# Patient Record
Sex: MALE | Race: WHITE | NOT HISPANIC OR LATINO | Employment: FULL TIME | URBAN - METROPOLITAN AREA
[De-identification: names, ages, dates, MRNs, and addresses within clinical notes are randomized per-mention and may not be internally consistent; named-entity substitution may affect disease eponyms.]

---

## 2017-01-03 ENCOUNTER — MEDICAL CORRESPONDENCE (OUTPATIENT)
Dept: HEALTH INFORMATION MANAGEMENT | Facility: CLINIC | Age: 27
End: 2017-01-03

## 2017-01-10 ENCOUNTER — MYC REFILL (OUTPATIENT)
Dept: FAMILY MEDICINE | Facility: CLINIC | Age: 27
End: 2017-01-10

## 2017-01-10 DIAGNOSIS — G44.219 EPISODIC TENSION-TYPE HEADACHE, NOT INTRACTABLE: ICD-10-CM

## 2017-01-10 DIAGNOSIS — F98.8 ADD (ATTENTION DEFICIT DISORDER): ICD-10-CM

## 2017-01-10 RX ORDER — BUTALBITAL, ACETAMINOPHEN AND CAFFEINE 50; 325; 40 MG/1; MG/1; MG/1
1 TABLET ORAL EVERY 6 HOURS PRN
Qty: 25 TABLET | Refills: 0 | Status: SHIPPED | OUTPATIENT
Start: 2017-01-10 | End: 2017-03-15

## 2017-01-10 RX ORDER — DEXTROAMPHETAMINE SACCHARATE, AMPHETAMINE ASPARTATE MONOHYDRATE, DEXTROAMPHETAMINE SULFATE AND AMPHETAMINE SULFATE 7.5; 7.5; 7.5; 7.5 MG/1; MG/1; MG/1; MG/1
CAPSULE, EXTENDED RELEASE ORAL
Qty: 60 CAPSULE | Refills: 0 | Status: CANCELLED | OUTPATIENT
Start: 2017-01-10

## 2017-01-10 NOTE — TELEPHONE ENCOUNTER
Message from [employer]:  Original authorizing provider: MD Tejas Baldwin III would like a refill of the following medications:  amphetamine-dextroamphetamine (ADDERALL XR) 30 MG per capsule [Karan Waters MD]  butalbital-acetaminophen-caffeine (FIORICET/ESGIC) -40 MG per tablet [Karan Waters MD]    Preferred pharmacy: [id number] - [place], [place] - [address]    Comment:

## 2017-01-10 NOTE — TELEPHONE ENCOUNTER
Controlled Substance Refill Request for Fioricet, Adderall  Problem List Complete:  No     PROVIDER TO CONSIDER COMPLETION OF PROBLEM LIST AND OVERVIEW/CONTROLLED SUBSTANCE AGREEMENT    Last Written Prescription Date:  12/23/16, Adderall (12/24/16  #60)  Last Fill Quantity: 25,   # refills: 0    Last Office Visit with [facility] primary care provider: 10/20/16    Future Office visit: none     checked in past 6 months?  Yes 11/30/16    Tangela Del Rio RN

## 2017-01-11 ENCOUNTER — TELEPHONE (OUTPATIENT)
Dept: FAMILY MEDICINE | Facility: CLINIC | Age: 27
End: 2017-01-11

## 2017-01-12 ENCOUNTER — MYC MEDICAL ADVICE (OUTPATIENT)
Dept: FAMILY MEDICINE | Facility: CLINIC | Age: 27
End: 2017-01-12

## 2017-01-12 DIAGNOSIS — F98.8 ADD (ATTENTION DEFICIT DISORDER): Primary | ICD-10-CM

## 2017-01-12 RX ORDER — DEXTROAMPHETAMINE SACCHARATE, AMPHETAMINE ASPARTATE MONOHYDRATE, DEXTROAMPHETAMINE SULFATE AND AMPHETAMINE SULFATE 7.5; 7.5; 7.5; 7.5 MG/1; MG/1; MG/1; MG/1
CAPSULE, EXTENDED RELEASE ORAL
Qty: 60 CAPSULE | Refills: 0 | Status: SHIPPED | OUTPATIENT
Start: 2017-01-12 | End: 2017-03-15

## 2017-01-13 RX ORDER — DEXTROAMPHETAMINE SACCHARATE, AMPHETAMINE ASPARTATE, DEXTROAMPHETAMINE SULFATE AND AMPHETAMINE SULFATE 7.5; 7.5; 7.5; 7.5 MG/1; MG/1; MG/1; MG/1
30 TABLET ORAL 2 TIMES DAILY
Qty: 60 TABLET | Refills: 0 | Status: SHIPPED | OUTPATIENT
Start: 2017-01-21 | End: 2017-02-23

## 2017-01-13 NOTE — TELEPHONE ENCOUNTER
Ok refill but NOT due until 1/22/17 since paying out of pocket anyway. Can pick-up whenever. [place].

## 2017-03-15 ENCOUNTER — MYC REFILL (OUTPATIENT)
Dept: FAMILY MEDICINE | Facility: CLINIC | Age: 27
End: 2017-03-15

## 2017-03-15 DIAGNOSIS — M54.9 MID BACK PAIN: ICD-10-CM

## 2017-03-15 DIAGNOSIS — G89.29 CHRONIC BILATERAL LOW BACK PAIN WITH RIGHT-SIDED SCIATICA: ICD-10-CM

## 2017-03-15 DIAGNOSIS — G44.219 EPISODIC TENSION-TYPE HEADACHE, NOT INTRACTABLE: ICD-10-CM

## 2017-03-15 DIAGNOSIS — M54.41 CHRONIC BILATERAL LOW BACK PAIN WITH RIGHT-SIDED SCIATICA: ICD-10-CM

## 2017-03-15 DIAGNOSIS — F32.2 MAJOR DEPRESSIVE DISORDER, SINGLE EPISODE, SEVERE WITHOUT PSYCHOTIC FEATURES (H): ICD-10-CM

## 2017-03-15 DIAGNOSIS — F98.8 ADD (ATTENTION DEFICIT DISORDER): ICD-10-CM

## 2017-03-15 RX ORDER — NABUMETONE 500 MG/1
1000 TABLET, FILM COATED ORAL 2 TIMES DAILY PRN
Qty: 50 TABLET | Refills: 0 | Status: CANCELLED | OUTPATIENT
Start: 2017-03-15

## 2017-03-15 NOTE — TELEPHONE ENCOUNTER
Message from Marilu:  Original authorizing provider: MD Ousmane Baldwin III would like a refill of the following medications:  nabumetone (RELAFEN) 500 MG tablet [Karan Waters MD]  sertraline (ZOLOFT) 100 MG tablet [Karan Waters MD]  gabapentin (NEURONTIN) 300 MG capsule [Karan Waters MD]  butalbital-acetaminophen-caffeine (FIORICET/ESGIC) -40 MG per tablet [Karan Waters MD]  amphetamine-dextroamphetamine (ADDERALL XR) 30 MG per 24 hr capsule [Karan Waters MD]    Preferred pharmacy: [id number] - [place], [place] - [address] [address]    Comment:  I just sent all once so i can do one  . I have a new insurance now and they will cover my extended 30mg adderall and the extended release oxycodone . Now that i have some good changes in life i would like to try it as it will be affordable even if its easier to do a trial dose I sent all medicaTion at once to make one trip this month, I need to save money to help cover costs that go along with my sons upcoming surgery in april . Thanks again for everything & enjoy this beautiful weather!!

## 2017-03-16 RX ORDER — SERTRALINE HYDROCHLORIDE 100 MG/1
TABLET, FILM COATED ORAL
Qty: 135 TABLET | Refills: 0 | Status: SHIPPED | OUTPATIENT
Start: 2017-03-16 | End: 2017-06-02

## 2017-03-16 RX ORDER — DEXTROAMPHETAMINE SACCHARATE, AMPHETAMINE ASPARTATE MONOHYDRATE, DEXTROAMPHETAMINE SULFATE AND AMPHETAMINE SULFATE 7.5; 7.5; 7.5; 7.5 MG/1; MG/1; MG/1; MG/1
CAPSULE, EXTENDED RELEASE ORAL
Qty: 60 CAPSULE | Refills: 0 | Status: SHIPPED | OUTPATIENT
Start: 2017-03-24 | End: 2017-03-16

## 2017-03-16 RX ORDER — GABAPENTIN 300 MG/1
300 CAPSULE ORAL
Qty: 30 CAPSULE | Refills: 1 | Status: SHIPPED | OUTPATIENT
Start: 2017-03-16 | End: 2017-04-14

## 2017-03-16 RX ORDER — BUTALBITAL, ACETAMINOPHEN AND CAFFEINE 50; 325; 40 MG/1; MG/1; MG/1
1 TABLET ORAL EVERY 6 HOURS PRN
Qty: 25 TABLET | Refills: 0 | Status: SHIPPED | OUTPATIENT
Start: 2017-03-16 | End: 2017-04-14

## 2017-03-16 NOTE — TELEPHONE ENCOUNTER
Ok refill but adderall not due until next week. Printed. relafen can interact with zoloft. Now with insurance let's get a follow-up md appointment to redue meds/etc.

## 2017-03-17 NOTE — TELEPHONE ENCOUNTER
Patient needs to wait prescription oxycontin because has 2-3 weeks left of oxycodone. Karan Waters MD

## 2017-03-28 ENCOUNTER — TELEPHONE (OUTPATIENT)
Dept: FAMILY MEDICINE | Facility: CLINIC | Age: 27
End: 2017-03-28

## 2017-03-28 NOTE — TELEPHONE ENCOUNTER
[facility] yRan Burch, calling to get medical information on this patient for the prior auth.  Please call them back at [phone].

## 2017-03-28 NOTE — TELEPHONE ENCOUNTER
Oxycontin 10mg - prior auth is required  [facility]  [phone]  ID [id number]    Also, qty written for #30  (twice daily sig) was it intended for 15 days only??  We would need a new rx if this should be corrected    Lanette F/Tech  [facility]

## 2017-03-30 NOTE — TELEPHONE ENCOUNTER
PA denied for Oxycontin ER    Formulary alternatives have not been tried. You must try similar drugs that are on your health plan formulary before we can approve drug coverage.  Patient needs to have tried and failed fetanyl transdermal and methadone.    Deirdre Munoz MA/TC

## 2017-03-31 ENCOUNTER — TELEPHONE (OUTPATIENT)
Dept: FAMILY MEDICINE | Facility: CLINIC | Age: 27
End: 2017-03-31

## 2017-03-31 DIAGNOSIS — M54.9 MID BACK PAIN: ICD-10-CM

## 2017-03-31 NOTE — TELEPHONE ENCOUNTER
Pt call Ins and denied piror auth., they are saying he should try methadone or fentenale patches.  Ins is dropping after today. Pt would like to to back to Oxycodon. Pt has appointment on 4/9/17 but is out of pain med now.  Pt left the Rx for Oxycotton at the [facility].    Thank You

## 2017-04-03 RX ORDER — OXYCODONE HYDROCHLORIDE 5 MG/1
5 TABLET ORAL 2 TIMES DAILY PRN
Qty: 60 TABLET | Refills: 0 | Status: SHIPPED | OUTPATIENT
Start: 2017-04-04 | End: 2017-05-11

## 2017-04-03 NOTE — TELEPHONE ENCOUNTER
I don't do fentynal patches or methadone. Can see pain specialist or another provider. All I'm comfortable is oxycondone as before. Prescription done for pick-up tomorrow if desires.

## 2017-04-03 NOTE — TELEPHONE ENCOUNTER
Called and informed patient of Dr Waters's message. RX brought to the  for .Deirdre Munoz MA/RAHEEL

## 2017-04-05 DIAGNOSIS — M54.9 MID BACK PAIN: ICD-10-CM

## 2017-04-10 ENCOUNTER — MYC MEDICAL ADVICE (OUTPATIENT)
Dept: FAMILY MEDICINE | Facility: CLINIC | Age: 27
End: 2017-04-10

## 2017-04-10 ENCOUNTER — OFFICE VISIT (OUTPATIENT)
Dept: FAMILY MEDICINE | Facility: CLINIC | Age: 27
End: 2017-04-10
Payer: COMMERCIAL

## 2017-04-10 VITALS
OXYGEN SATURATION: 97 % | WEIGHT: 216 LBS | TEMPERATURE: 98.5 F | SYSTOLIC BLOOD PRESSURE: 120 MMHG | DIASTOLIC BLOOD PRESSURE: 80 MMHG | BODY MASS INDEX: 28.5 KG/M2 | HEART RATE: 118 BPM

## 2017-04-10 DIAGNOSIS — F33.0 MAJOR DEPRESSIVE DISORDER, RECURRENT EPISODE, MILD (H): Primary | ICD-10-CM

## 2017-04-10 DIAGNOSIS — M54.9 MID BACK PAIN: ICD-10-CM

## 2017-04-10 DIAGNOSIS — F98.8 ADD (ATTENTION DEFICIT DISORDER): ICD-10-CM

## 2017-04-10 PROCEDURE — 99214 OFFICE O/P EST MOD 30 MIN: CPT | Performed by: FAMILY MEDICINE

## 2017-04-10 RX ORDER — DEXTROAMPHETAMINE SACCHARATE, AMPHETAMINE ASPARTATE, DEXTROAMPHETAMINE SULFATE AND AMPHETAMINE SULFATE 7.5; 7.5; 7.5; 7.5 MG/1; MG/1; MG/1; MG/1
30 TABLET ORAL 2 TIMES DAILY
Qty: 60 TABLET | Refills: 0 | Status: SHIPPED | OUTPATIENT
Start: 2017-04-22 | End: 2017-05-10

## 2017-04-10 ASSESSMENT — ANXIETY QUESTIONNAIRES
6. BECOMING EASILY ANNOYED OR IRRITABLE: NOT AT ALL
7. FEELING AFRAID AS IF SOMETHING AWFUL MIGHT HAPPEN: NOT AT ALL
2. NOT BEING ABLE TO STOP OR CONTROL WORRYING: SEVERAL DAYS
3. WORRYING TOO MUCH ABOUT DIFFERENT THINGS: SEVERAL DAYS
1. FEELING NERVOUS, ANXIOUS, OR ON EDGE: SEVERAL DAYS
GAD7 TOTAL SCORE: 4
5. BEING SO RESTLESS THAT IT IS HARD TO SIT STILL: NOT AT ALL

## 2017-04-10 ASSESSMENT — PATIENT HEALTH QUESTIONNAIRE - PHQ9: 5. POOR APPETITE OR OVEREATING: SEVERAL DAYS

## 2017-04-10 NOTE — PROGRESS NOTES
SUBJECTIVE:  Tejas Villalba III, a 26 year old male scheduled an appointment to discuss the following issues:  Follow-up adhd, anxiety/depression and chronic back pain.   At parents currently. 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 Job offers pending.   Girlfriend not working. 2.4 yo boy doing ok.   zoloft working ok. Walking. Caffeine 1-2 pops/day. No ALCOHOL.  adderall - everyday ok too spacy/lack of motivation. No chest pain or shortness of breath. Mid back/low back pain. Occasionally in buttock/legs. No weakness. No bladder changes. No SUICIAL IDEATION OR HOMOCIDAL IDEATION OR BERNADETTE.  ALLERGIC RHINITIS - benadryl sleepiness.   [place] - had [place] [facility] - unclear insurance provider.   oxycontin too expensive and ms contin not helpful.  Might try new pain specialist.   Medical, social, surgical, and family histories reviewed.    ROS:    OBJECTIVE:  /80  Pulse 118  Temp 98.5  F (36.9  C) (Oral)  Wt 216 lb (98 kg)  SpO2 97%  BMI 28.5 kg/m2  EXAM:  GENERAL APPEARANCE: healthy, alert and no distress  NECK: no adenopathy, no asymmetry, masses, or scars and thyroid normal to palpation  RESP: lungs clear to auscultation - no rales, rhonchi or wheezes  CV: regular rates and rhythm, normal S1 S2, no S3 or S4 and no murmur, click or rub -  ABDOMEN:  soft, nontender, no HSM or masses and bowel sounds normal  MS: extremities normal- no gross deformities noted, no evidence of inflammation in joints, FROM in all extremities.  MS: diffuse mid/low back tight paraspinous muscles.  SKIN: no suspicious lesions or rashes  NEURO: Normal strength and tone, sensory exam grossly normal, mentation intact and speech normal  PSYCH: mentation appears normal and affect normal/bright  PSYCH: mildly anxious and inattentive.      ASSESSMENT / PLAN:  (M54.9) Mid back pain  Comment: patient will like incraesed dosage  Plan: oxyCODONE HCl (OXECTA) 7.5 MG TABA, PAIN         MANAGEMENT EXTERNAL REFERRAL         Follow-up pain specialist - patient working on insurance. Continue avoid illicit drugs or ALCOHOL. Reveiwed risks and side effects of medication  Recheck in 3 months  Sooner if worse. Stretching/tyleno/ibuprofen and heat. Warmer weather should help. Call/email with questions/concerns     (F98.8) ADD (attention deficit disorder)  Comment: stable  Plan: amphetamine-dextroamphetamine (ADDERALL) 30 MG         per tablet        Recheck in 3 months  Sooner if worse.     (F33.0) Major depressive disorder, recurrent episode, mild (H)  (primary encounter diagnosis)  Comment: stable on zoloft. Stress with work/financial issues but good family support and son ok  Plan: continue zoloft/exercise. If SUICIAL IDEATION OR HOMOCIDAL IDEATION OR BERNADETTE TO ER. Call/email with questions/concerns.     Karan Waters MD

## 2017-04-10 NOTE — NURSING NOTE
"Chief Complaint   Patient presents with     A.D.H.D     Medication Therapy Management       Initial /80  Pulse 118  Temp 98.5  F (36.9  C) (Oral)  Wt 216 lb (98 kg)  SpO2 97%  BMI 28.5 kg/m2 Estimated body mass index is 28.5 kg/(m^2) as calculated from the following:    Height as of 3/12/16: 6' 1\" (1.854 m).    Weight as of this encounter: 216 lb (98 kg).  Medication Reconciliation: complete   Shama Stacy CMA    "

## 2017-04-10 NOTE — MR AVS SNAPSHOT
After Visit Summary   4/10/2017    Tejas Villalba III    MRN: [id number]           Patient Information     Date Of Birth          1990        Visit Information        Provider Department      4/10/2017 1:30 PM Karan Waters MD [facility]        Today's Diagnoses     Major depressive disorder, recurrent episode, mild (H)    -  1    Mid back pain        ADD (attention deficit disorder)           Follow-ups after your visit        Additional Services     PAIN MANAGEMENT EXTERNAL REFERRAL       Your provider has referred you to: N: [facility] [phone]   [email or web address]    Please be aware that coverage of these services is subject to the terms and limitations of your health insurance plan.  Call member services at your health plan with any benefit or coverage questions.      Please bring the following with you to your appointment:    (1) Any X-Rays, CTs or MRIs which have been performed.  Contact the facility where they were done to arrange for  prior to your scheduled appointment.  Any new CT, MRI or other procedures ordered by your specialist must be performed at a [facility] or coordinated by your clinic's referral office.    (2) List of current medications   (3) This referral request   (4) Any documents/labs given to you for this referral                  Who to contact     If you have questions or need follow up information about today's clinic visit or your schedule please contact [facility] directly at [phone].  Normal or non-critical lab and imaging results will be communicated to you by MyChart, letter or phone within 4 business days after the clinic has received the results. If you do not hear from us within 7 days, please contact the clinic through MyChart or phone. If you have a critical or abnormal lab result, we will notify you by phone as soon as possible.  Submit refill  requests through "[employer] or call your pharmacy and they will forward the refill request to us. Please allow 3 business days for your refill to be completed.          Additional Information About Your Visit        [employer] Information     "[employer] gives you secure access to your electronic health record. If you see a primary care provider, you can also send messages to your care team and make appointments. If you have questions, please call your primary care clinic.  If you do not have a primary care provider, please call [phone] and they will assist you.        Care EveryWhere ID     This is your Care EveryWhere ID. This could be used by other organizations to access your [facility] medical records  [id number]        Your Vitals Were     Pulse Temperature Pulse Oximetry BMI (Body Mass Index)          118 98.5  F (36.9  C) (Oral) 97% 28.5 kg/m2         Blood Pressure from Last 3 Encounters:   04/10/17 120/80   10/20/16 140/90   07/07/16 137/85    Weight from Last 3 Encounters:   04/10/17 216 lb (98 kg)   10/20/16 223 lb (101.2 kg)   07/07/16 226 lb (102.5 kg)              We Performed the Following     PAIN MANAGEMENT EXTERNAL REFERRAL          Today's Medication Changes          These changes are accurate as of: 4/10/17  2:01 PM.  If you have any questions, ask your nurse or doctor.               These medicines have changed or have updated prescriptions.        Dose/Directions    * oxyCODONE 5 MG IR tablet   Commonly known as:  ROXICODONE   This may have changed:  Another medication with the same name was added. Make sure you understand how and when to take each.   Used for:  Mid back pain   Changed by:  Karan Waters MD        Dose:  5 mg   Take 1 tablet (5 mg) by mouth 2 times daily as needed for moderate to severe pain   Quantity:  60 tablet   Refills:  0       * oxyCODONE HCl 7.5 MG Taba   Commonly known as:  OXECTA   This may have changed:  You were already taking a medication with the same name, and  this prescription was added. Make sure you understand how and when to take each.   Used for:  Mid back pain   Changed by:  Karan Waters MD        Dose:  1 each   Start taking on:  4/19/2017   Take 1 each by mouth 2 times daily   Quantity:  60 each   Refills:  0       * Notice:  This list has 2 medication(s) that are the same as other medications prescribed for you. Read the directions carefully, and ask your doctor or other care provider to review them with you.         Where to get your medicines      Some of these will need a paper prescription and others can be bought over the counter.  Ask your nurse if you have questions.     Bring a paper prescription for each of these medications     amphetamine-dextroamphetamine 30 MG per tablet    oxyCODONE HCl 7.5 MG Taba                Primary Care Provider Office Phone # Fax #    Karan Waters [phone]       [facility] [address] [address] [address]        Thank you!     Thank you for choosing [facility]  for your care. Our goal is always to provide you with excellent care. Hearing back from our patients is one way we can continue to improve our services. Please take a few minutes to complete the written survey that you may receive in the mail after your visit with us. Thank you!             Your Updated Medication List - Protect others around you: Learn how to safely use, store and throw away your medicines at www.disposemymeds.org.          This list is accurate as of: 4/10/17  2:01 PM.  Always use your most recent med list.                   Brand Name Dispense Instructions for use    amphetamine-dextroamphetamine 30 MG per tablet   Start taking on:  4/22/2017    ADDERALL    60 tablet    Take 1 tablet (30 mg) by mouth 2 times daily       baclofen 10 MG tablet    LIORESAL    60 tablet    Take 1 tablet (10 mg) by mouth 2 times daily as needed for muscle spasms       BENADRYL PO      Take 25 mg by mouth As  needed at bedtime       butalbital-acetaminophen-caffeine -40 MG per tablet    FIORICET/ESGIC    25 tablet    Take 1 tablet by mouth every 6 hours as needed       gabapentin 300 MG capsule    NEURONTIN    30 capsule    Take 1 capsule (300 mg) by mouth nightly as needed Sleep/back pain       IBUPROFEN PO      Take 600 mg by mouth 2 times daily       * oxyCODONE 5 MG IR tablet    ROXICODONE    60 tablet    Take 1 tablet (5 mg) by mouth 2 times daily as needed for moderate to severe pain       * oxyCODONE HCl 7.5 MG Taba   Start taking on:  4/19/2017    OXECTA    60 each    Take 1 each by mouth 2 times daily       sertraline 100 MG tablet    ZOLOFT    135 tablet    1.5 tabs =150mg For depression/anxiety Pharmacy ok to hold prescription until due       * Notice:  This list has 2 medication(s) that are the same as other medications prescribed for you. Read the directions carefully, and ask your doctor or other care provider to review them with you.

## 2017-04-11 ASSESSMENT — ANXIETY QUESTIONNAIRES: GAD7 TOTAL SCORE: 4

## 2017-04-11 ASSESSMENT — PATIENT HEALTH QUESTIONNAIRE - PHQ9: SUM OF ALL RESPONSES TO PHQ QUESTIONS 1-9: 3

## 2017-04-14 ENCOUNTER — MYC REFILL (OUTPATIENT)
Dept: FAMILY MEDICINE | Facility: CLINIC | Age: 27
End: 2017-04-14

## 2017-04-14 DIAGNOSIS — M54.41 CHRONIC BILATERAL LOW BACK PAIN WITH RIGHT-SIDED SCIATICA: ICD-10-CM

## 2017-04-14 DIAGNOSIS — G44.219 EPISODIC TENSION-TYPE HEADACHE, NOT INTRACTABLE: ICD-10-CM

## 2017-04-14 DIAGNOSIS — G89.29 CHRONIC BILATERAL LOW BACK PAIN WITH RIGHT-SIDED SCIATICA: ICD-10-CM

## 2017-04-14 RX ORDER — GABAPENTIN 300 MG/1
300 CAPSULE ORAL
Qty: 90 CAPSULE | Refills: 1 | Status: SHIPPED | OUTPATIENT
Start: 2017-04-14 | End: 2017-06-02

## 2017-04-14 RX ORDER — BUTALBITAL, ACETAMINOPHEN AND CAFFEINE 50; 325; 40 MG/1; MG/1; MG/1
1 TABLET ORAL EVERY 6 HOURS PRN
Qty: 25 TABLET | Refills: 0 | Status: SHIPPED | OUTPATIENT
Start: 2017-04-14 | End: 2017-05-10

## 2017-04-14 NOTE — TELEPHONE ENCOUNTER
Controlled Substance Refill Request for Fioricet  Problem List Complete:  No     PROVIDER TO CONSIDER COMPLETION OF PROBLEM LIST AND OVERVIEW/CONTROLLED SUBSTANCE AGREEMENT    Last Written Prescription Date:  3-16-17  Last Fill Quantity: 25,   # refills: 0    [place] [facility] [place]: 4-10-17    Future Office visit:     Controlled substance agreement on file: No.     Processing:  Fax Rx to [facility] pharmacy   checked in past 6 months?  Yes 11-30-16   Shama Roberts RN

## 2017-04-14 NOTE — TELEPHONE ENCOUNTER
Message from Shanit:  Original authorizing provider: MD Ousmane Baldwin III would like a refill of the following medications:  gabapentin (NEURONTIN) 300 MG capsule [Karan Waters MD]  butalbital-acetaminophen-caffeine (FIORICET/ESGIC) -40 MG per tablet [Karan Waters MD]    Preferred pharmacy: [id number] - [place], [place] - [address] [address]    Comment:

## 2017-06-30 PROBLEM — F90.8 ATTENTION DEFICIT HYPERACTIVITY DISORDER (ADHD), OTHER TYPE: Status: ACTIVE | Noted: 2017-06-30

## 2017-08-29 ENCOUNTER — TELEPHONE (OUTPATIENT)
Dept: FAMILY MEDICINE | Facility: CLINIC | Age: 27
End: 2017-08-29

## 2017-09-26 ENCOUNTER — OFFICE VISIT (OUTPATIENT)
Dept: FAMILY MEDICINE | Facility: CLINIC | Age: 27
End: 2017-09-26
Payer: COMMERCIAL

## 2017-09-26 VITALS
OXYGEN SATURATION: 100 % | BODY MASS INDEX: 28.23 KG/M2 | DIASTOLIC BLOOD PRESSURE: 82 MMHG | HEART RATE: 95 BPM | WEIGHT: 214 LBS | TEMPERATURE: 98.7 F | SYSTOLIC BLOOD PRESSURE: 122 MMHG

## 2017-09-26 DIAGNOSIS — F90.8 ATTENTION DEFICIT HYPERACTIVITY DISORDER (ADHD), OTHER TYPE: ICD-10-CM

## 2017-09-26 DIAGNOSIS — F32.2 MAJOR DEPRESSIVE DISORDER, SINGLE EPISODE, SEVERE WITHOUT PSYCHOTIC FEATURES (H): ICD-10-CM

## 2017-09-26 DIAGNOSIS — G44.219 EPISODIC TENSION-TYPE HEADACHE, NOT INTRACTABLE: ICD-10-CM

## 2017-09-26 DIAGNOSIS — M54.9 MID BACK PAIN: ICD-10-CM

## 2017-09-26 PROCEDURE — 99214 OFFICE O/P EST MOD 30 MIN: CPT | Performed by: FAMILY MEDICINE

## 2017-09-26 RX ORDER — OXYCODONE HYDROCHLORIDE 10 MG/1
10 TABLET ORAL 2 TIMES DAILY PRN
Qty: 60 TABLET | Refills: 0 | Status: SHIPPED | OUTPATIENT
Start: 2017-10-04 | End: 2017-09-26

## 2017-09-26 RX ORDER — OXYCODONE HYDROCHLORIDE 10 MG/1
10 TABLET ORAL 2 TIMES DAILY PRN
Qty: 60 TABLET | Refills: 0 | Status: SHIPPED | OUTPATIENT
Start: 2017-11-03 | End: 2017-09-26

## 2017-09-26 RX ORDER — BUTALBITAL, ACETAMINOPHEN AND CAFFEINE 50; 325; 40 MG/1; MG/1; MG/1
TABLET ORAL
Qty: 25 TABLET | Refills: 0 | Status: SHIPPED | OUTPATIENT
Start: 2017-09-26 | End: 2017-10-24

## 2017-09-26 RX ORDER — DEXTROAMPHETAMINE SACCHARATE, AMPHETAMINE ASPARTATE, DEXTROAMPHETAMINE SULFATE AND AMPHETAMINE SULFATE 7.5; 7.5; 7.5; 7.5 MG/1; MG/1; MG/1; MG/1
30 TABLET ORAL 2 TIMES DAILY
Qty: 60 TABLET | Refills: 0 | Status: SHIPPED | OUTPATIENT
Start: 2017-12-02 | End: 2017-12-18

## 2017-09-26 RX ORDER — OXYCODONE HYDROCHLORIDE 10 MG/1
10 TABLET ORAL 2 TIMES DAILY PRN
Qty: 60 TABLET | Refills: 0 | Status: SHIPPED | OUTPATIENT
Start: 2017-12-02 | End: 2017-12-18

## 2017-09-26 RX ORDER — DEXTROAMPHETAMINE SACCHARATE, AMPHETAMINE ASPARTATE, DEXTROAMPHETAMINE SULFATE AND AMPHETAMINE SULFATE 7.5; 7.5; 7.5; 7.5 MG/1; MG/1; MG/1; MG/1
30 TABLET ORAL 2 TIMES DAILY
Qty: 60 TABLET | Refills: 0 | Status: SHIPPED | OUTPATIENT
Start: 2017-11-03 | End: 2017-09-26

## 2017-09-26 RX ORDER — DEXTROAMPHETAMINE SACCHARATE, AMPHETAMINE ASPARTATE, DEXTROAMPHETAMINE SULFATE AND AMPHETAMINE SULFATE 7.5; 7.5; 7.5; 7.5 MG/1; MG/1; MG/1; MG/1
30 TABLET ORAL 2 TIMES DAILY
Qty: 60 TABLET | Refills: 0 | Status: SHIPPED | OUTPATIENT
Start: 2017-10-04 | End: 2017-09-26

## 2017-09-26 RX ORDER — SERTRALINE HYDROCHLORIDE 100 MG/1
TABLET, FILM COATED ORAL
Qty: 135 TABLET | Refills: 1 | Status: SHIPPED | OUTPATIENT
Start: 2017-09-26 | End: 2017-12-18

## 2017-09-26 ASSESSMENT — ANXIETY QUESTIONNAIRES
6. BECOMING EASILY ANNOYED OR IRRITABLE: NOT AT ALL
1. FEELING NERVOUS, ANXIOUS, OR ON EDGE: SEVERAL DAYS
2. NOT BEING ABLE TO STOP OR CONTROL WORRYING: SEVERAL DAYS
7. FEELING AFRAID AS IF SOMETHING AWFUL MIGHT HAPPEN: NOT AT ALL
3. WORRYING TOO MUCH ABOUT DIFFERENT THINGS: SEVERAL DAYS
GAD7 TOTAL SCORE: 4
5. BEING SO RESTLESS THAT IT IS HARD TO SIT STILL: NOT AT ALL

## 2017-09-26 ASSESSMENT — PATIENT HEALTH QUESTIONNAIRE - PHQ9
SUM OF ALL RESPONSES TO PHQ QUESTIONS 1-9: 1
5. POOR APPETITE OR OVEREATING: SEVERAL DAYS

## 2017-09-26 NOTE — TELEPHONE ENCOUNTER
Controlled Substance Refill Request for fioricet  Problem List Complete:  No     PROVIDER TO CONSIDER COMPLETION OF PROBLEM LIST AND OVERVIEW/CONTROLLED SUBSTANCE AGREEMENT    Last Written Prescription Date:  8/21/17  Last Fill Quantity: 25,   # refills: 0    Last Office Visit with [facility] primary care provider: TODAY    Future Office visit: none    Controlled substance agreement on file: No.     checked in past 6 months?  Yes 8/28/17    Tangela Del Rio RN

## 2017-09-26 NOTE — NURSING NOTE
"Chief Complaint   Patient presents with     A.D.H.D     Depression     Anxiety       Initial /82 (BP Location: Left arm, Patient Position: Sitting, Cuff Size: Adult Large)  Pulse 95  Temp 98.7  F (37.1  C) (Oral)  Wt 214 lb (97.1 kg)  SpO2 100%  BMI 28.23 kg/m2 Estimated body mass index is 28.23 kg/(m^2) as calculated from the following:    Height as of 3/12/16: 6' 1\" (1.854 m).    Weight as of this encounter: 214 lb (97.1 kg).  Medication Reconciliation: complete  Shama Stacy CMA    "

## 2017-09-26 NOTE — PROGRESS NOTES
SUBJECTIVE:  Tejas Villalba III, a 26 year old male scheduled an appointment to discuss the following issues:  Follow-up adhd, anxiety and chronic pain issues.   Family doing ok. 3 yo boy doing well. 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 zoloft working well 1.5 pills.  No nausea, vomiting or diarrhea or bloody stools. No urine changes or hematuria.  adderall daily - no focus without taking. No chest pain or shortness of breath.  Sleep hit/miss. Baclofen not as effective. No SUICIAL IDEATION OR HOMOCIDAL IDEATION OR BERNADETTE.   No zanaflex in past. Migraines about x2/week - related to neck/back. Insurance - poor.   Chiropractor not interested. P.t. In past. Doing stretching.   Medical, social, surgical, and family histories reviewed.    ROS:  All other ROS     OBJECTIVE:  /82 (BP Location: Left arm, Patient Position: Sitting, Cuff Size: Adult Large)  Pulse 95  Temp 98.7  F (37.1  C) (Oral)  Wt 214 lb (97.1 kg)  SpO2 100%  BMI 28.23 kg/m2  EXAM:  GENERAL APPEARANCE: healthy, alert and no distress  NECK: no adenopathy, no asymmetry, masses, or scars and thyroid normal to palpation  RESP: lungs clear to auscultation - no rales, rhonchi or wheezes  CV: regular rates and rhythm, normal S1 S2, no S3 or S4 and no murmur, click or rub -  ABDOMEN:  soft, nontender, no HSM or masses and bowel sounds normal  MS: extremities normal- no gross deformities noted, no evidence of inflammation in joints, FROM in all extremities.  MS: diffuse lower lumbar tenderness  NEURO: Normal strength and tone, sensory exam grossly normal, mentation intact and speech normal  PSYCH: mentation appears normal and affect normal/bright    ASSESSMENT / PLAN:  (F90.8) Attention deficit hyperactivity disorder (ADHD), other type  Comment: stable  Plan: amphetamine-dextroamphetamine (ADDERALL) 30 MG         per tablet, DISCONTINUED:         amphetamine-dextroamphetamine (ADDERALL) 30 MG         per tablet, DISCONTINUED:          amphetamine-dextroamphetamine (ADDERALL) 30 MG         per tablet        Recheck in 6 months  Sooner if worse    (M54.9) Mid back pain  Comment: stable  Plan: oxyCODONE (ROXICODONE) 10 MG IR tablet,         DISCONTINUED: oxyCODONE (ROXICODONE) 10 MG IR         tablet, DISCONTINUED: oxyCODONE (ROXICODONE) 10        MG IR tablet        P.t. Exercise. Consider chiropractor or back specialist again. Reveiwed risks and side effects of medication  Recheck in 6 months  Sooner if worse. Avoid with ALCOHOL or benzos.     (F32.2) Major depressive disorder, single episode, severe without psychotic features (H)  Comment: stable  Plan: sertraline (ZOLOFT) 100 MG tablet         If SUICIAL IDEATION OR HOMOCIDAL IDEATION OR BERNADETTE TO ER. Recheck in 6 months  Sooner if worse. Continue exercise. Family doing well. Call/email with questions/concerns    Karan Waters MD

## 2017-09-26 NOTE — MR AVS SNAPSHOT
After Visit Summary   9/26/2017    Tejas Villalba III    MRN: [id number]           Patient Information     Date Of Birth          1990        Visit Information        Provider Department      9/26/2017 2:50 PM Karan Waters MD [facility]        Today's Diagnoses     Attention deficit hyperactivity disorder (ADHD), other type        Mid back pain        Major depressive disorder, single episode, severe without psychotic features (H)           Follow-ups after your visit        Who to contact     If you have questions or need follow up information about today's clinic visit or your schedule please contact [facility] directly at [phone].  Normal or non-critical lab and imaging results will be communicated to you by Cherryhart, letter or phone within 4 business days after the clinic has received the results. If you do not hear from us within 7 days, please contact the clinic through Cherryhart or phone. If you have a critical or abnormal lab result, we will notify you by phone as soon as possible.  Submit refill requests through Feidee or call your pharmacy and they will forward the refill request to us. Please allow 3 business days for your refill to be completed.          Additional Information About Your Visit        MyChart Information     Feidee gives you secure access to your electronic health record. If you see a primary care provider, you can also send messages to your care team and make appointments. If you have questions, please call your primary care clinic.  If you do not have a primary care provider, please call [phone] and they will assist you.        Care EveryWhere ID     This is your Care EveryWhere ID. This could be used by other organizations to access your [place] medical records  [id number]        Your Vitals Were     Pulse Temperature Pulse Oximetry BMI (Body Mass Index)          95 98.7  F (37.1  C) (Oral) 100% 28.23 kg/m2          Blood Pressure from Last 3 Encounters:   09/26/17 122/82   04/10/17 120/80   10/20/16 140/90    Weight from Last 3 Encounters:   09/26/17 214 lb (97.1 kg)   04/10/17 216 lb (98 kg)   10/20/16 223 lb (101.2 kg)              We Performed the Following     DEPRESSION ACTION PLAN (DAP)          Today's Medication Changes          These changes are accurate as of: 9/26/17  3:33 PM.  If you have any questions, ask your nurse or doctor.               Start taking these medicines.        Dose/Directions    amphetamine-dextroamphetamine 30 MG per tablet   Commonly known as:  ADDERALL   Used for:  Attention deficit hyperactivity disorder (ADHD), other type   Started by:  Karan Waters MD        Dose:  30 mg   Start taking on:  12/2/2017   Take 1 tablet (30 mg) by mouth 2 times daily   Quantity:  60 tablet   Refills:  0       oxyCODONE 10 MG IR tablet   Commonly known as:  ROXICODONE   Used for:  Mid back pain   Started by:  Karan Waters MD        Dose:  10 mg   Start taking on:  12/2/2017   Take 1 tablet (10 mg) by mouth 2 times daily as needed for moderate to severe pain Max#60/month   Quantity:  60 tablet   Refills:  0            Where to get your medicines      These medications were sent to [facility] [address] [place] [address] AT [facility] & [address] [address]     Phone:  [phone]     sertraline 100 MG tablet         Some of these will need a paper prescription and others can be bought over the counter.  Ask your nurse if you have questions.     Bring a paper prescription for each of these medications     amphetamine-dextroamphetamine 30 MG per tablet    oxyCODONE 10 MG IR tablet                Primary Care Provider Office Phone # Fax #    Karan Waters [phone] [address] [facility] [address]        Equal Access to Services     WANDA SEGURA AH: rebeca Carmona, black  ang quevedoxiomara quinn ah. So [facility] [phone].    ATENCIÓN: Si grace renteria, tiene a cai disposición servicios gratuitos de asistencia lingüística. Mickey al [phone].    We comply with applicable federal civil rights laws and [place] laws. We do not discriminate on the basis of race, color, national origin, age, disability sex, sexual orientation or gender identity.            Thank you!     Thank you for choosing [facility]  for your care. Our goal is always to provide you with excellent care. Hearing back from our patients is one way we can continue to improve our services. Please take a few minutes to complete the written survey that you may receive in the mail after your visit with us. Thank you!             Your Updated Medication List - Protect others around you: Learn how to safely use, store and throw away your medicines at www.disposemymeds.org.          This list is accurate as of: 9/26/17  3:33 PM.  Always use your most recent med list.                   Brand Name Dispense Instructions for use Diagnosis    amphetamine-dextroamphetamine 30 MG per tablet   Start taking on:  12/2/2017    ADDERALL    60 tablet    Take 1 tablet (30 mg) by mouth 2 times daily    Attention deficit hyperactivity disorder (ADHD), other type       baclofen 10 MG tablet    LIORESAL    60 tablet    Take 1 tablet (10 mg) by mouth 2 times daily as needed for muscle spasms    Chronic bilateral low back pain with right-sided sciatica       BENADRYL PO      Take 25 mg by mouth As needed at bedtime        butalbital-acetaminophen-caffeine -40 MG per tablet    FIORICET/ESGIC    25 tablet    Take 1 tablet by mouth every 6 hours as needed    Episodic tension-type headache, not intractable       gabapentin 300 MG capsule    NEURONTIN    90 capsule    Take 1 capsule (300 mg) by mouth nightly as needed Sleep/back pain    Chronic bilateral low back pain with right-sided sciatica        IBUPROFEN PO      Take 600 mg by mouth 2 times daily As needed        oxyCODONE 10 MG IR tablet   Start taking on:  12/2/2017    ROXICODONE    60 tablet    Take 1 tablet (10 mg) by mouth 2 times daily as needed for moderate to severe pain Max#60/month    Mid back pain       sertraline 100 MG tablet    ZOLOFT    135 tablet    1.5 tabs =150mg For depression/anxiety Pharmacy ok to hold prescription until due    Major depressive disorder, single episode, severe without psychotic features (H)

## 2017-09-27 ASSESSMENT — ANXIETY QUESTIONNAIRES: GAD7 TOTAL SCORE: 4

## 2017-10-30 DIAGNOSIS — G44.219 EPISODIC TENSION-TYPE HEADACHE, NOT INTRACTABLE: ICD-10-CM

## 2017-10-31 NOTE — TELEPHONE ENCOUNTER
Controlled Substance Refill Request for fioricet  Problem List Complete:  No       PROVIDER TO CONSIDER COMPLETION OF PROBLEM LIST AND OVERVIEW/CONTROLLED SUBSTANCE AGREEMENT      Last Written Prescription Date:  10/24/17 (7 days ago)  Last Fill Quantity: 25,   # refills: 0      Last Office Visit with [facility] primary care provider: 9/26/17      Future Office visit: none      Controlled substance agreement on file: No.       checked in past 6 months?  Yes 8/28/17

## 2017-11-02 RX ORDER — BUTALBITAL, ACETAMINOPHEN AND CAFFEINE 50; 325; 40 MG/1; MG/1; MG/1
TABLET ORAL
Qty: 30 TABLET | Refills: 1 | Status: SHIPPED | OUTPATIENT
Start: 2017-11-02 | End: 2017-12-04

## 2017-12-04 ENCOUNTER — MYC MEDICAL ADVICE (OUTPATIENT)
Dept: FAMILY MEDICINE | Facility: CLINIC | Age: 27
End: 2017-12-04

## 2017-12-04 DIAGNOSIS — G89.29 CHRONIC BILATERAL LOW BACK PAIN WITH RIGHT-SIDED SCIATICA: Primary | ICD-10-CM

## 2017-12-04 DIAGNOSIS — G44.219 EPISODIC TENSION-TYPE HEADACHE, NOT INTRACTABLE: ICD-10-CM

## 2017-12-04 DIAGNOSIS — M54.41 CHRONIC BILATERAL LOW BACK PAIN WITH RIGHT-SIDED SCIATICA: Primary | ICD-10-CM

## 2017-12-05 RX ORDER — BUTALBITAL, ACETAMINOPHEN AND CAFFEINE 50; 325; 40 MG/1; MG/1; MG/1
TABLET ORAL
Qty: 30 TABLET | Refills: 1 | Status: SHIPPED | OUTPATIENT
Start: 2017-12-05 | End: 2018-01-19

## 2017-12-05 RX ORDER — CYCLOBENZAPRINE HCL 10 MG
5-10 TABLET ORAL
Qty: 30 TABLET | Refills: 1 | Status: SHIPPED | OUTPATIENT
Start: 2017-12-05 | End: 2018-01-19

## 2017-12-18 ENCOUNTER — MYC REFILL (OUTPATIENT)
Dept: FAMILY MEDICINE | Facility: CLINIC | Age: 27
End: 2017-12-18

## 2017-12-18 DIAGNOSIS — M54.41 CHRONIC BILATERAL LOW BACK PAIN WITH RIGHT-SIDED SCIATICA: ICD-10-CM

## 2017-12-18 DIAGNOSIS — F90.8 ATTENTION DEFICIT HYPERACTIVITY DISORDER (ADHD), OTHER TYPE: ICD-10-CM

## 2017-12-18 DIAGNOSIS — G89.29 CHRONIC BILATERAL LOW BACK PAIN WITH RIGHT-SIDED SCIATICA: ICD-10-CM

## 2017-12-18 DIAGNOSIS — F32.2 MAJOR DEPRESSIVE DISORDER, SINGLE EPISODE, SEVERE WITHOUT PSYCHOTIC FEATURES (H): ICD-10-CM

## 2017-12-18 DIAGNOSIS — M54.9 MID BACK PAIN: ICD-10-CM

## 2017-12-18 RX ORDER — GABAPENTIN 300 MG/1
300 CAPSULE ORAL
Qty: 90 CAPSULE | Refills: 1 | Status: SHIPPED | OUTPATIENT
Start: 2017-12-18 | End: 2018-01-19

## 2017-12-18 RX ORDER — OXYCODONE HYDROCHLORIDE 10 MG/1
10 TABLET ORAL 2 TIMES DAILY PRN
Qty: 60 TABLET | Refills: 0 | Status: CANCELLED | OUTPATIENT
Start: 2017-12-18

## 2017-12-18 RX ORDER — OXYCODONE HYDROCHLORIDE 10 MG/1
10 TABLET ORAL 2 TIMES DAILY PRN
Qty: 60 TABLET | Refills: 0 | Status: SHIPPED | OUTPATIENT
Start: 2018-01-02 | End: 2018-01-19

## 2017-12-18 RX ORDER — SERTRALINE HYDROCHLORIDE 100 MG/1
TABLET, FILM COATED ORAL
Qty: 135 TABLET | Refills: 1 | Status: SHIPPED | OUTPATIENT
Start: 2017-12-18 | End: 2018-01-19

## 2017-12-18 RX ORDER — DEXTROAMPHETAMINE SACCHARATE, AMPHETAMINE ASPARTATE, DEXTROAMPHETAMINE SULFATE AND AMPHETAMINE SULFATE 7.5; 7.5; 7.5; 7.5 MG/1; MG/1; MG/1; MG/1
30 TABLET ORAL 2 TIMES DAILY
Qty: 60 TABLET | Refills: 0 | Status: SHIPPED | OUTPATIENT
Start: 2018-01-02 | End: 2018-01-19

## 2017-12-18 NOTE — TELEPHONE ENCOUNTER
Controlled Substance Refill Request for Adderall  Problem List Complete:  No      PROVIDER TO CONSIDER COMPLETION OF PROBLEM LIST AND OVERVIEW/CONTROLLED SUBSTANCE AGREEMENT     Last Written Prescription Date:  12/2/17  Last Fill Quantity: 60,   # refills: 0     Last Office Visit with [facility] primary care provider: 9/26/17     Future Office visit:      Controlled substance agreement on file: No.         checked in past 6 months?  Yes 8/28/17              Controlled Substance Refill Request for Oxycodone  Problem List Complete:  No      PROVIDER TO CONSIDER COMPLETION OF PROBLEM LIST AND OVERVIEW/CONTROLLED SUBSTANCE AGREEMENT     Last Written Prescription Date:  12/2/17  Last Fill Quantity: 60,   # refills: 0     Last Office Visit with [facility] primary care provider: 9/26/17     Future Office visit:      Controlled substance agreement on file: No.         checked in past 6 months?  Yes 8/28/17       Shama Roberts RN

## 2017-12-18 NOTE — TELEPHONE ENCOUNTER
Message from Pablohart:  Original authorizing provider: MD Ousmane Baldwin III would like a refill of the following medications:  gabapentin (NEURONTIN) 300 MG capsule [Karan Waters MD]  amphetamine-dextroamphetamine (ADDERALL) 30 MG per tablet [Karan Waters MD]  oxyCODONE (ROXICODONE) 10 MG IR tablet [Karan Waters MD]  sertraline (ZOLOFT) 100 MG tablet [Karan Waters MD]    Preferred pharmacy: [facility] [address] [facility] [address] [facility] AT [facility]    Comment:  Could i  my paper copys this week please starting next week i will be doing holiday traveling till.jan 10th Thanks again, Ousmane Villalba

## 2018-01-19 ENCOUNTER — MYC REFILL (OUTPATIENT)
Dept: FAMILY MEDICINE | Facility: CLINIC | Age: 28
End: 2018-01-19

## 2018-01-19 DIAGNOSIS — F32.2 MAJOR DEPRESSIVE DISORDER, SINGLE EPISODE, SEVERE WITHOUT PSYCHOTIC FEATURES (H): ICD-10-CM

## 2018-01-19 DIAGNOSIS — M54.9 MID BACK PAIN: ICD-10-CM

## 2018-01-19 DIAGNOSIS — M54.41 CHRONIC BILATERAL LOW BACK PAIN WITH RIGHT-SIDED SCIATICA: ICD-10-CM

## 2018-01-19 DIAGNOSIS — G44.219 EPISODIC TENSION-TYPE HEADACHE, NOT INTRACTABLE: ICD-10-CM

## 2018-01-19 DIAGNOSIS — G89.29 CHRONIC BILATERAL LOW BACK PAIN WITH RIGHT-SIDED SCIATICA: ICD-10-CM

## 2018-01-19 DIAGNOSIS — F90.8 ATTENTION DEFICIT HYPERACTIVITY DISORDER (ADHD), OTHER TYPE: ICD-10-CM

## 2018-01-19 RX ORDER — OXYCODONE HYDROCHLORIDE 10 MG/1
10 TABLET ORAL 2 TIMES DAILY PRN
Qty: 60 TABLET | Refills: 0 | Status: SHIPPED | OUTPATIENT
Start: 2018-01-19 | End: 2018-02-16

## 2018-01-19 RX ORDER — GABAPENTIN 300 MG/1
300 CAPSULE ORAL
Qty: 90 CAPSULE | Refills: 1 | Status: SHIPPED | OUTPATIENT
Start: 2018-01-19 | End: 2018-02-16

## 2018-01-19 RX ORDER — BUTALBITAL, ACETAMINOPHEN AND CAFFEINE 50; 325; 40 MG/1; MG/1; MG/1
TABLET ORAL
Qty: 30 TABLET | Refills: 1 | Status: SHIPPED | OUTPATIENT
Start: 2018-01-19 | End: 2018-02-16

## 2018-01-19 RX ORDER — CYCLOBENZAPRINE HCL 10 MG
5-10 TABLET ORAL
Qty: 30 TABLET | Refills: 1 | Status: SHIPPED | OUTPATIENT
Start: 2018-01-19 | End: 2018-02-16

## 2018-01-19 RX ORDER — SERTRALINE HYDROCHLORIDE 100 MG/1
TABLET, FILM COATED ORAL
Qty: 135 TABLET | Refills: 1 | Status: SHIPPED | OUTPATIENT
Start: 2018-01-19 | End: 2018-02-16

## 2018-01-19 RX ORDER — DEXTROAMPHETAMINE SACCHARATE, AMPHETAMINE ASPARTATE, DEXTROAMPHETAMINE SULFATE AND AMPHETAMINE SULFATE 7.5; 7.5; 7.5; 7.5 MG/1; MG/1; MG/1; MG/1
30 TABLET ORAL 2 TIMES DAILY
Qty: 60 TABLET | Refills: 0 | Status: SHIPPED | OUTPATIENT
Start: 2018-01-19 | End: 2018-02-16

## 2018-01-19 NOTE — TELEPHONE ENCOUNTER
Ok refills. Please notify patient because of new guidelines with government/[employer] I will no longer be able to prescription oxycodone. Patient needs follow-up with pain specialist - please provide #'s. I will refill until can get into and it not will write to wean off this spring. Karan Waters MD

## 2018-01-19 NOTE — TELEPHONE ENCOUNTER
Controlled Substance Refill Request for Adderall  Problem List Complete:  No           Last Written Prescription Date:  1/2/18  Last Fill Quantity: 60,   # refills: 0      Last Office Visit with [facility] primary care provider: 9/26/17      Future Office visit:       Controlled substance agreement on file: No.          checked in past 6 months?  Yes 8/28/17      Controlled Substance Refill request for Fioricet  Problem list Complete:  No    Last Written prescription date:  12/5/17  Last Fill Quantity:  30  # Refills:  0          Controlled Substance Refill Request for Oxycodone  Problem List Complete:  No       Last Written Prescription Date:  1/2/18  Last Fill Quantity: 60,   # refills: 0      Last Office Visit with [facility] primary care provider: 9/26/17      Future Office visit:       Controlled substance agreement on file: No.          checked in past 6 months?  Yes 8/28/17        Shama Roberts RN

## 2018-01-19 NOTE — TELEPHONE ENCOUNTER
Message from Shanit:  Original authorizing provider: MD Ousmane Baldwin III would like a refill of the following medications:  butalbital-acetaminophen-caffeine (FIORICET/ESGIC) -40 MG per tablet [Karan Waters MD]  cyclobenzaprine (FLEXERIL) 10 MG tablet [Karan Waters MD]  gabapentin (NEURONTIN) 300 MG capsule [Karan Waters MD]  amphetamine-dextroamphetamine (ADDERALL) 30 MG per tablet [Karan Waters MD]  sertraline (ZOLOFT) 100 MG tablet [Karan Waters MD]  oxyCODONE IR (ROXICODONE) 10 MG tablet [Karan Waters MD]    Preferred pharmacy: [facility] [address] [place] [address] AT [facility]    Comment:  Would it be possible to  all the hard hard copies at the same time to avoid multiple trips? Thanks Ousmane

## 2018-03-15 ENCOUNTER — MYC REFILL (OUTPATIENT)
Dept: FAMILY MEDICINE | Facility: CLINIC | Age: 28
End: 2018-03-15

## 2018-03-15 DIAGNOSIS — G89.29 CHRONIC BILATERAL LOW BACK PAIN WITH RIGHT-SIDED SCIATICA: ICD-10-CM

## 2018-03-15 DIAGNOSIS — M54.9 MID BACK PAIN: ICD-10-CM

## 2018-03-15 DIAGNOSIS — F90.8 ATTENTION DEFICIT HYPERACTIVITY DISORDER (ADHD), OTHER TYPE: ICD-10-CM

## 2018-03-15 DIAGNOSIS — G44.219 EPISODIC TENSION-TYPE HEADACHE, NOT INTRACTABLE: ICD-10-CM

## 2018-03-15 DIAGNOSIS — M54.41 CHRONIC BILATERAL LOW BACK PAIN WITH RIGHT-SIDED SCIATICA: ICD-10-CM

## 2018-03-15 DIAGNOSIS — F32.2 MAJOR DEPRESSIVE DISORDER, SINGLE EPISODE, SEVERE WITHOUT PSYCHOTIC FEATURES (H): ICD-10-CM

## 2018-03-15 RX ORDER — SERTRALINE HYDROCHLORIDE 100 MG/1
TABLET, FILM COATED ORAL
Qty: 135 TABLET | Refills: 1 | Status: CANCELLED | OUTPATIENT
Start: 2018-03-15

## 2018-03-15 RX ORDER — BUTALBITAL, ACETAMINOPHEN AND CAFFEINE 50; 325; 40 MG/1; MG/1; MG/1
TABLET ORAL
Qty: 30 TABLET | Refills: 1 | Status: CANCELLED | OUTPATIENT
Start: 2018-03-15

## 2018-03-15 RX ORDER — OXYCODONE HYDROCHLORIDE 10 MG/1
10 TABLET ORAL DAILY PRN
Qty: 15 TABLET | Refills: 0 | Status: SHIPPED | OUTPATIENT
Start: 2018-03-15 | End: 2018-04-24

## 2018-03-15 RX ORDER — CYCLOBENZAPRINE HCL 10 MG
5-10 TABLET ORAL
Qty: 30 TABLET | Refills: 1 | Status: CANCELLED | OUTPATIENT
Start: 2018-03-15

## 2018-03-15 RX ORDER — DEXTROAMPHETAMINE SACCHARATE, AMPHETAMINE ASPARTATE, DEXTROAMPHETAMINE SULFATE AND AMPHETAMINE SULFATE 7.5; 7.5; 7.5; 7.5 MG/1; MG/1; MG/1; MG/1
30 TABLET ORAL 2 TIMES DAILY
Qty: 60 TABLET | Refills: 0 | Status: SHIPPED | OUTPATIENT
Start: 2018-03-15 | End: 2018-04-24

## 2018-03-15 RX ORDER — GABAPENTIN 300 MG/1
300 CAPSULE ORAL
Qty: 90 CAPSULE | Refills: 1 | Status: CANCELLED | OUTPATIENT
Start: 2018-03-15

## 2018-03-15 NOTE — TELEPHONE ENCOUNTER
Message from Shanit:  Original authorizing provider: MD Ousmane Baldwin III would like a refill of the following medications:  butalbital-acetaminophen-caffeine (FIORICET/ESGIC) -40 MG per tablet [Karan Waters MD]  cyclobenzaprine (FLEXERIL) 10 MG tablet [Karan Waters MD]  gabapentin (NEURONTIN) 300 MG capsule [Karan Waters MD]  amphetamine-dextroamphetamine (ADDERALL) 30 MG per tablet [Karan Waters MD]  sertraline (ZOLOFT) 100 MG tablet [Karan Waters MD]  oxyCODONE IR (ROXICODONE) 10 MG tablet [Karan Waters MD]    Preferred pharmacy: [facility] [address] [place] [address] AT [facility]    Comment:  Can i pick all up at the same time to avoid multiple trips? Thanks Ousmane ACEVES

## 2018-03-15 NOTE — TELEPHONE ENCOUNTER
Controlled Substance Refill Request for adderall     Problem List Complete:  No      PROVIDER TO CONSIDER COMPLETION OF PROBLEM LIST AND OVERVIEW/CONTROLLED SUBSTANCE AGREEMENT     Last Written Prescription Date:  2-19-18  Last Fill Quantity: 60,   # refills: 0      Controlled Substance Refill Request for Oxycodone ir     Problem List Complete:  No      Last Written Prescription Date:  2-19-18  Last Fill Quantity: 30,   # refills: 0        Last Office Visit with [facility] primary care provider: 9/26/17     Future Office visit:      Controlled substance agreement on file: No.      Processing:  Patient will  in clinic   checked in past 6 months?  Yes 2/19/18      Shama Roberts RN

## 2018-04-13 ENCOUNTER — MYC REFILL (OUTPATIENT)
Dept: FAMILY MEDICINE | Facility: CLINIC | Age: 28
End: 2018-04-13

## 2018-04-13 DIAGNOSIS — F32.2 MAJOR DEPRESSIVE DISORDER, SINGLE EPISODE, SEVERE WITHOUT PSYCHOTIC FEATURES (H): ICD-10-CM

## 2018-04-13 DIAGNOSIS — G44.219 EPISODIC TENSION-TYPE HEADACHE, NOT INTRACTABLE: ICD-10-CM

## 2018-04-13 DIAGNOSIS — G89.29 CHRONIC BILATERAL LOW BACK PAIN WITH RIGHT-SIDED SCIATICA: ICD-10-CM

## 2018-04-13 DIAGNOSIS — M54.41 CHRONIC BILATERAL LOW BACK PAIN WITH RIGHT-SIDED SCIATICA: ICD-10-CM

## 2018-04-13 DIAGNOSIS — F90.8 ATTENTION DEFICIT HYPERACTIVITY DISORDER (ADHD), OTHER TYPE: ICD-10-CM

## 2018-04-13 RX ORDER — CYCLOBENZAPRINE HCL 10 MG
5-10 TABLET ORAL
Qty: 30 TABLET | Refills: 1 | Status: CANCELLED | OUTPATIENT
Start: 2018-04-13

## 2018-04-13 RX ORDER — GABAPENTIN 300 MG/1
300 CAPSULE ORAL
Qty: 90 CAPSULE | Refills: 1 | Status: CANCELLED | OUTPATIENT
Start: 2018-04-13

## 2018-04-13 RX ORDER — CYCLOBENZAPRINE HCL 10 MG
5-10 TABLET ORAL
Qty: 30 TABLET | Refills: 1 | Status: SHIPPED | OUTPATIENT
Start: 2018-04-13 | End: 2018-07-13

## 2018-04-13 RX ORDER — SERTRALINE HYDROCHLORIDE 100 MG/1
TABLET, FILM COATED ORAL
Qty: 135 TABLET | Refills: 1 | Status: CANCELLED | OUTPATIENT
Start: 2018-04-13

## 2018-04-13 RX ORDER — BUTALBITAL, ACETAMINOPHEN AND CAFFEINE 50; 325; 40 MG/1; MG/1; MG/1
TABLET ORAL
Qty: 30 TABLET | Refills: 1 | Status: CANCELLED | OUTPATIENT
Start: 2018-04-13

## 2018-04-13 RX ORDER — DEXTROAMPHETAMINE SACCHARATE, AMPHETAMINE ASPARTATE, DEXTROAMPHETAMINE SULFATE AND AMPHETAMINE SULFATE 7.5; 7.5; 7.5; 7.5 MG/1; MG/1; MG/1; MG/1
30 TABLET ORAL 2 TIMES DAILY
Qty: 60 TABLET | Refills: 0 | Status: CANCELLED | OUTPATIENT
Start: 2018-04-13

## 2018-04-13 NOTE — TELEPHONE ENCOUNTER
Message from Shanit:  Original authorizing provider: MD Ousmane Baldwin III would like a refill of the following medications:  butalbital-acetaminophen-caffeine (FIORICET/ESGIC) -40 MG per tablet [Karan Waters MD]  cyclobenzaprine (FLEXERIL) 10 MG tablet [Karan Waters MD]  gabapentin (NEURONTIN) 300 MG capsule [Karan Waters MD]  sertraline (ZOLOFT) 100 MG tablet [Karan Waters MD]  amphetamine-dextroamphetamine (ADDERALL) 30 MG per tablet [Karan Waters MD]    Preferred pharmacy: [facility] [address] [facility] [address] [place] [address] AT [facility]    Comment:

## 2018-04-13 NOTE — TELEPHONE ENCOUNTER
Controlled Substance Refill Request for adderall      Problem List Complete:  No       PROVIDER TO CONSIDER COMPLETION OF PROBLEM LIST AND OVERVIEW/CONTROLLED SUBSTANCE AGREEMENT      Last Written Prescription Date:  3-15-18  Last Fill Quantity: 60,   # refills: 0           Controlled Substance Refill request for Fioricet  Problem list Complete:  No     Last Written prescription date:  2/19/18  Last Fill Quantity:  30  # Refills:  1      Last Office Visit with [facility] primary care provider: 9/26/17      Future Office visit:       Controlled substance agreement on file: No.      Processing:  Patient will  in clinic   checked in past 6 months?  Yes 2/19/18       Shama Roberts RN

## 2018-04-23 ENCOUNTER — MYC MEDICAL ADVICE (OUTPATIENT)
Dept: FAMILY MEDICINE | Facility: CLINIC | Age: 28
End: 2018-04-23

## 2018-04-24 ENCOUNTER — OFFICE VISIT (OUTPATIENT)
Dept: FAMILY MEDICINE | Facility: CLINIC | Age: 28
End: 2018-04-24
Payer: COMMERCIAL

## 2018-04-24 VITALS
WEIGHT: 200 LBS | TEMPERATURE: 97 F | OXYGEN SATURATION: 100 % | HEART RATE: 69 BPM | RESPIRATION RATE: 20 BRPM | BODY MASS INDEX: 26.51 KG/M2 | HEIGHT: 73 IN | DIASTOLIC BLOOD PRESSURE: 73 MMHG | SYSTOLIC BLOOD PRESSURE: 129 MMHG

## 2018-04-24 DIAGNOSIS — M54.41 CHRONIC BILATERAL LOW BACK PAIN WITH RIGHT-SIDED SCIATICA: ICD-10-CM

## 2018-04-24 DIAGNOSIS — G89.29 CHRONIC BILATERAL LOW BACK PAIN WITH RIGHT-SIDED SCIATICA: ICD-10-CM

## 2018-04-24 DIAGNOSIS — F33.0 MAJOR DEPRESSIVE DISORDER, RECURRENT EPISODE, MILD (H): ICD-10-CM

## 2018-04-24 DIAGNOSIS — F41.1 GENERALIZED ANXIETY DISORDER: Primary | ICD-10-CM

## 2018-04-24 DIAGNOSIS — F90.8 ATTENTION DEFICIT HYPERACTIVITY DISORDER (ADHD), OTHER TYPE: ICD-10-CM

## 2018-04-24 DIAGNOSIS — G44.219 EPISODIC TENSION-TYPE HEADACHE, NOT INTRACTABLE: ICD-10-CM

## 2018-04-24 PROCEDURE — 99214 OFFICE O/P EST MOD 30 MIN: CPT | Performed by: FAMILY MEDICINE

## 2018-04-24 RX ORDER — BUTALBITAL, ACETAMINOPHEN AND CAFFEINE 50; 325; 40 MG/1; MG/1; MG/1
TABLET ORAL
Qty: 60 TABLET | Refills: 1 | Status: SHIPPED | OUTPATIENT
Start: 2018-04-24 | End: 2018-06-05

## 2018-04-24 RX ORDER — DEXTROAMPHETAMINE SACCHARATE, AMPHETAMINE ASPARTATE, DEXTROAMPHETAMINE SULFATE AND AMPHETAMINE SULFATE 7.5; 7.5; 7.5; 7.5 MG/1; MG/1; MG/1; MG/1
30 TABLET ORAL 2 TIMES DAILY
Qty: 60 TABLET | Refills: 0 | Status: SHIPPED | OUTPATIENT
Start: 2018-05-23 | End: 2018-04-24

## 2018-04-24 RX ORDER — DEXTROAMPHETAMINE SACCHARATE, AMPHETAMINE ASPARTATE, DEXTROAMPHETAMINE SULFATE AND AMPHETAMINE SULFATE 7.5; 7.5; 7.5; 7.5 MG/1; MG/1; MG/1; MG/1
30 TABLET ORAL 2 TIMES DAILY
Qty: 60 TABLET | Refills: 0 | Status: SHIPPED | OUTPATIENT
Start: 2018-04-24 | End: 2018-04-24

## 2018-04-24 RX ORDER — DEXTROAMPHETAMINE SACCHARATE, AMPHETAMINE ASPARTATE, DEXTROAMPHETAMINE SULFATE AND AMPHETAMINE SULFATE 7.5; 7.5; 7.5; 7.5 MG/1; MG/1; MG/1; MG/1
30 TABLET ORAL 2 TIMES DAILY
Qty: 60 TABLET | Refills: 0 | Status: SHIPPED | OUTPATIENT
Start: 2018-06-22 | End: 2018-07-13

## 2018-04-24 ASSESSMENT — ANXIETY QUESTIONNAIRES
3. WORRYING TOO MUCH ABOUT DIFFERENT THINGS: SEVERAL DAYS
IF YOU CHECKED OFF ANY PROBLEMS ON THIS QUESTIONNAIRE, HOW DIFFICULT HAVE THESE PROBLEMS MADE IT FOR YOU TO DO YOUR WORK, TAKE CARE OF THINGS AT HOME, OR GET ALONG WITH OTHER PEOPLE: SOMEWHAT DIFFICULT
1. FEELING NERVOUS, ANXIOUS, OR ON EDGE: SEVERAL DAYS
6. BECOMING EASILY ANNOYED OR IRRITABLE: NOT AT ALL
2. NOT BEING ABLE TO STOP OR CONTROL WORRYING: SEVERAL DAYS
GAD7 TOTAL SCORE: 4
7. FEELING AFRAID AS IF SOMETHING AWFUL MIGHT HAPPEN: NOT AT ALL
5. BEING SO RESTLESS THAT IT IS HARD TO SIT STILL: NOT AT ALL

## 2018-04-24 ASSESSMENT — PATIENT HEALTH QUESTIONNAIRE - PHQ9: 5. POOR APPETITE OR OVEREATING: SEVERAL DAYS

## 2018-04-24 NOTE — LETTER
My Depression Action Plan  Name: Tejas HOPSON Mikalojczyk III   Date of Birth 1990  Date: 4/24/2018    My doctor: Karan Waters   My clinic: [facility]  [address] [place] [id number] [phone]          GREEN    ZONE   Good Control    What it looks like:     Things are going generally well. You have normal up s and down s. You may even feel depressed from time to time, but bad moods usually last less than a day.   What you need to do:  1. Continue to care for yourself (see self care plan)  2. Check your depression survival kit and update it as needed  3. Follow your physician s recommendations including any medication.  4. Do not stop taking medication unless you consult with your physician first.           YELLOW         ZONE Getting Worse    What it looks like:     Depression is starting to interfere with your life.     It may be hard to get out of bed; you may be starting to isolate yourself from others.    Symptoms of depression are starting to last most all day and this has happened for several days.     You may have suicidal thoughts but they are not constant.   What you need to do:     1. Call your care team, your response to treatment will improve if you keep your care team informed of your progress. Yellow periods are signs an adjustment may need to be made.     2. Continue your self-care, even if you have to fake it!    3. Talk to someone in your support network    4. Open up your depression survival kit           RED    ZONE Medical Alert - Get Help    What it looks like:     Depression is seriously interfering with your life.     You may experience these or other symptoms: You can t get out of bed most days, can t work or engage in other necessary activities, you have trouble taking care of basic hygiene, or basic responsibilities, thoughts of suicide or death that will not go away, self-injurious behavior.     What you need to do:  1. Call your care team  and request a same-day appointment. If they are not available (weekends or after hours) call your local crisis line, emergency room or 911.            Depression Self Care Plan / Survival Kit    Self-Care for Depression  Here s the deal. Your body and mind are really not as separate as most people think.  What you do and think affects how you feel and how you feel influences what you do and think. This means if you do things that people who feel good do, it will help you feel better.  Sometimes this is all it takes.  There is also a place for medication and therapy depending on how severe your depression is, so be sure to consult with your medical provider and/ or Behavioral Health Consultant if your symptoms are worsening or not improving.     In order to better manage my stress, I will:    Exercise  Get some form of exercise, every day. This will help reduce pain and release endorphins, the  feel good  chemicals in your brain. This is almost as good as taking antidepressants!  This is not the same as joining a gym and then never going! (they count on that by the way ) It can be as simple as just going for a walk or doing some gardening, anything that will get you moving.      Hygiene   Maintain good hygiene (Get out of bed in the morning, Make your bed, Brush your teeth, Take a shower, and Get dressed like you were going to work, even if you are unemployed).  If your clothes don't fit try to get ones that do.    Diet  I will strive to eat foods that are good for me, drink plenty of water, and avoid excessive sugar, caffeine, alcohol, and other mood-altering substances.  Some foods that are helpful in depression are: complex carbohydrates, B vitamins, flaxseed, fish or fish oil, fresh fruits and vegetables.    Psychotherapy  I agree to participate in Individual Therapy (if recommended).    Medication  If prescribed medications, I agree to take them.  Missing doses can result in serious side effects.  I understand  that drinking alcohol, or other illicit drug use, may cause potential side effects.  I will not stop my medication abruptly without first discussing it with my provider.    Staying Connected With Others  I will stay in touch with my friends, family members, and my primary care provider/team.    Use your imagination  Be creative.  We all have a creative side; it doesn t matter if it s oil painting, sand castles, or mud pies! This will also kick up the endorphins.    Witness Beauty  (AKA stop and smell the roses) Take a look outside, even in mid-winter. Notice colors, textures. Watch the squirrels and birds.     Service to others  Be of service to others.  There is always someone else in need.  By helping others we can  get out of ourselves  and remember the really important things.  This also provides opportunities for practicing all the other parts of the program.    Humor  Laugh and be silly!  Adjust your TV habits for less news and crime-drama and more comedy.    Control your stress  Try breathing deep, massage therapy, biofeedback, and meditation. Find time to relax each day.     My support system    Clinic Contact:  Phone number:    Contact 1:  Phone number:    Contact 2:  Phone number:    Yarsanism/:  Phone number:    Therapist:  Phone number:    Local crisis center:    Phone number:    Other community support:  Phone number:

## 2018-04-24 NOTE — MR AVS SNAPSHOT
After Visit Summary   4/24/2018    Tejas Villalba III    MRN: [id number]           Patient Information     Date Of Birth          1990        Visit Information        Provider Department      4/24/2018 9:30 AM Karan Waters MD [facility]        Today's Diagnoses     Generalized anxiety disorder    -  1    Attention deficit hyperactivity disorder (ADHD), other type        Episodic tension-type headache, not intractable        Major depressive disorder, recurrent episode, mild (H)        Chronic bilateral low back pain with right-sided sciatica           Follow-ups after your visit        Additional Services     PAIN MANAGEMENT REFERRAL       Your provider has referred you to: [facility] [phone] [facility]    **ANY DIAGNOSTIC TESTS THAT ARE NOT IN EPIC SHOULD BE SENT TO THE PAIN CENTER**    REGARDING OPIOID MEDICATIONS:  The discussion of opioids management, appropriateness of therapy, and dosing will be discussed in patients being seen for evaluation.  The pain management clinics are not long-term prescribing clinics, with transition of prescribing of medications ultimately going back to the referring provider/PCP.  If prescribing is taken over at the pain clinic, it is in actively involved patients whom are appropriate for opioids, urine drug screening is completed, and long-term prescribing plan has been determined.  Therefore, we will not be automatically taking over prescribing at the patient's first visit.  Is this agreeable to you? agrees.     Please be aware that coverage of these services is subject to the terms and limitations of your health insurance plan.  Call member services at your health plan with any benefit or coverage questions.      Please bring the following with you to your appointment:    (1) Any X-Rays, CTs or MRIs which have been performed.  Contact the facility where they were done to arrange for  prior to your scheduled appointment.   "  (2) List of current medications   (3) This referral request   (4) Any documents/labs given to you for this referral                  Who to contact     If you have questions or need follow up information about today's clinic visit or your schedule please contact [facility] directly at [phone].  Normal or non-critical lab and imaging results will be communicated to you by MyChart, letter or phone within 4 business days after the clinic has received the results. If you do not hear from us within 7 days, please contact the clinic through [employer] or phone. If you have a critical or abnormal lab result, we will notify you by phone as soon as possible.  Submit refill requests through [employer] or call your pharmacy and they will forward the refill request to us. Please allow 3 business days for your refill to be completed.          Additional Information About Your Visit        MyChart Information     [employer] gives you secure access to your electronic health record. If you see a primary care provider, you can also send messages to your care team and make appointments. If you have questions, please call your primary care clinic.  If you do not have a primary care provider, please call [phone] and they will assist you.        Care EveryWhere ID     This is your Care EveryWhere ID. This could be used by other organizations to access your [place] medical records  [id number]        Your Vitals Were     Pulse Temperature Respirations Height Pulse Oximetry BMI (Body Mass Index)    69 97  F (36.1  C) (Oral) 20 6' 1\" (1.854 m) 100% 26.39 kg/m2       Blood Pressure from Last 3 Encounters:   04/24/18 129/73   09/26/17 122/82   04/10/17 120/80    Weight from Last 3 Encounters:   04/24/18 200 lb (90.7 kg)   09/26/17 214 lb (97.1 kg)   04/10/17 216 lb (98 kg)              We Performed the Following     PAIN MANAGEMENT REFERRAL          Today's Medication Changes          These changes are accurate as of " 4/24/18  9:56 AM.  If you have any questions, ask your nurse or doctor.               Start taking these medicines.        Dose/Directions    amphetamine-dextroamphetamine 30 MG per tablet   Commonly known as:  ADDERALL   Used for:  Attention deficit hyperactivity disorder (ADHD), other type   Started by:  Karan Waters MD        Dose:  30 mg   Start taking on:  6/22/2018   Take 1 tablet (30 mg) by mouth 2 times daily   Quantity:  60 tablet   Refills:  0            Where to get your medicines      Some of these will need a paper prescription and others can be bought over the counter.  Ask your nurse if you have questions.     Bring a paper prescription for each of these medications     amphetamine-dextroamphetamine 30 MG per tablet    butalbital-acetaminophen-caffeine -40 MG per tablet                Primary Care Provider Office Phone # Fax #    Karan Waters [phone] [address] [facility] [address]        Equal Access to Services     [facility]: Hadii aad ku hadasho Soomaali, waaxda luqadaha, qaybta kaalmada adeegyada, waxay idiin hayaan perry kharash kai . So [facility] [phone].    ATENCIÓN: Si habla español, tiene a cai disposición servicios gratuitos de asistencia lingüística. [facility] [phone].    We comply with applicable federal civil rights laws and [place] laws. We do not discriminate on the basis of race, color, national origin, age, disability, sex, sexual orientation, or gender identity.            Thank you!     Thank you for choosing [facility]  for your care. Our goal is always to provide you with excellent care. Hearing back from our patients is one way we can continue to improve our services. Please take a few minutes to complete the written survey that you may receive in the mail after your visit with us. Thank you!             Your Updated Medication List - Protect others around you: Learn how to safely use, store and throw  away your medicines at www.disposemymeds.org.          This list is accurate as of 4/24/18  9:56 AM.  Always use your most recent med list.                   Brand Name Dispense Instructions for use Diagnosis    amphetamine-dextroamphetamine 30 MG per tablet   Start taking on:  6/22/2018    ADDERALL    60 tablet    Take 1 tablet (30 mg) by mouth 2 times daily    Attention deficit hyperactivity disorder (ADHD), other type       butalbital-acetaminophen-caffeine -40 MG per tablet    FIORICET/ESGIC    60 tablet    TAKE 1 TABLET BY MOUTH EVERY 6 HOURS AS NEEDED    Episodic tension-type headache, not intractable       cyclobenzaprine 10 MG tablet    FLEXERIL    30 tablet    Take 0.5-1 tablets (5-10 mg) by mouth nightly as needed for muscle spasms    Chronic bilateral low back pain with right-sided sciatica       gabapentin 300 MG capsule    NEURONTIN    90 capsule    Take 1 capsule (300 mg) by mouth nightly as needed Sleep/back pain    Chronic bilateral low back pain with right-sided sciatica       IBUPROFEN PO      Take 600 mg by mouth 2 times daily As needed        sertraline 100 MG tablet    ZOLOFT    135 tablet    1.5 tabs =150mg For depression/anxiety Pharmacy ok to hold prescription until due    Major depressive disorder, single episode, severe without psychotic features (H)

## 2018-04-25 ASSESSMENT — PATIENT HEALTH QUESTIONNAIRE - PHQ9: SUM OF ALL RESPONSES TO PHQ QUESTIONS 1-9: 2

## 2018-04-25 ASSESSMENT — ANXIETY QUESTIONNAIRES: GAD7 TOTAL SCORE: 4

## 2018-06-05 ENCOUNTER — MYC REFILL (OUTPATIENT)
Dept: FAMILY MEDICINE | Facility: CLINIC | Age: 28
End: 2018-06-05

## 2018-06-05 DIAGNOSIS — G44.219 EPISODIC TENSION-TYPE HEADACHE, NOT INTRACTABLE: ICD-10-CM

## 2018-06-05 DIAGNOSIS — M54.9 MID BACK PAIN: ICD-10-CM

## 2018-06-05 RX ORDER — BUTALBITAL, ACETAMINOPHEN AND CAFFEINE 50; 325; 40 MG/1; MG/1; MG/1
TABLET ORAL
Qty: 60 TABLET | Refills: 1 | Status: SHIPPED | OUTPATIENT
Start: 2018-06-05 | End: 2018-08-13

## 2018-06-05 RX ORDER — OXYCODONE HYDROCHLORIDE 10 MG/1
10 TABLET ORAL DAILY PRN
Qty: 30 TABLET | Refills: 0 | Status: SHIPPED | OUTPATIENT
Start: 2018-06-05 | End: 2018-06-22

## 2018-06-05 NOTE — TELEPHONE ENCOUNTER
Message from Shanit:  Original authorizing provider: MD Ousmane Baldwin III would like a refill of the following medications:  butalbital-acetaminophen-caffeine (FIORICET/ESGIC) -40 MG per tablet [Karan Waters MD]    Preferred pharmacy: [facility] [id number] [place] [address] AT [facility]    Comment:  Dr Waters , Could I get a two weeks prescription for the pain meds oxycodone my back has been hurting again this last month , I could also use a referral for the pain clinic . Appreciate anything you can do Thanks Ousmane [phone]

## 2018-06-05 NOTE — TELEPHONE ENCOUNTER
Controlled Substance Refill Request for Fioricet  Problem List Complete:  No     PROVIDER TO CONSIDER COMPLETION OF PROBLEM LIST AND OVERVIEW/CONTROLLED SUBSTANCE AGREEMENT    Last Written Prescription Date:  4/24/18  Last Fill Quantity: 60,   # refills: 0    Last Office Visit with [facility] primary care provider: 4/24/18    Controlled substance agreement on file: No.      checked in past 6 months?  Yes last filled on 5/10/18                 Controlled Substance Refill Request for Oxycodone  Problem List Complete:  No     PROVIDER TO CONSIDER COMPLETION OF PROBLEM LIST AND OVERVIEW/CONTROLLED SUBSTANCE AGREEMENT    Last Written Prescription Date:  3/15/18  Last Fill Quantity: 15,   # refills: 0    Last Office Visit with [facility] primary care provider: 4/24/18       checked in past 6 months?  Yes last filled on 3/18/18         Maddi MAYON, RN, CPN

## 2018-06-21 ENCOUNTER — MYC MEDICAL ADVICE (OUTPATIENT)
Dept: FAMILY MEDICINE | Facility: CLINIC | Age: 28
End: 2018-06-21

## 2018-06-21 DIAGNOSIS — M54.9 MID BACK PAIN: ICD-10-CM

## 2018-06-21 NOTE — TELEPHONE ENCOUNTER
Controlled Substance Refill Request for Oxycodone  Problem List Complete:  No      PROVIDER TO CONSIDER COMPLETION OF PROBLEM LIST AND OVERVIEW/CONTROLLED SUBSTANCE AGREEMENT     Last Written Prescription Date:  6/5/18  Last Fill Quantity: 30,   # refills: 0     Last Office Visit with [facility] primary care provider: 4/24/18         checked in past 6 months?  Yes, 2/19/18  Shama MAYON, RN

## 2018-06-22 ENCOUNTER — TELEPHONE (OUTPATIENT)
Dept: FAMILY MEDICINE | Facility: CLINIC | Age: 28
End: 2018-06-22

## 2018-06-22 ENCOUNTER — NURSE TRIAGE (OUTPATIENT)
Dept: NURSING | Facility: CLINIC | Age: 28
End: 2018-06-22

## 2018-06-22 RX ORDER — OXYCODONE HYDROCHLORIDE 10 MG/1
10 TABLET ORAL DAILY PRN
Qty: 30 TABLET | Refills: 0 | Status: SHIPPED | OUTPATIENT
Start: 2018-06-22 | End: 2018-09-24

## 2018-06-23 NOTE — TELEPHONE ENCOUNTER
Patient reports that his doctor made a mistake when he prescribed his oxycodone today. The pharmacy won't fill it, as his last prescription said a maximum of 60 tablets per month. The last prescription was written 2 weeks ago. Nurse told patient that an on call doctor tonight could not remedy his issue since the medication requires a paper prescription with a doctor's signature on it.   Amalia Peña RN/FNA

## 2018-06-25 NOTE — TELEPHONE ENCOUNTER
Left message on answering machine for patient to call back to [phone].  Shama Roberts BSN, RN

## 2018-06-25 NOTE — TELEPHONE ENCOUNTER
I'm sorry. Patient will need to take prescription as written and see a pain specialist asap. Karan Waters MD

## 2018-07-13 ENCOUNTER — MYC REFILL (OUTPATIENT)
Dept: FAMILY MEDICINE | Facility: CLINIC | Age: 28
End: 2018-07-13

## 2018-07-13 DIAGNOSIS — F90.8 ATTENTION DEFICIT HYPERACTIVITY DISORDER (ADHD), OTHER TYPE: ICD-10-CM

## 2018-07-13 DIAGNOSIS — G89.29 CHRONIC BILATERAL LOW BACK PAIN WITH RIGHT-SIDED SCIATICA: ICD-10-CM

## 2018-07-13 DIAGNOSIS — M54.41 CHRONIC BILATERAL LOW BACK PAIN WITH RIGHT-SIDED SCIATICA: ICD-10-CM

## 2018-07-13 DIAGNOSIS — F32.2 MAJOR DEPRESSIVE DISORDER, SINGLE EPISODE, SEVERE WITHOUT PSYCHOTIC FEATURES (H): ICD-10-CM

## 2018-07-13 DIAGNOSIS — G44.219 EPISODIC TENSION-TYPE HEADACHE, NOT INTRACTABLE: ICD-10-CM

## 2018-07-13 RX ORDER — DEXTROAMPHETAMINE SACCHARATE, AMPHETAMINE ASPARTATE, DEXTROAMPHETAMINE SULFATE AND AMPHETAMINE SULFATE 7.5; 7.5; 7.5; 7.5 MG/1; MG/1; MG/1; MG/1
30 TABLET ORAL 2 TIMES DAILY
Qty: 60 TABLET | Refills: 0 | Status: SHIPPED | OUTPATIENT
Start: 2018-07-13 | End: 2018-08-13

## 2018-07-13 RX ORDER — BUTALBITAL, ACETAMINOPHEN AND CAFFEINE 50; 325; 40 MG/1; MG/1; MG/1
TABLET ORAL
Qty: 60 TABLET | Refills: 1 | Status: CANCELLED | OUTPATIENT
Start: 2018-07-13

## 2018-07-13 RX ORDER — GABAPENTIN 300 MG/1
300 CAPSULE ORAL
Qty: 90 CAPSULE | Refills: 1 | Status: CANCELLED | OUTPATIENT
Start: 2018-07-13

## 2018-07-13 RX ORDER — CYCLOBENZAPRINE HCL 10 MG
5-10 TABLET ORAL
Qty: 30 TABLET | Refills: 1 | Status: SHIPPED | OUTPATIENT
Start: 2018-07-13 | End: 2019-04-12

## 2018-07-13 RX ORDER — SERTRALINE HYDROCHLORIDE 100 MG/1
TABLET, FILM COATED ORAL
Qty: 135 TABLET | Refills: 1 | Status: CANCELLED | OUTPATIENT
Start: 2018-07-13

## 2018-07-13 NOTE — TELEPHONE ENCOUNTER
Pt requesting adderall and flexeril prescription's.      Controlled Substance Refill Request for adderall      Problem List Complete:  No       PROVIDER TO CONSIDER COMPLETION OF PROBLEM LIST AND OVERVIEW/CONTROLLED SUBSTANCE AGREEMENT      Last Written Prescription Date:  6-22-18  Last Fill Quantity: 60,   # refills: 0           Last Office Visit with [facility] primary care provider: 4/24/18      Future Office visit:       Controlled substance agreement on file: No.      Processing:  Patient will  in clinic   checked in past 6 months?  Yes 7/13/18   Shama MAYON, RN

## 2018-07-13 NOTE — TELEPHONE ENCOUNTER
Message from Shanit:  Original authorizing provider: MD Ousmane Baldwin III would like a refill of the following medications:  gabapentin (NEURONTIN) 300 MG capsule [Karan Watres MD]  sertraline (ZOLOFT) 100 MG tablet [Karan Waters MD]  cyclobenzaprine (FLEXERIL) 10 MG tablet [Karan Waters MD]  amphetamine-dextroamphetamine (ADDERALL) 30 MG per tablet [Karan Waters MD]  butalbital-acetaminophen-caffeine (FIORICET/ESGIC) -40 MG per tablet [Karan Watres MD]    Preferred pharmacy: [facility] [id number] [facility] [address] [place] [address] AT [facility]    Comment:

## 2018-07-24 ENCOUNTER — TELEPHONE (OUTPATIENT)
Dept: FAMILY MEDICINE | Facility: CLINIC | Age: 28
End: 2018-07-24

## 2018-07-24 NOTE — TELEPHONE ENCOUNTER
Prior Authorization Retail Medication Request    Medication/Dose: oxyCODONE IR (ROXICODONE) 10 MG tablet  ICD code (if different than what is on RX):  Mid back pain [M54.9]       Insurance Phone #:  [phone]  Insurance ID:  [id number]      Pharmacy Information (if different than what is on RX)  Name:  [facility]  Phone:  [phone]

## 2018-07-24 NOTE — TELEPHONE ENCOUNTER
PA Initiation    Medication: oxyCODONE IR (ROXICODONE) 10 MG tablet  Insurance Company: [employer] - Phone [phone] Fax [phone]  Pharmacy Filling the Rx: [facility] [address] [facility] [place]  [address] [facility] AT [facility]  Filling Pharmacy Phone: [phone]  Filling Pharmacy Fax:    Start Date: 7/24/2018    THIS HAS BEEN SUBMITTED BY THE PRIOR-AUTHORIZATION TEAM. ANY QUESTIONS PLEASE CALL [phone]. THANK YOU

## 2018-07-24 NOTE — TELEPHONE ENCOUNTER
Prior Authorization Approval    Authorization Effective Date: 6/24/2018  Authorization Expiration Date: 7/24/2019  Medication: oxyCODONE IR (ROXICODONE) 10 MG tablet approved   Approved Dose/Quantity:   Reference #:     Insurance Company: [employer] - Phone [phone] Fax [phone]  Expected CoPay:       CoPay Card Available:      Foundation Assistance Needed:    Which Pharmacy is filling the prescription (Not needed for infusion/clinic administered): [facility] [address] [facility] [address] [facility] AT [facility]  Pharmacy Notified: Yes  Patient Notified: Yes

## 2018-08-13 ENCOUNTER — MYC REFILL (OUTPATIENT)
Dept: FAMILY MEDICINE | Facility: CLINIC | Age: 28
End: 2018-08-13

## 2018-08-13 DIAGNOSIS — F90.8 ATTENTION DEFICIT HYPERACTIVITY DISORDER (ADHD), OTHER TYPE: ICD-10-CM

## 2018-08-13 DIAGNOSIS — G44.219 EPISODIC TENSION-TYPE HEADACHE, NOT INTRACTABLE: ICD-10-CM

## 2018-08-13 RX ORDER — BUTALBITAL, ACETAMINOPHEN AND CAFFEINE 50; 325; 40 MG/1; MG/1; MG/1
TABLET ORAL
Qty: 60 TABLET | Refills: 1 | Status: SHIPPED | OUTPATIENT
Start: 2018-08-13 | End: 2018-10-25

## 2018-08-13 RX ORDER — DEXTROAMPHETAMINE SACCHARATE, AMPHETAMINE ASPARTATE, DEXTROAMPHETAMINE SULFATE AND AMPHETAMINE SULFATE 7.5; 7.5; 7.5; 7.5 MG/1; MG/1; MG/1; MG/1
30 TABLET ORAL 2 TIMES DAILY
Qty: 60 TABLET | Refills: 0 | Status: SHIPPED | OUTPATIENT
Start: 2018-08-13 | End: 2018-09-07

## 2018-08-13 NOTE — TELEPHONE ENCOUNTER
Message from MyChart:  Original authorizing provider: MD Ousmane Baldwin III would like a refill of the following medications:  butalbital-acetaminophen-caffeine (FIORICET/ESGIC) -40 MG per tablet [Karan Waters MD]  amphetamine-dextroamphetamine (ADDERALL) 30 MG per tablet [Karan Waters MD]    Preferred pharmacy: [facility] [id number] [place] [address] AT [facility]    Comment:

## 2018-08-13 NOTE — TELEPHONE ENCOUNTER
Pt requesting adderall and fioricet prescription         Controlled Substance Refill Request for adderall      Problem List Complete:  No       PROVIDER TO CONSIDER COMPLETION OF PROBLEM LIST AND OVERVIEW/CONTROLLED SUBSTANCE AGREEMENT      Last Written Prescription Date:  7/13/18  Last Fill Quantity: 60,   # refills: 0           [facility] care provider: 4/24/18      Future Office visit:       Controlled substance agreement on file: No.      Processing:  Patient will  in clinic   checked in past 6 months?  Yes 7/13/18

## 2018-09-07 ENCOUNTER — MYC MEDICAL ADVICE (OUTPATIENT)
Dept: FAMILY MEDICINE | Facility: CLINIC | Age: 28
End: 2018-09-07

## 2018-09-07 DIAGNOSIS — F90.8 ATTENTION DEFICIT HYPERACTIVITY DISORDER (ADHD), OTHER TYPE: ICD-10-CM

## 2018-09-07 RX ORDER — DEXTROAMPHETAMINE SACCHARATE, AMPHETAMINE ASPARTATE, DEXTROAMPHETAMINE SULFATE AND AMPHETAMINE SULFATE 7.5; 7.5; 7.5; 7.5 MG/1; MG/1; MG/1; MG/1
30 TABLET ORAL 2 TIMES DAILY
Qty: 28 TABLET | Refills: 0 | Status: SHIPPED | OUTPATIENT
Start: 2018-09-07 | End: 2018-09-24

## 2018-09-07 NOTE — TELEPHONE ENCOUNTER
Can you give him a refill or do you want to see him for an appointment?Deirdre Munoz MA/RAHEEL

## 2018-09-24 ENCOUNTER — OFFICE VISIT (OUTPATIENT)
Dept: FAMILY MEDICINE | Facility: CLINIC | Age: 28
End: 2018-09-24
Payer: COMMERCIAL

## 2018-09-24 VITALS
OXYGEN SATURATION: 100 % | RESPIRATION RATE: 20 BRPM | HEIGHT: 73 IN | BODY MASS INDEX: 27.3 KG/M2 | HEART RATE: 105 BPM | TEMPERATURE: 98.2 F | DIASTOLIC BLOOD PRESSURE: 82 MMHG | WEIGHT: 206 LBS | SYSTOLIC BLOOD PRESSURE: 133 MMHG

## 2018-09-24 DIAGNOSIS — G89.29 CHRONIC BILATERAL LOW BACK PAIN WITH RIGHT-SIDED SCIATICA: ICD-10-CM

## 2018-09-24 DIAGNOSIS — F90.8 ATTENTION DEFICIT HYPERACTIVITY DISORDER (ADHD), OTHER TYPE: ICD-10-CM

## 2018-09-24 DIAGNOSIS — F32.2 MAJOR DEPRESSIVE DISORDER, SINGLE EPISODE, SEVERE WITHOUT PSYCHOTIC FEATURES (H): ICD-10-CM

## 2018-09-24 DIAGNOSIS — Z72.0 TOBACCO ABUSE: ICD-10-CM

## 2018-09-24 DIAGNOSIS — M54.41 CHRONIC BILATERAL LOW BACK PAIN WITH RIGHT-SIDED SCIATICA: ICD-10-CM

## 2018-09-24 PROCEDURE — 99214 OFFICE O/P EST MOD 30 MIN: CPT | Performed by: FAMILY MEDICINE

## 2018-09-24 RX ORDER — GABAPENTIN 300 MG/1
300 CAPSULE ORAL
Qty: 90 CAPSULE | Refills: 1 | Status: SHIPPED | OUTPATIENT
Start: 2018-09-24 | End: 2019-05-09

## 2018-09-24 RX ORDER — DEXTROAMPHETAMINE SACCHARATE, AMPHETAMINE ASPARTATE, DEXTROAMPHETAMINE SULFATE AND AMPHETAMINE SULFATE 7.5; 7.5; 7.5; 7.5 MG/1; MG/1; MG/1; MG/1
30 TABLET ORAL 2 TIMES DAILY
Qty: 60 TABLET | Refills: 0 | Status: SHIPPED | OUTPATIENT
Start: 2018-09-24 | End: 2018-09-24

## 2018-09-24 RX ORDER — SERTRALINE HYDROCHLORIDE 100 MG/1
TABLET, FILM COATED ORAL
Qty: 135 TABLET | Refills: 1 | Status: SHIPPED | OUTPATIENT
Start: 2018-09-24 | End: 2019-03-11

## 2018-09-24 RX ORDER — DEXTROAMPHETAMINE SACCHARATE, AMPHETAMINE ASPARTATE, DEXTROAMPHETAMINE SULFATE AND AMPHETAMINE SULFATE 7.5; 7.5; 7.5; 7.5 MG/1; MG/1; MG/1; MG/1
30 TABLET ORAL 2 TIMES DAILY
Qty: 60 TABLET | Refills: 0 | Status: SHIPPED | OUTPATIENT
Start: 2018-11-22 | End: 2018-12-18

## 2018-09-24 RX ORDER — DEXTROAMPHETAMINE SACCHARATE, AMPHETAMINE ASPARTATE, DEXTROAMPHETAMINE SULFATE AND AMPHETAMINE SULFATE 7.5; 7.5; 7.5; 7.5 MG/1; MG/1; MG/1; MG/1
30 TABLET ORAL 2 TIMES DAILY
Qty: 60 TABLET | Refills: 0 | Status: SHIPPED | OUTPATIENT
Start: 2018-10-23 | End: 2018-09-24

## 2018-09-24 ASSESSMENT — ANXIETY QUESTIONNAIRES
GAD7 TOTAL SCORE: 3
7. FEELING AFRAID AS IF SOMETHING AWFUL MIGHT HAPPEN: SEVERAL DAYS
IF YOU CHECKED OFF ANY PROBLEMS ON THIS QUESTIONNAIRE, HOW DIFFICULT HAVE THESE PROBLEMS MADE IT FOR YOU TO DO YOUR WORK, TAKE CARE OF THINGS AT HOME, OR GET ALONG WITH OTHER PEOPLE: SOMEWHAT DIFFICULT
2. NOT BEING ABLE TO STOP OR CONTROL WORRYING: NOT AT ALL
6. BECOMING EASILY ANNOYED OR IRRITABLE: NOT AT ALL
1. FEELING NERVOUS, ANXIOUS, OR ON EDGE: SEVERAL DAYS
5. BEING SO RESTLESS THAT IT IS HARD TO SIT STILL: NOT AT ALL
3. WORRYING TOO MUCH ABOUT DIFFERENT THINGS: SEVERAL DAYS

## 2018-09-24 ASSESSMENT — PATIENT HEALTH QUESTIONNAIRE - PHQ9: 5. POOR APPETITE OR OVEREATING: NOT AT ALL

## 2018-09-24 NOTE — PROGRESS NOTES
"SUBJECTIVE:  Tejas Villalba III, a 27 year old male scheduled an appointment to discuss the following issues:  Follow-up adhd, anxiety, migraines, tobacco abuse and chronic pain issues.  Work busy and stressful. 3.4yo son.   Stress with mother of baby.  past year.   No dating elsewhere. Living with parents. Seeing son most days.   Emotionally overall ok.   Migraines on/off. Exercise on/off.   No SUICIAL IDEATION OR HOMOCIDAL IDEATION OR BERNADETTE.   Sleep hit/miss.   Smoking up to 1ppd on/off. Gum helpful.       Medical, social, surgical, and family histories reviewed.    ROS:  All other ROS negative    OBJECTIVE:  /82  Pulse 105  Temp 98.2  F (36.8  C) (Oral)  Resp 20  Ht 6' 1\" (1.854 m)  Wt 206 lb (93.4 kg)  SpO2 100%  BMI 27.18 kg/m2  EXAM:  GENERAL APPEARANCE: healthy, alert and no distress  NECK: no adenopathy, no asymmetry, masses, or scars and thyroid normal to palpation  RESP: lungs clear to auscultation - no rales, rhonchi or wheezes  CV: regular rates and rhythm, normal S1 S2, no S3 or S4 and no murmur, click or rub -  ABDOMEN:  soft, nontender, no HSM or masses and bowel sounds normal  MS: extremities normal- no gross deformities noted, no evidence of inflammation in joints, FROM in all extremities.  MS:tight lumbar paraspinous muscles.   NEURO: Normal strength and tone, sensory exam grossly normal, mentation intact and speech normal  PSYCH: mentation appears normal and affect normal/bright    ASSESSMENT / PLAN:  (Z72.0) Tobacco abuse  Comment: gum helpful  Plan: nicotine polacrilex (NICORETTE) 2 MG gum        Reveiwed risks and side effects of medication      (F90.8) Attention deficit hyperactivity disorder (ADHD), other type  Comment: stable  Plan: amphetamine-dextroamphetamine (ADDERALL) 30 MG         per tablet, DISCONTINUED:         amphetamine-dextroamphetamine (ADDERALL) 30 MG         per tablet, DISCONTINUED:         amphetamine-dextroamphetamine (ADDERALL) 30 MG         " per tablet        Reveiwed risks and side effects of medication      (F32.2) Major depressive disorder, single episode, severe without psychotic features (H)  Comment: anger issues  Plan: sertraline (ZOLOFT) 100 MG tablet        Meditation/deep breaths and more exercise. Trazodone prn sleep helpful - call for new refill if needed. Continue limit ALCOHOL and caffeine. If SUICIAL IDEATION OR HOMOCIDAL IDEATION OR BERNADETTE TO ER. Discussed looking for new/less stressful job.     (M54.41,  G89.29) Chronic bilateral low back pain with right-sided sciatica  Comment: stable  Plan: gabapentin (NEURONTIN) 300 MG capsule       Pain specialist if worse. Tylenol/ibuprofen.     Karan Waters MD

## 2018-09-24 NOTE — MR AVS SNAPSHOT
After Visit Summary   9/24/2018    Tejas Villalba III    MRN: [id number]           Patient Information     Date Of Birth          1990        Visit Information        Provider Department      9/24/2018 9:55 AM Karan Waters MD [facility]        Today's Diagnoses     Tobacco abuse        Attention deficit hyperactivity disorder (ADHD), other type        Major depressive disorder, single episode, severe without psychotic features (H)        Chronic bilateral low back pain with right-sided sciatica           Follow-ups after your visit        Who to contact     If you have questions or need follow up information about today's clinic visit or your schedule please contact [facility] directly at [phone].  Normal or non-critical lab and imaging results will be communicated to you by MyChart, letter or phone within 4 business days after the clinic has received the results. If you do not hear from us within 7 days, please contact the clinic through [employer] or phone. If you have a critical or abnormal lab result, we will notify you by phone as soon as possible.  Submit refill requests through [employer] or call your pharmacy and they will forward the refill request to us. Please allow 3 business days for your refill to be completed.          Additional Information About Your Visit        MyChart Information     [employer] gives you secure access to your electronic health record. If you see a primary care provider, you can also send messages to your care team and make appointments. If you have questions, please call your primary care clinic.  If you do not have a primary care provider, please call [phone] and they will assist you.        Care EveryWhere ID     This is your Care EveryWhere ID. This could be used by other organizations to access your [place] medical records  [id number]        Your Vitals Were     Pulse Temperature Respirations Height Pulse  "Oximetry BMI (Body Mass Index)    105 98.2  F (36.8  C) (Oral) 20 6' 1\" (1.854 m) 100% 27.18 kg/m2       Blood Pressure from Last 3 Encounters:   09/24/18 133/82   04/24/18 129/73   09/26/17 122/82    Weight from Last 3 Encounters:   09/24/18 206 lb (93.4 kg)   04/24/18 200 lb (90.7 kg)   09/26/17 214 lb (97.1 kg)              Today, you had the following     No orders found for display         Today's Medication Changes          These changes are accurate as of 9/24/18 10:37 AM.  If you have any questions, ask your nurse or doctor.               Start taking these medicines.        Dose/Directions    amphetamine-dextroamphetamine 30 MG per tablet   Commonly known as:  ADDERALL   Used for:  Attention deficit hyperactivity disorder (ADHD), other type   Started by:  Karan Waters MD        Dose:  30 mg   Start taking on:  11/22/2018   Take 1 tablet (30 mg) by mouth 2 times daily   Quantity:  60 tablet   Refills:  0            Where to get your medicines      These medications were sent to [facility] [address] [place] [address] AT [facility] & [address] [address]     Phone:  [phone]     gabapentin 300 MG capsule    nicotine polacrilex 2 MG gum    sertraline 100 MG tablet         Some of these will need a paper prescription and others can be bought over the counter.  Ask your nurse if you have questions.     Bring a paper prescription for each of these medications     amphetamine-dextroamphetamine 30 MG per tablet                Primary Care Provider Office Phone # Fax #    Karan Waters [phone] [address] [facility] [address]        Equal Access to Services     WANDA SEGURA AH: Anil العلي, rebeca bryant, black kaalmaang jackson. So [facility] [phone].    ATENCIÓN: Si habla español, tiene a cai disposición servicios gratuitos de asistencia " lingüística. Mickey al [phone].    We comply with applicable federal civil rights laws and [place] laws. We do not discriminate on the basis of race, color, national origin, age, disability, sex, sexual orientation, or gender identity.            Thank you!     [facility] you [facility]. Our goal is always to provide you with excellent care. Hearing back from our patients is one way we can continue to improve our services. Please take a few minutes to complete the written survey that you may receive in the mail after your visit with us. Thank you!             Your Updated Medication List - Protect others around you: Learn how to safely use, store and throw away your medicines at www.disposemymeds.org.          This list is accurate as of 9/24/18 10:37 AM.  Always use your most recent med list.                   Brand Name Dispense Instructions for use Diagnosis    amphetamine-dextroamphetamine 30 MG per tablet   Start taking on:  11/22/2018    ADDERALL    60 tablet    Take 1 tablet (30 mg) by mouth 2 times daily    Attention deficit hyperactivity disorder (ADHD), other type       butalbital-acetaminophen-caffeine -40 MG per tablet    FIORICET/ESGIC    60 tablet    TAKE 1 TABLET BY MOUTH EVERY 6 HOURS AS NEEDED    Episodic tension-type headache, not intractable       cyclobenzaprine 10 MG tablet    FLEXERIL    30 tablet    Take 0.5-1 tablets (5-10 mg) by mouth nightly as needed for muscle spasms    Chronic bilateral low back pain with right-sided sciatica       gabapentin 300 MG capsule    NEURONTIN    90 capsule    Take 1 capsule (300 mg) by mouth nightly as needed Sleep/back pain    Chronic bilateral low back pain with right-sided sciatica       IBUPROFEN PO      Take 600 mg by mouth 2 times daily As needed        nicotine polacrilex 2 MG gum    NICORETTE    30 tablet    Place 1 each (2 mg) inside cheek as needed for smoking cessation    Tobacco abuse        sertraline 100 MG tablet    ZOLOFT    135 tablet    1.5 tabs =150mg For depression/anxiety Pharmacy ok to hold prescription until due    Major depressive disorder, single episode, severe without psychotic features (H)

## 2018-09-24 NOTE — NURSING NOTE
"Chief Complaint   Patient presents with     A.D.H.D     Medication Therapy Management       Initial /82  Pulse 105  Temp 98.2  F (36.8  C) (Oral)  Resp 20  Ht 6' 1\" (1.854 m)  Wt 206 lb (93.4 kg)  SpO2 100%  BMI 27.18 kg/m2 Estimated body mass index is 27.18 kg/(m^2) as calculated from the following:    Height as of this encounter: 6' 1\" (1.854 m).    Weight as of this encounter: 206 lb (93.4 kg).    Shama Stacy CMA    "

## 2018-09-25 ENCOUNTER — MYC MEDICAL ADVICE (OUTPATIENT)
Dept: FAMILY MEDICINE | Facility: CLINIC | Age: 28
End: 2018-09-25

## 2018-09-25 DIAGNOSIS — F41.1 GENERALIZED ANXIETY DISORDER: ICD-10-CM

## 2018-09-25 DIAGNOSIS — R45.4 ANGER: Primary | ICD-10-CM

## 2018-09-25 ASSESSMENT — ANXIETY QUESTIONNAIRES: GAD7 TOTAL SCORE: 3

## 2018-09-25 ASSESSMENT — PATIENT HEALTH QUESTIONNAIRE - PHQ9: SUM OF ALL RESPONSES TO PHQ QUESTIONS 1-9: 5

## 2018-09-25 NOTE — TELEPHONE ENCOUNTER
RN contacted pt by phone to discuss incoming [employer] message and provider response and referral.     Pt states he feels that the Mental Health Referral placed by provider today is sufficient for his current needs. Pt does not wish to pursue scheduling with [facility] at this time and does not feel that his sx are worsening. Pt states he has been given referrals in the past, but if he is given too many referrals or recommendations he does not follow through. Pt states he is agreeable and ready to work with the [facility]. Pt given phone number from referral and states he will call to schedule an appointment. Pt states he has no additional questions or concerns at this time.  Child/Adolescent or Adult Adult       Which Service? Assessments and Testing      Assessment & Testing General Psychological Testing      General Psychological Testing Location [facility]: [facility] [phone]      Simran Palm RN

## 2018-09-25 NOTE — TELEPHONE ENCOUNTER
Please give # for our therapists or can see bib. If SUICIAL IDEATION OR HOMOCIDAL IDEATION OR BERNADETTE TO TIFFANY Waters MD referral placed.

## 2018-11-26 ENCOUNTER — MYC MEDICAL ADVICE (OUTPATIENT)
Dept: FAMILY MEDICINE | Facility: CLINIC | Age: 28
End: 2018-11-26

## 2018-11-26 DIAGNOSIS — F41.1 GENERALIZED ANXIETY DISORDER: Primary | ICD-10-CM

## 2018-11-26 RX ORDER — ALPRAZOLAM 0.25 MG
0.25 TABLET ORAL 2 TIMES DAILY PRN
Qty: 15 TABLET | Refills: 0 | Status: SHIPPED | OUTPATIENT
Start: 2018-11-26 | End: 2018-12-18

## 2018-11-26 NOTE — TELEPHONE ENCOUNTER
Controlled Substance Refill Request for xanax  Problem List Complete:  No     PROVIDER TO CONSIDER COMPLETION OF PROBLEM LIST AND OVERVIEW/CONTROLLED SUBSTANCE AGREEMENT    Last Written Prescription Date:  1/10/14  Last Fill Quantity: 20,   # refills: 0    Last Office Visit with [facility] primary care provider: 9/24/18    Future Office visit:     Controlled substance agreement on file: No.     Processing:  Fax Rx to [facility] pharmacy   checked in past 3 months?  Yes 11/26/18   Shama MAYON, RN

## 2018-12-14 ENCOUNTER — MYC REFILL (OUTPATIENT)
Dept: FAMILY MEDICINE | Facility: CLINIC | Age: 28
End: 2018-12-14

## 2018-12-14 DIAGNOSIS — G44.219 EPISODIC TENSION-TYPE HEADACHE, NOT INTRACTABLE: ICD-10-CM

## 2018-12-14 DIAGNOSIS — F90.8 ATTENTION DEFICIT HYPERACTIVITY DISORDER (ADHD), OTHER TYPE: ICD-10-CM

## 2018-12-14 DIAGNOSIS — F32.2 MAJOR DEPRESSIVE DISORDER, SINGLE EPISODE, SEVERE WITHOUT PSYCHOTIC FEATURES (H): ICD-10-CM

## 2018-12-14 DIAGNOSIS — Z72.0 TOBACCO ABUSE: ICD-10-CM

## 2018-12-14 RX ORDER — SERTRALINE HYDROCHLORIDE 100 MG/1
TABLET, FILM COATED ORAL
Qty: 135 TABLET | Refills: 1 | Status: CANCELLED | OUTPATIENT
Start: 2018-12-14

## 2018-12-14 RX ORDER — DEXTROAMPHETAMINE SACCHARATE, AMPHETAMINE ASPARTATE, DEXTROAMPHETAMINE SULFATE AND AMPHETAMINE SULFATE 7.5; 7.5; 7.5; 7.5 MG/1; MG/1; MG/1; MG/1
30 TABLET ORAL 2 TIMES DAILY
Qty: 60 TABLET | Refills: 0 | Status: CANCELLED | OUTPATIENT
Start: 2018-12-14

## 2018-12-14 NOTE — TELEPHONE ENCOUNTER
Controlled Substance Refill Request for butalbital-acetaminophen-caffeine  Problem List Complete:  No     PROVIDER TO CONSIDER COMPLETION OF PROBLEM LIST AND OVERVIEW/CONTROLLED SUBSTANCE AGREEMENT    Last Written Prescription Date:  10/25/18  Last Fill Quantity: 20,   # refills: 0    Last Office Visit with [facility] primary care provider: 9/24/18    Future Office visit:     Controlled substance agreement on file: No.     Processing:  Fax Rx to [facility] pharmacy   checked in past 3 months?  Unable to access  for Dr. Waters currently.  Shama MAYON, RN

## 2018-12-18 ENCOUNTER — MYC MEDICAL ADVICE (OUTPATIENT)
Dept: FAMILY MEDICINE | Facility: CLINIC | Age: 28
End: 2018-12-18

## 2018-12-18 DIAGNOSIS — Z72.0 TOBACCO ABUSE: ICD-10-CM

## 2018-12-18 DIAGNOSIS — F90.8 ATTENTION DEFICIT HYPERACTIVITY DISORDER (ADHD), OTHER TYPE: ICD-10-CM

## 2018-12-18 RX ORDER — DEXTROAMPHETAMINE SACCHARATE, AMPHETAMINE ASPARTATE, DEXTROAMPHETAMINE SULFATE AND AMPHETAMINE SULFATE 7.5; 7.5; 7.5; 7.5 MG/1; MG/1; MG/1; MG/1
30 TABLET ORAL 2 TIMES DAILY
Qty: 60 TABLET | Refills: 0 | Status: SHIPPED | OUTPATIENT
Start: 2018-12-18 | End: 2019-01-14

## 2018-12-18 RX ORDER — BUTALBITAL, ACETAMINOPHEN AND CAFFEINE 50; 325; 40 MG/1; MG/1; MG/1
TABLET ORAL
Qty: 20 TABLET | Refills: 1 | Status: SHIPPED | OUTPATIENT
Start: 2018-12-18 | End: 2019-01-14

## 2019-01-14 ENCOUNTER — MYC REFILL (OUTPATIENT)
Dept: FAMILY MEDICINE | Facility: CLINIC | Age: 29
End: 2019-01-14

## 2019-01-14 DIAGNOSIS — F90.8 ATTENTION DEFICIT HYPERACTIVITY DISORDER (ADHD), OTHER TYPE: ICD-10-CM

## 2019-01-14 DIAGNOSIS — F41.1 GENERALIZED ANXIETY DISORDER: ICD-10-CM

## 2019-01-14 DIAGNOSIS — G44.219 EPISODIC TENSION-TYPE HEADACHE, NOT INTRACTABLE: ICD-10-CM

## 2019-01-15 RX ORDER — ALPRAZOLAM 0.25 MG
0.25 TABLET ORAL 2 TIMES DAILY PRN
Qty: 15 TABLET | Refills: 0 | Status: SHIPPED | OUTPATIENT
Start: 2019-01-15 | End: 2019-02-14

## 2019-01-15 RX ORDER — BUTALBITAL, ACETAMINOPHEN AND CAFFEINE 50; 325; 40 MG/1; MG/1; MG/1
TABLET ORAL
Qty: 20 TABLET | Refills: 1 | Status: SHIPPED | OUTPATIENT
Start: 2019-01-15 | End: 2019-02-14

## 2019-01-15 RX ORDER — DEXTROAMPHETAMINE SACCHARATE, AMPHETAMINE ASPARTATE, DEXTROAMPHETAMINE SULFATE AND AMPHETAMINE SULFATE 7.5; 7.5; 7.5; 7.5 MG/1; MG/1; MG/1; MG/1
30 TABLET ORAL 2 TIMES DAILY
Qty: 60 TABLET | Refills: 0 | Status: SHIPPED | OUTPATIENT
Start: 2019-01-15 | End: 2019-02-14

## 2019-01-15 NOTE — TELEPHONE ENCOUNTER
Controlled Substance Refill Request for Adderall 30mg  Problem List Complete:  No     PROVIDER TO CONSIDER COMPLETION OF PROBLEM LIST AND OVERVIEW/CONTROLLED SUBSTANCE AGREEMENT    Last Written Prescription Date:  12/18/18  Last Fill Quantity: 60,   # refills: 0    THE MOST RECENT OFFICE VISIT MUST BE WITHIN THE PAST 3 MONTHS. (AT LEAST ONE FACE TO FACE VISIT MUST OCCUR EVERY 6 MONTHS. ADDITIONAL VISITS CAN BE VIRTUAL.)    [place] [facility] [place]: 9/24/18      Controlled substance agreement: [unfilled]    Last Urine Drug Screen: No results found for: CDAUT, No results found for: COMDAT, No results found for: THC13, PCP13, COC13, MAMP13, OPI13, AMP13, BZO13, TCA13, MTD13, BAR13, OXY13, PPX13, BUP13        [email or web address]       checked in past 3 months?  Yes 1/15/18, last filled 12/19/18           Controlled Substance Refill Request for Alprazolam  Problem List Complete:  No     PROVIDER TO CONSIDER COMPLETION OF PROBLEM LIST AND OVERVIEW/CONTROLLED SUBSTANCE AGREEMENT    Last Written Prescription Date:  12/18/18  Last Fill Quantity: 15,   # refills: 0    THE MOST RECENT OFFICE VISIT MUST BE WITHIN THE PAST 3 MONTHS. (AT LEAST ONE FACE TO FACE VISIT MUST OCCUR EVERY 6 MONTHS. ADDITIONAL VISITS CAN BE VIRTUAL.)    Last [place] with [facility] [place]: 9/24/18      Controlled substance agreement: [unfilled]    Last Urine Drug Screen: No results found for: CDAUT, No results found for: COMDAT, No results found for: THC13, PCP13, COC13, MAMP13, OPI13, AMP13, BZO13, TCA13, MTD13, BAR13, OXY13, PPX13, BUP13          [email or web address]       checked in past 3 months?  Yes 1/15/19, last filled 12/19/18                 Controlled Substance Refill Request for Fioricet  Problem List Complete:  No     PROVIDER TO CONSIDER COMPLETION OF PROBLEM LIST AND OVERVIEW/CONTROLLED SUBSTANCE AGREEMENT    Last Written Prescription Date:  12/18/18  Last  Fill Quantity: 20,   # refills: 1    THE MOST RECENT OFFICE VISIT MUST BE WITHIN THE PAST 3 MONTHS. (AT LEAST ONE FACE TO FACE VISIT MUST OCCUR EVERY 6 MONTHS. ADDITIONAL VISITS CAN BE VIRTUAL.)    Last Office Visit with [facility] primary care provider: 9/24/18    Controlled substance agreement: [unfilled]    Last Urine Drug Screen: No results found for: CDAUT, No results found for: COMDAT, No results found for: THC13, PCP13, COC13, MAMP13, OPI13, AMP13, BZO13, TCA13, MTD13, BAR13, OXY13, PPX13, BUP13      [email or web address]       checked in past 3 months?  Yes 1/15/19, last filled 12/18/18         Maddi DALE, RN, CPN

## 2019-01-15 NOTE — TELEPHONE ENCOUNTER
Faxed RX for xanax to [facility] in [place]. Brought other 2 RX's to the  for .Deirdre Munoz MA/TC

## 2019-02-14 ENCOUNTER — MYC REFILL (OUTPATIENT)
Dept: FAMILY MEDICINE | Facility: CLINIC | Age: 29
End: 2019-02-14

## 2019-02-14 DIAGNOSIS — G44.219 EPISODIC TENSION-TYPE HEADACHE, NOT INTRACTABLE: ICD-10-CM

## 2019-02-14 DIAGNOSIS — Z72.0 TOBACCO ABUSE: ICD-10-CM

## 2019-02-14 DIAGNOSIS — F41.1 GENERALIZED ANXIETY DISORDER: ICD-10-CM

## 2019-02-14 DIAGNOSIS — F32.2 MAJOR DEPRESSIVE DISORDER, SINGLE EPISODE, SEVERE WITHOUT PSYCHOTIC FEATURES (H): ICD-10-CM

## 2019-02-14 DIAGNOSIS — F90.8 ATTENTION DEFICIT HYPERACTIVITY DISORDER (ADHD), OTHER TYPE: ICD-10-CM

## 2019-02-14 RX ORDER — SERTRALINE HYDROCHLORIDE 100 MG/1
TABLET, FILM COATED ORAL
Qty: 135 TABLET | Refills: 1 | Status: CANCELLED | OUTPATIENT
Start: 2019-02-14

## 2019-02-14 NOTE — TELEPHONE ENCOUNTER
Controlled Substance Refill Request for Adderall 30mg  Problem List Complete:  No      PROVIDER TO CONSIDER COMPLETION OF PROBLEM LIST AND OVERVIEW/CONTROLLED SUBSTANCE AGREEMENT     Last Written Prescription Date:  1/15/19  Last Fill Quantity: 60,   # refills: 0        Last Office [place] with [facility] [place] care provider: 9/24/18        Controlled substance agreement: [unfilled]     Last Urine Drug Screen: No results found for: CDAUT, No results found for: COMDAT, No results found for: THC13, PCP13, COC13, MAMP13, OPI13, AMP13, BZO13, TCA13, MTD13, BAR13, OXY13, PPX13, BUP13         [email or web address]         checked in past 3 months?  Yes 1/15/18              Controlled Substance Refill Request for Alprazolam  Problem List Complete:  No      PROVIDER TO CONSIDER COMPLETION OF PROBLEM LIST AND OVERVIEW/CONTROLLED SUBSTANCE AGREEMENT     Last Written Prescription Date:  1/15/19  Last Fill Quantity: 15,   # refills: 0          Last Office Visit with [facility] primary care provider: 9/24/18        Controlled substance agreement: [unfilled]     Last Urine Drug Screen: No results found for: CDAUT, No results found for: COMDAT, No results found for: THC13, PCP13, COC13, MAMP13, OPI13, AMP13, BZO13, TCA13, MTD13, BAR13, OXY13, PPX13, BUP13           [email or web address]         checked in past 3 months?  Yes 1/15/19           Controlled Substance Refill Request for Fioricet  Problem List Complete:  No      PROVIDER TO CONSIDER COMPLETION OF PROBLEM LIST AND OVERVIEW/CONTROLLED SUBSTANCE AGREEMENT     Last Written Prescription Date:  1/15/19  Last Fill Quantity: 20,   # refills: 1       Last Office Visit with [facility] primary care provider: 9/24/18     Controlled substance agreement: [unfilled]     Last Urine Drug Screen: No results found for: CDAUT, No results found for: COMDAT, No results found for: THC13, PCP13, COC13, MAMP13, OPI13, AMP13, BZO13, TCA13, MTD13, BAR13, OXY13, PPX13,  BUP13        [email or web address]         checked in past 3 months?  Yes 1/15/19    Shama MAYON, RN

## 2019-02-15 RX ORDER — BUTALBITAL, ACETAMINOPHEN AND CAFFEINE 50; 325; 40 MG/1; MG/1; MG/1
TABLET ORAL
Qty: 20 TABLET | Refills: 0 | Status: SHIPPED | OUTPATIENT
Start: 2019-02-15 | End: 2019-03-11

## 2019-02-15 RX ORDER — ALPRAZOLAM 0.25 MG
0.25 TABLET ORAL 2 TIMES DAILY PRN
Qty: 15 TABLET | Refills: 0 | Status: SHIPPED | OUTPATIENT
Start: 2019-02-15 | End: 2019-03-11

## 2019-02-15 RX ORDER — DEXTROAMPHETAMINE SACCHARATE, AMPHETAMINE ASPARTATE, DEXTROAMPHETAMINE SULFATE AND AMPHETAMINE SULFATE 7.5; 7.5; 7.5; 7.5 MG/1; MG/1; MG/1; MG/1
30 TABLET ORAL 2 TIMES DAILY
Qty: 60 TABLET | Refills: 0 | Status: SHIPPED | OUTPATIENT
Start: 2019-02-15 | End: 2019-03-11

## 2019-03-11 ENCOUNTER — MYC REFILL (OUTPATIENT)
Dept: FAMILY MEDICINE | Facility: CLINIC | Age: 29
End: 2019-03-11

## 2019-03-11 DIAGNOSIS — G44.219 EPISODIC TENSION-TYPE HEADACHE, NOT INTRACTABLE: ICD-10-CM

## 2019-03-11 DIAGNOSIS — F32.2 MAJOR DEPRESSIVE DISORDER, SINGLE EPISODE, SEVERE WITHOUT PSYCHOTIC FEATURES (H): ICD-10-CM

## 2019-03-11 DIAGNOSIS — F90.8 ATTENTION DEFICIT HYPERACTIVITY DISORDER (ADHD), OTHER TYPE: ICD-10-CM

## 2019-03-11 DIAGNOSIS — F41.1 GENERALIZED ANXIETY DISORDER: ICD-10-CM

## 2019-03-12 DIAGNOSIS — F32.2 MAJOR DEPRESSIVE DISORDER, SINGLE EPISODE, SEVERE WITHOUT PSYCHOTIC FEATURES (H): ICD-10-CM

## 2019-03-12 RX ORDER — SERTRALINE HYDROCHLORIDE 100 MG/1
TABLET, FILM COATED ORAL
Qty: 135 TABLET | Refills: 0 | OUTPATIENT
Start: 2019-03-12

## 2019-03-12 RX ORDER — BUTALBITAL, ACETAMINOPHEN AND CAFFEINE 50; 325; 40 MG/1; MG/1; MG/1
TABLET ORAL
Qty: 20 TABLET | Refills: 0 | Status: SHIPPED | OUTPATIENT
Start: 2019-03-12 | End: 2019-04-01

## 2019-03-12 RX ORDER — SERTRALINE HYDROCHLORIDE 100 MG/1
TABLET, FILM COATED ORAL
Qty: 45 TABLET | Refills: 0 | Status: SHIPPED | OUTPATIENT
Start: 2019-03-12 | End: 2019-05-09

## 2019-03-12 RX ORDER — DEXTROAMPHETAMINE SACCHARATE, AMPHETAMINE ASPARTATE, DEXTROAMPHETAMINE SULFATE AND AMPHETAMINE SULFATE 7.5; 7.5; 7.5; 7.5 MG/1; MG/1; MG/1; MG/1
30 TABLET ORAL 2 TIMES DAILY
Qty: 60 TABLET | Refills: 0 | Status: SHIPPED | OUTPATIENT
Start: 2019-03-12 | End: 2019-04-12

## 2019-03-12 RX ORDER — ALPRAZOLAM 0.25 MG
0.25 TABLET ORAL 2 TIMES DAILY PRN
Qty: 15 TABLET | Refills: 0 | Status: SHIPPED | OUTPATIENT
Start: 2019-03-12 | End: 2019-04-01

## 2019-03-12 NOTE — TELEPHONE ENCOUNTER
Controlled Substance Refill Request for Adderall 30mg  Problem List Complete:  No     PROVIDER TO CONSIDER COMPLETION OF PROBLEM LIST AND OVERVIEW/CONTROLLED SUBSTANCE AGREEMENT    Last Written Prescription Date:  2/15/19  Last Fill Quantity: 60,   # refills: 0    Last Office Visit with [facility] [place] care provider: 9/24/18     Controlled substance agreement:   Encounter-Level CSA:    There are no encounter-level csa.     Patient-Level CSA:    There are no patient-level csa.         Last Urine Drug Screen: No results found for: CDAUT, No results found for: COMDAT, No results found for: THC13, PCP13, COC13, MAMP13, OPI13, AMP13, BZO13, TCA13, MTD13, BAR13, OXY13, PPX13, BUP13       checked in past 3 months?  Yes 3/12/19, no concerns           Controlled Substance Refill Request for Alprazolam  Problem List Complete:  No     PROVIDER TO CONSIDER COMPLETION OF PROBLEM LIST AND OVERVIEW/CONTROLLED SUBSTANCE AGREEMENT    Last Written Prescription Date:  2/15/19  Last Fill Quantity: 15,   # refills: 0      Last Office Visit with [facility] primary care provider: 9/24/18    Controlled substance agreement:   Encounter-Level CSA:    There are no encounter-level csa.     Patient-Level CSA:    There are no patient-level csa.         Last Urine Drug Screen: No results found for: CDAUT, No results found for: COMDAT, No results found for: THC13, PCP13, COC13, MAMP13, OPI13, AMP13, BZO13, TCA13, MTD13, BAR13, OXY13, PPX13, BUP13       checked in past 3 months?  Yes 3/12/19, no concerns           Controlled Substance Refill Request for Fioricet  Problem List Complete:  No     PROVIDER TO CONSIDER COMPLETION OF PROBLEM LIST AND OVERVIEW/CONTROLLED SUBSTANCE AGREEMENT    Last Written Prescription Date:  2/15/19  Last Fill Quantity: 20,   # refills: 0      Last Office Visit with [facility] primary care provider: 9/24/18      Controlled substance agreement:   Encounter-Level CSA:    There are no encounter-level csa.     Patient-Level CSA:     There are no patient-level csa.         Last Urine Drug Screen: No results found for: CDAUT, No results found for: COMDAT, No results found for: THC13, PCP13, COC13, MAMP13, OPI13, AMP13, BZO13, TCA13, MTD13, BAR13, OXY13, PPX13, BUP13        checked in past 3 months?  Yes 3/12/19, no concerns       Maddi MAYON, RN, CPN

## 2019-04-01 ENCOUNTER — MYC REFILL (OUTPATIENT)
Dept: FAMILY MEDICINE | Facility: CLINIC | Age: 29
End: 2019-04-01

## 2019-04-01 DIAGNOSIS — F41.1 GENERALIZED ANXIETY DISORDER: ICD-10-CM

## 2019-04-01 DIAGNOSIS — Z72.0 TOBACCO ABUSE: ICD-10-CM

## 2019-04-01 DIAGNOSIS — G44.219 EPISODIC TENSION-TYPE HEADACHE, NOT INTRACTABLE: ICD-10-CM

## 2019-04-03 NOTE — TELEPHONE ENCOUNTER
Controlled Substance Refill Request for Alprazolam  Problem List Complete:  No      Last Written Prescription Date:  3/12/19  Last Fill Quantity: 15,   # refills: 0        [place] [facility] [place] 9/24/18     Controlled substance agreement:   Encounter-Level CSA:    There are no encounter-level csa.      Patient-Level CSA:    There are no patient-level csa.            Last Urine Drug Screen: No results found for: CDAUT, No results found for: COMDAT, No results found for: THC13, PCP13, COC13, MAMP13, OPI13, AMP13, BZO13, TCA13, MTD13, BAR13, OXY13, PPX13, BUP13         checked in past 3 months?  Yes 3/12/19, no concerns             Controlled Substance Refill Request for Fioricet  Problem List Complete:  No        Last Written Prescription Date:  3/12/19  Last Fill Quantity: 20,   # refills: 0        Last [place] with [facility] [place]: 9/24/18        Controlled substance agreement:   Encounter-Level CSA:    There are no encounter-level csa.      Patient-Level CSA:    There are no patient-level csa.            Last Urine Drug Screen: No results found for: CDAUT, No results found for: COMDAT, No results found for: THC13, PCP13, COC13, MAMP13, OPI13, AMP13, BZO13, TCA13, MTD13, BAR13, OXY13, PPX13, BUP13         checked in past 3 months?  Yes 3/12/19, no concerns      Shama DALE, RN

## 2019-04-04 RX ORDER — BUTALBITAL, ACETAMINOPHEN AND CAFFEINE 50; 325; 40 MG/1; MG/1; MG/1
TABLET ORAL
Qty: 20 TABLET | Refills: 0 | Status: SHIPPED | OUTPATIENT
Start: 2019-04-04 | End: 2019-05-09

## 2019-04-04 RX ORDER — ALPRAZOLAM 0.25 MG
0.25 TABLET ORAL 2 TIMES DAILY PRN
Qty: 10 TABLET | Refills: 0 | Status: SHIPPED | OUTPATIENT
Start: 2019-04-04 | End: 2019-04-12

## 2019-04-12 ENCOUNTER — MYC REFILL (OUTPATIENT)
Dept: FAMILY MEDICINE | Facility: CLINIC | Age: 29
End: 2019-04-12

## 2019-04-12 DIAGNOSIS — F90.8 ATTENTION DEFICIT HYPERACTIVITY DISORDER (ADHD), OTHER TYPE: ICD-10-CM

## 2019-04-12 DIAGNOSIS — M54.41 CHRONIC BILATERAL LOW BACK PAIN WITH RIGHT-SIDED SCIATICA: ICD-10-CM

## 2019-04-12 DIAGNOSIS — G89.29 CHRONIC BILATERAL LOW BACK PAIN WITH RIGHT-SIDED SCIATICA: ICD-10-CM

## 2019-04-12 DIAGNOSIS — F41.1 GENERALIZED ANXIETY DISORDER: ICD-10-CM

## 2019-04-12 RX ORDER — DEXTROAMPHETAMINE SACCHARATE, AMPHETAMINE ASPARTATE, DEXTROAMPHETAMINE SULFATE AND AMPHETAMINE SULFATE 7.5; 7.5; 7.5; 7.5 MG/1; MG/1; MG/1; MG/1
30 TABLET ORAL 2 TIMES DAILY
Qty: 60 TABLET | Refills: 0 | Status: SHIPPED | OUTPATIENT
Start: 2019-04-12 | End: 2019-05-09

## 2019-04-12 RX ORDER — CYCLOBENZAPRINE HCL 10 MG
5-10 TABLET ORAL
Qty: 30 TABLET | Refills: 0 | Status: SHIPPED | OUTPATIENT
Start: 2019-04-12 | End: 2019-10-15

## 2019-04-12 RX ORDER — ALPRAZOLAM 0.25 MG
0.25 TABLET ORAL 2 TIMES DAILY PRN
Qty: 1 TABLET | Refills: 0 | COMMUNITY
Start: 2019-04-12 | End: 2019-08-19 | Stop reason: ALTCHOICE

## 2019-04-12 NOTE — TELEPHONE ENCOUNTER
Controlled Substance Refill Request for Alprazolam  Problem List Complete:  No       Last Written Prescription Date:  4/4/19  Last Fill Quantity: 10,   # refills: 0     Controlled Substance Refill Request for Adderall 30mg  Problem List Complete:  No        Last Written Prescription Date:  3/12/19  Last Fill Quantity: 60,   # refills: 0       Last [place] [facility] [place] provider: 9/24/18     Controlled substance agreement:   Encounter-Level CSA:    There are no encounter-level csa.      Patient-Level CSA:    There are no patient-level csa.            Last Urine Drug Screen: No results found for: CDAUT, No results found for: COMDAT, No results found for: THC13, PCP13, COC13, MAMP13, OPI13, AMP13, BZO13, TCA13, MTD13, BAR13, OXY13, PPX13, BUP13         checked in past 3 months?  Yes 3/12/19, no concerns         Shama MAYON, RN

## 2019-05-08 ENCOUNTER — TELEPHONE (OUTPATIENT)
Dept: FAMILY MEDICINE | Facility: CLINIC | Age: 29
End: 2019-05-08

## 2019-05-08 ENCOUNTER — NURSE TRIAGE (OUTPATIENT)
Dept: NURSING | Facility: CLINIC | Age: 29
End: 2019-05-08

## 2019-05-08 NOTE — TELEPHONE ENCOUNTER
Clinic Action Needed:Yes, please call patient   Reason for Call:Patient calling reporting he will be without health insurance with in the next couple of days. Stating he was let go yesterday from job. Patient is requesting urgent appointment before insurance expires to obtain refills of Xanax and Adderall.     Emmy Jay RN  [facility]

## 2019-05-08 NOTE — TELEPHONE ENCOUNTER
Clinic Action Needed:Yes, please call patient   Reason for Call:Patient calling reporting he will be without health insurance with in the next couple of days. Stating he was let go yesterday. Patient is requesting urgent appointment before insurance expires to obtain refills of Xanax and Adderall.     Emmy Jay RN  [facility]      .

## 2019-05-08 NOTE — TELEPHONE ENCOUNTER
Pt states he won't have insurance after Friday.  Appointment made for tomorrow.  Shama MAYON, RN

## 2019-05-09 ENCOUNTER — OFFICE VISIT (OUTPATIENT)
Dept: FAMILY MEDICINE | Facility: CLINIC | Age: 29
End: 2019-05-09
Payer: MEDICAID

## 2019-05-09 VITALS
HEIGHT: 73 IN | DIASTOLIC BLOOD PRESSURE: 86 MMHG | SYSTOLIC BLOOD PRESSURE: 141 MMHG | HEART RATE: 137 BPM | OXYGEN SATURATION: 100 % | WEIGHT: 210 LBS | BODY MASS INDEX: 27.83 KG/M2 | RESPIRATION RATE: 20 BRPM | TEMPERATURE: 97.7 F

## 2019-05-09 DIAGNOSIS — F90.8 ATTENTION DEFICIT HYPERACTIVITY DISORDER (ADHD), OTHER TYPE: ICD-10-CM

## 2019-05-09 DIAGNOSIS — G89.29 CHRONIC BILATERAL LOW BACK PAIN WITH RIGHT-SIDED SCIATICA: ICD-10-CM

## 2019-05-09 DIAGNOSIS — Z72.0 TOBACCO ABUSE: ICD-10-CM

## 2019-05-09 DIAGNOSIS — M54.41 CHRONIC BILATERAL LOW BACK PAIN WITH RIGHT-SIDED SCIATICA: ICD-10-CM

## 2019-05-09 DIAGNOSIS — G44.219 EPISODIC TENSION-TYPE HEADACHE, NOT INTRACTABLE: ICD-10-CM

## 2019-05-09 DIAGNOSIS — F32.2 MAJOR DEPRESSIVE DISORDER, SINGLE EPISODE, SEVERE WITHOUT PSYCHOTIC FEATURES (H): ICD-10-CM

## 2019-05-09 DIAGNOSIS — L72.3 SEBACEOUS CYST: Primary | ICD-10-CM

## 2019-05-09 DIAGNOSIS — F41.1 GENERALIZED ANXIETY DISORDER: ICD-10-CM

## 2019-05-09 PROCEDURE — 99214 OFFICE O/P EST MOD 30 MIN: CPT | Performed by: FAMILY MEDICINE

## 2019-05-09 RX ORDER — DOXYCYCLINE HYCLATE 100 MG
100 TABLET ORAL 2 TIMES DAILY
Qty: 14 TABLET | Refills: 1 | Status: SHIPPED | OUTPATIENT
Start: 2019-05-09 | End: 2019-08-19

## 2019-05-09 RX ORDER — DEXTROAMPHETAMINE SACCHARATE, AMPHETAMINE ASPARTATE, DEXTROAMPHETAMINE SULFATE AND AMPHETAMINE SULFATE 7.5; 7.5; 7.5; 7.5 MG/1; MG/1; MG/1; MG/1
30 TABLET ORAL 2 TIMES DAILY
Qty: 60 TABLET | Refills: 0 | Status: SHIPPED | OUTPATIENT
Start: 2019-06-08 | End: 2019-05-09

## 2019-05-09 RX ORDER — CLONAZEPAM 0.5 MG/1
0.5 TABLET ORAL 2 TIMES DAILY PRN
Qty: 40 TABLET | Refills: 2 | Status: SHIPPED | OUTPATIENT
Start: 2019-05-09 | End: 2019-07-29

## 2019-05-09 RX ORDER — SERTRALINE HYDROCHLORIDE 100 MG/1
TABLET, FILM COATED ORAL
Qty: 135 TABLET | Refills: 1 | Status: SHIPPED | OUTPATIENT
Start: 2019-05-09 | End: 2019-08-30

## 2019-05-09 RX ORDER — BUTALBITAL, ACETAMINOPHEN AND CAFFEINE 50; 325; 40 MG/1; MG/1; MG/1
TABLET ORAL
Qty: 40 TABLET | Refills: 0 | Status: SHIPPED | OUTPATIENT
Start: 2019-05-09 | End: 2019-06-05

## 2019-05-09 RX ORDER — GABAPENTIN 300 MG/1
300 CAPSULE ORAL
Qty: 90 CAPSULE | Refills: 1 | Status: SHIPPED | OUTPATIENT
Start: 2019-05-09 | End: 2019-10-15

## 2019-05-09 RX ORDER — DEXTROAMPHETAMINE SACCHARATE, AMPHETAMINE ASPARTATE, DEXTROAMPHETAMINE SULFATE AND AMPHETAMINE SULFATE 7.5; 7.5; 7.5; 7.5 MG/1; MG/1; MG/1; MG/1
30 TABLET ORAL 2 TIMES DAILY
Qty: 60 TABLET | Refills: 0 | Status: SHIPPED | OUTPATIENT
Start: 2019-05-09 | End: 2019-05-09

## 2019-05-09 RX ORDER — DEXTROAMPHETAMINE SACCHARATE, AMPHETAMINE ASPARTATE, DEXTROAMPHETAMINE SULFATE AND AMPHETAMINE SULFATE 7.5; 7.5; 7.5; 7.5 MG/1; MG/1; MG/1; MG/1
30 TABLET ORAL 2 TIMES DAILY
Qty: 60 TABLET | Refills: 0 | Status: SHIPPED | OUTPATIENT
Start: 2019-07-08 | End: 2019-07-29

## 2019-05-09 ASSESSMENT — MIFFLIN-ST. JEOR: SCORE: 1971.43

## 2019-05-09 NOTE — PROGRESS NOTES
"SUBJECTIVE:  Tejas Villalba III, a 28 year old male scheduled an appointment to discuss the following issues:  Follow-up adhd, anxiety, migraines, tobacco abuse and chronic pain issues.  Lost job. Dating going ok. 4 son - seeing regularly.   Family doing ok. Emotionally doing ok.   Migraines stable. Back to smoking with job loss- gum helpful.   clonopam helpful in past. No SUICIAL IDEATION OR HOMOCIDAL IDEATION OR BERNADETTE.  Exercise - active.   sebacous cyst on scalp.       Medical, social, surgical, and family histories reviewed.    ROS:  All other ROS negative    OBJECTIVE:  /86   Pulse 137   Temp 97.7  F (36.5  C) (Oral)   Resp 20   Ht 1.854 m (6' 1\")   Wt 95.3 kg (210 lb)   SpO2 100%   BMI 27.71 kg/m    EXAM:  GENERAL APPEARANCE: healthy, alert and no distress  NECK: no adenopathy, no asymmetry, masses, or scars and thyroid normal to palpation  RESP: lungs clear to auscultation - no rales, rhonchi or wheezes  CV: regular rates and rhythm, normal S1 S2, no S3 or S4 and no murmur, click or rub -  ABDOMEN:  soft, nontender, no HSM or masses and bowel sounds normal  MS: extremities normal- no gross deformities noted, no evidence of inflammation in joints, FROM in all extremities.  SKIN: marble sized cyst mildly tender scalp parietal mid/top of scalp  PSYCH: mentation appears normal and affect normal/bright, mildly anxious    ASSESSMENT / PLAN:  (L72.3) Sebaceous cyst  (primary encounter diagnosis)  Comment: large/reoccurent  Plan: doxycycline hyclate (VIBRA-TABS) 100 MG tablet,        GENERAL SURG ADULT REFERRAL         Reveiwed risks and side effects of medication  Follow-up surgery.     (F41.1) Generalized anxiety disorder  Comment: needs help  Plan: clonazePAM (KLONOPIN) 0.5 MG tablet        Stop xanax. Klonopin helpful and more long acting. Avoid with ALCOHOL, narcotics or fiorcet. Recheck in 6 months  Reveiwed risks and side effects of medication      (F90.8) Attention deficit hyperactivity " disorder (ADHD), other type  Comment: stable  Plan: amphetamine-dextroamphetamine (ADDERALL) 30 MG         tablet, DISCONTINUED:         amphetamine-dextroamphetamine (ADDERALL) 30 MG         tablet, DISCONTINUED:         amphetamine-dextroamphetamine (ADDERALL) 30 MG         tablet        Prn     (G44.219) Episodic tension-type headache, not intractable  Comment: stable  Plan: butalbital-acetaminophen-caffeine         (FIORICET/ESGIC) -40 MG tablet        Prn. Avoid with benzos. Back to neurology if worse.    (M54.41,  G89.29) Chronic bilateral low back pain with right-sided sciatica  Plan: gabapentin (NEURONTIN) 300 MG capsule        Reveiwed risks and side effects of medication  exercise    (Z72.0) Tobacco abuse  Plan: nicotine (NICORETTE) 2 MG gum        Continue cut down    (F32.2) Major depressive disorder, single episode, severe without psychotic features (H)  Comment: stable  Plan: sertraline (ZOLOFT) 100 MG tablet        Exercise. Good support system family. Will look for new job. Dating ok. If SUICIAL IDEATION OR HOMOCIDAL IDEATION OR BERNADETTE TO ER. Recheck in 6 months  Sooner if worse. If SUICIAL IDEATION OR HOMOCIDAL IDEATION OR BERNADETTE TO ER     Karan Waters MD

## 2019-05-09 NOTE — NURSING NOTE
"Chief Complaint   Patient presents with     Medication Therapy Management       Initial /86   Pulse 137   Temp 97.7  F (36.5  C) (Oral)   Resp 20   Ht 1.854 m (6' 1\")   Wt 95.3 kg (210 lb)   SpO2 100%   BMI 27.71 kg/m   Estimated body mass index is 27.71 kg/m  as calculated from the following:    Height as of this encounter: 1.854 m (6' 1\").    Weight as of this encounter: 95.3 kg (210 lb).    Shama Stacy, CMA    "

## 2019-06-05 ENCOUNTER — MYC REFILL (OUTPATIENT)
Dept: FAMILY MEDICINE | Facility: CLINIC | Age: 29
End: 2019-06-05

## 2019-06-05 DIAGNOSIS — G44.219 EPISODIC TENSION-TYPE HEADACHE, NOT INTRACTABLE: ICD-10-CM

## 2019-06-06 ENCOUNTER — TELEPHONE (OUTPATIENT)
Dept: FAMILY MEDICINE | Facility: CLINIC | Age: 29
End: 2019-06-06

## 2019-06-06 RX ORDER — BUTALBITAL, ACETAMINOPHEN AND CAFFEINE 50; 325; 40 MG/1; MG/1; MG/1
TABLET ORAL
Qty: 40 TABLET | Refills: 0 | Status: SHIPPED | OUTPATIENT
Start: 2019-06-06 | End: 2019-06-26

## 2019-06-26 ENCOUNTER — MYC MEDICAL ADVICE (OUTPATIENT)
Dept: FAMILY MEDICINE | Facility: CLINIC | Age: 29
End: 2019-06-26

## 2019-06-26 ENCOUNTER — MYC REFILL (OUTPATIENT)
Dept: FAMILY MEDICINE | Facility: CLINIC | Age: 29
End: 2019-06-26

## 2019-06-26 DIAGNOSIS — G44.219 EPISODIC TENSION-TYPE HEADACHE, NOT INTRACTABLE: ICD-10-CM

## 2019-06-27 RX ORDER — BUTALBITAL, ACETAMINOPHEN AND CAFFEINE 50; 325; 40 MG/1; MG/1; MG/1
TABLET ORAL
Qty: 40 TABLET | Refills: 0 | Status: SHIPPED | OUTPATIENT
Start: 2019-06-27 | End: 2019-07-16

## 2019-06-27 NOTE — TELEPHONE ENCOUNTER
Controlled Substance Refill Request for Fioricet  Problem List Complete:  No      Last Written Prescription Date:  6/6/19  Last Fill Quantity: 40,   # refills: 0      Last Office Visit with [facility] primary care provider: 9/24/18        Controlled substance agreement:   Encounter-Level CSA:    There are no encounter-level csa.      Patient-Level CSA:    There are no patient-level csa.            Last Urine Drug Screen: No results found for: CDAUT, No results found for: COMDAT, No results found for: THC13, PCP13, COC13, MAMP13, OPI13, AMP13, BZO13, TCA13, MTD13, BAR13, OXY13, PPX13, BUP13      checked in past 3 months?  Yes 6/27/19, no concerns   Shama MAYON, RN

## 2019-07-16 DIAGNOSIS — G44.219 EPISODIC TENSION-TYPE HEADACHE, NOT INTRACTABLE: ICD-10-CM

## 2019-07-17 NOTE — TELEPHONE ENCOUNTER
? Early refills.    Fioricet refills  6/6/19, 6/27/19 for a total of 80.        Controlled Substance Refill Request for Fioricet  Problem List Complete:  No      Last Written Prescription Date:  6/6/19  Last Fill Quantity: 40,   # refills: 0      [place] [facility] [place]: 9/24/18        Controlled substance agreement:   Encounter-Level CSA:    There are no encounter-level csa.      Patient-Level CSA:    There are no patient-level csa.            Last Urine Drug Screen: No results found for: CDAUT, No results found for: COMDAT, No results found for: THC13, PCP13, COC13, MAMP13, OPI13, AMP13, BZO13, TCA13, MTD13, BAR13, OXY13, PPX13, BUP13      checked in past 3 months?  Yes 7/17/19.  Jil Castañeda RN

## 2019-07-18 RX ORDER — BUTALBITAL, ACETAMINOPHEN AND CAFFEINE 50; 325; 40 MG/1; MG/1; MG/1
TABLET ORAL
Qty: 40 TABLET | Refills: 0 | Status: SHIPPED | OUTPATIENT
Start: 2019-07-18 | End: 2019-08-30

## 2019-07-25 ENCOUNTER — OFFICE VISIT (OUTPATIENT)
Dept: SURGERY | Facility: CLINIC | Age: 29
End: 2019-07-25
Payer: COMMERCIAL

## 2019-07-25 VITALS
HEART RATE: 78 BPM | BODY MASS INDEX: 27.96 KG/M2 | SYSTOLIC BLOOD PRESSURE: 136 MMHG | HEIGHT: 73 IN | DIASTOLIC BLOOD PRESSURE: 86 MMHG | WEIGHT: 211 LBS

## 2019-07-25 DIAGNOSIS — L72.11 PILAR CYST: Primary | ICD-10-CM

## 2019-07-25 PROCEDURE — 99243 OFF/OP CNSLTJ NEW/EST LOW 30: CPT | Performed by: SURGERY

## 2019-07-25 ASSESSMENT — MIFFLIN-ST. JEOR: SCORE: 1975.97

## 2019-07-25 NOTE — PROGRESS NOTES
I was asked to see Tejas Villalba III regarding pilar cyst by Karan Waters MD    CC: Mass    HPI:  Patient is a 28 year old male with complaints of pain when it gets bumped. The patient noticed the symptoms about 14-15 yrs ago. The mass has grown recently. Only has a sharp pain when he bumps it, the cyst doesn't hurt when not irritated. He denies having had an infection of the cyst. He has     Patient does have a personal history of skin problems, with calcium   Patient does not have a family history of skin cancer    Review Of Systems    General: negative for fever or chills  Skin: negative except mass noted above  Ears/Nose/Throat: negative for, epistaxis, bleeding gums  Respiratory: No shortness of breath, dyspnea on exertion, cough, or hemoptysis  Cardiovascular: negative for and chest pain  Gastrointestinal: negative for, nausea, vomiting and abdominal pain  Genitourinary: negative for and frequency  Neurologic: negative for and local weakness  Hematologic/Lymphatic/Immunologic: negative for, bleeding disorder and frequent infections  Endocrine: negative for, diabetes, polydipsia and polyuria    Past Medical History:   Diagnosis Date     ADHD      HTN        Past Surgical History:   Procedure Laterality Date     PE TUBES         Social History     Socioeconomic History     Marital status: Single     Spouse name: Not on file     Number of children: Not on file     Years of education: Not on file     Highest education level: Not on file   Occupational History     Not on file   Social Needs     Financial resource strain: Not on file     Food insecurity:     Worry: Not on file     Inability: Not on file     Transportation needs:     Medical: Not on file     Non-medical: Not on file   Tobacco Use     Smoking status: Former Smoker     Packs/day: 0.50     Last attempt to quit: 2010     Years since quittin.9     Smokeless tobacco: Never Used   Substance and Sexual Activity     Alcohol use: Yes      Drug use: No     Sexual activity: Yes     Partners: Female   Lifestyle     Physical activity:     Days per week: Not on file     Minutes per session: Not on file     Stress: Not on file   Relationships     Social connections:     Talks on phone: Not on file     Gets together: Not on file     Attends [employer] service: Not on file     Active member of club or organization: Not on file     Attends meetings of clubs or organizations: Not on file     Relationship status: Not on file     Intimate partner violence:     Fear of current or ex partner: Not on file     Emotionally abused: Not on file     Physically abused: Not on file     Forced sexual activity: Not on file   Other Topics Concern     Parent/sibling w/ CABG, MI or angioplasty before 65F 55M? Not Asked   Social History Narrative     Not on file       Current Outpatient Medications   Medication Sig Dispense Refill     ALPRAZolam (XANAX) 0.25 MG tablet Take 1 tablet (0.25 mg) by mouth 2 times daily as needed for anxiety or sleep Need to be seen for more 1 tablet 0     amphetamine-dextroamphetamine (ADDERALL) 30 MG tablet Take 1 tablet (30 mg) by mouth 2 times daily 60 tablet 0     butalbital-acetaminophen-caffeine (FIORICET/ESGIC) -40 MG tablet TAKE 1 TABLET BY MOUTH EVERY 6 HOURS AS NEEDED. AVOID WITH KLONOPIN/CLONAZEPAM 40 tablet 0     clonazePAM (KLONOPIN) 0.5 MG tablet Take 1 tablet (0.5 mg) by mouth 2 times daily as needed for anxiety 40 tablet 2     cyclobenzaprine (FLEXERIL) 10 MG tablet Take 0.5-1 tablets (5-10 mg) by mouth nightly as needed for muscle spasms 30 tablet 0     gabapentin (NEURONTIN) 300 MG capsule Take 1 capsule (300 mg) by mouth nightly as needed Sleep/back pain 90 capsule 1     IBUPROFEN PO Take 600 mg by mouth 2 times daily As needed       nicotine (NICORETTE) 2 MG gum PLACE 1 EACH INSIDE CHEEK AS NEEDED FOR SMOKING CESSATION. 30 each 3     sertraline (ZOLOFT) 100 MG tablet 1.5 tabs =150mg For depression/anxiety 135 tablet 1  "      Physical exam:   /86   Pulse 78   Ht 1.854 m (6' 1\")   Wt 95.7 kg (211 lb)   BMI 27.84 kg/m      Exam:  Constitutional: healthy, alert and no distress  Eyes: Pupils round and equal, no icterus   ENT: Mucous membranes moist  Respiratory:  Non-labored respiration  Musculoskeletal: No gross deformity  Neurologic: No gross focal deficits  Psychiatric: mentation appears normal and affect normal/bright  Hematologic/Lymphatic/Immunologic: No bruising noted  Skin: No lesions, rashes, erythema or jaundice noted      Skin:  Lesion at scalp and is consistent with a pilar cyst    Assessment: Pilar  This is to large for the office and will need to be done in the operating room.      The risks benefits and alternatives of removing the mass (either in the office or the surgery center) were discussed with the patient including but not limited to pain, bleeding, infection, risks of general anesthesia (i.e. MI, CVA, PE, and death), and cosmetic deformity. Additionally we discussed the risk of recurrence    Luke Hugo"

## 2019-07-25 NOTE — LETTER
7/25/2019         RE: Tejas Villalba III  [address] [facility] [address]        Dear Colleague,    Thank you for referring your patient, Tejas Villalba III, to the [facility]. Please see a copy of my visit note below.    I was asked to see Tejas Villalba III regarding pilar cyst by Karan Waetrs MD    CC: Mass    HPI:  Patient is a 28 year old male with complaints of pain when it gets bumped. The patient noticed the symptoms about 14-15 yrs ago. The mass has grown recently. Only has a sharp pain when he bumps it, the cyst doesn't hurt when not irritated. He denies having had an infection of the cyst. He has     Patient does have a personal history of skin problems, with calcium   Patient does not have a family history of skin cancer    Review Of Systems    General: negative for fever or chills  Skin: negative except mass noted above  Ears/Nose/Throat: negative for, epistaxis, bleeding gums  Respiratory: No shortness of breath, dyspnea on exertion, cough, or hemoptysis  Cardiovascular: negative for and chest pain  Gastrointestinal: negative for, nausea, vomiting and abdominal pain  Genitourinary: negative for and frequency  Neurologic: negative for and local weakness  Hematologic/Lymphatic/Immunologic: negative for, bleeding disorder and frequent infections  Endocrine: negative for, diabetes, polydipsia and polyuria    Past Medical History:   Diagnosis Date     ADHD      HTN        Past Surgical History:   Procedure Laterality Date     PE TUBES         Social History     Socioeconomic History     Marital status: Single     Spouse name: Not on file     Number of children: Not on file     Years of education: Not on file     Highest education level: Not on file   Occupational History     Not on file   Social Needs     Financial resource strain: Not on file     Food insecurity:     Worry: Not on file     Inability: Not on file     Transportation needs:     Medical: Not  on file     Non-medical: Not on file   Tobacco Use     Smoking status: Former Smoker     Packs/day: 0.50     Last attempt to quit: 2010     Years since quittin.9     Smokeless tobacco: Never Used   Substance and Sexual Activity     Alcohol use: Yes     Drug use: No     Sexual activity: Yes     Partners: Female   Lifestyle     Physical activity:     Days per week: Not on file     Minutes per session: Not on file     Stress: Not on file   Relationships     Social connections:     Talks on phone: Not on file     Gets together: Not on file     Attends Yarsani service: Not on file     Active member of club or organization: Not on file     Attends meetings of clubs or organizations: Not on file     Relationship status: Not on file     Intimate partner violence:     Fear of current or ex partner: Not on file     Emotionally abused: Not on file     Physically abused: Not on file     Forced sexual activity: Not on file   Other Topics Concern     Parent/sibling w/ CABG, MI or angioplasty before 65F 55M? Not Asked   Social History Narrative     Not on file       Current Outpatient Medications   Medication Sig Dispense Refill     ALPRAZolam (XANAX) 0.25 MG tablet Take 1 tablet (0.25 mg) by mouth 2 times daily as needed for anxiety or sleep Need to be seen for more 1 tablet 0     amphetamine-dextroamphetamine (ADDERALL) 30 MG tablet Take 1 tablet (30 mg) by mouth 2 times daily 60 tablet 0     butalbital-acetaminophen-caffeine (FIORICET/ESGIC) -40 MG tablet TAKE 1 TABLET BY MOUTH EVERY 6 HOURS AS NEEDED. AVOID WITH KLONOPIN/CLONAZEPAM 40 tablet 0     clonazePAM (KLONOPIN) 0.5 MG tablet Take 1 tablet (0.5 mg) by mouth 2 times daily as needed for anxiety 40 tablet 2     cyclobenzaprine (FLEXERIL) 10 MG tablet Take 0.5-1 tablets (5-10 mg) by mouth nightly as needed for muscle spasms 30 tablet 0     gabapentin (NEURONTIN) 300 MG capsule Take 1 capsule (300 mg) by mouth nightly as needed Sleep/back pain 90 capsule 1  "    IBUPROFEN PO Take 600 mg by mouth 2 times daily As needed       nicotine (NICORETTE) 2 MG gum PLACE 1 EACH INSIDE CHEEK AS NEEDED FOR SMOKING CESSATION. 30 each 3     sertraline (ZOLOFT) 100 MG tablet 1.5 tabs =150mg For depression/anxiety 135 tablet 1       Physical exam:   /86   Pulse 78   Ht 1.854 m (6' 1\")   Wt 95.7 kg (211 lb)   BMI 27.84 kg/m       Exam:  Constitutional: healthy, alert and no distress  Eyes: Pupils round and equal, no icterus   ENT: Mucous membranes moist  Respiratory:  Non-labored respiration  Musculoskeletal: No gross deformity  Neurologic: No gross focal deficits  Psychiatric: mentation appears normal and affect normal/bright  Hematologic/Lymphatic/Immunologic: No bruising noted  Skin: No lesions, rashes, erythema or jaundice noted      Skin:  Lesion at scalp and is consistent with a pilar cyst    Assessment: Pilar  This is to large for the office and will need to be done in the operating room.      The risks benefits and alternatives of removing the mass (either in the office or the surgery center) were discussed with the patient including but not limited to pain, bleeding, infection, risks of general anesthesia (i.e. MI, CVA, PE, and death), and cosmetic deformity. Additionally we discussed the risk of recurrence    Luke Hugo      Again, thank you for allowing me to participate in the care of your patient.        Sincerely,        Luke Hugo, DO    "

## 2019-07-29 ENCOUNTER — MYC REFILL (OUTPATIENT)
Dept: FAMILY MEDICINE | Facility: CLINIC | Age: 29
End: 2019-07-29

## 2019-07-29 DIAGNOSIS — F90.8 ATTENTION DEFICIT HYPERACTIVITY DISORDER (ADHD), OTHER TYPE: ICD-10-CM

## 2019-07-29 DIAGNOSIS — F41.1 GENERALIZED ANXIETY DISORDER: ICD-10-CM

## 2019-07-30 ENCOUNTER — TELEPHONE (OUTPATIENT)
Dept: SURGERY | Facility: CLINIC | Age: 29
End: 2019-07-30

## 2019-07-30 RX ORDER — CLONAZEPAM 0.5 MG/1
0.5 TABLET ORAL 2 TIMES DAILY PRN
Qty: 40 TABLET | Refills: 2 | Status: SHIPPED | OUTPATIENT
Start: 2019-07-30 | End: 2019-08-30

## 2019-07-30 RX ORDER — DEXTROAMPHETAMINE SACCHARATE, AMPHETAMINE ASPARTATE, DEXTROAMPHETAMINE SULFATE AND AMPHETAMINE SULFATE 7.5; 7.5; 7.5; 7.5 MG/1; MG/1; MG/1; MG/1
30 TABLET ORAL 2 TIMES DAILY
Qty: 60 TABLET | Refills: 0 | Status: SHIPPED | OUTPATIENT
Start: 2019-08-07 | End: 2019-08-30

## 2019-07-30 NOTE — TELEPHONE ENCOUNTER
Controlled Substance Refill Request for Clonazepam  Problem List Complete:  No     PROVIDER TO CONSIDER COMPLETION OF PROBLEM LIST AND OVERVIEW/CONTROLLED SUBSTANCE AGREEMENT    Last Written Prescription Date:  5/9/19  Last Fill Quantity: 40,   # refills: 2    Last Office Visit with [facility] primary care provider: 5/9/19      Controlled substance agreement:   Encounter-Level CSA:    There are no encounter-level csa.     Patient-Level CSA:    There are no patient-level csa.         Last Urine Drug Screen: No results found for: CDAUT, No results found for: COMDAT, No results found for: THC13, PCP13, COC13, MAMP13, OPI13, AMP13, BZO13, TCA13, MTD13, BAR13, OXY13, PPX13, BUP13       [email or web address]       checked in past 3 months?  Yes 7/30/19, last filled 7/2               Controlled Substance Refill Request for Adderall 30mg  Problem List Complete:  No     PROVIDER TO CONSIDER COMPLETION OF PROBLEM LIST AND OVERVIEW/CONTROLLED SUBSTANCE AGREEMENT    Last Written Prescription Date:  7/8/19  Last Fill Quantity: 60,   # refills: 0    Last Office Visit with [facility] primary care provider: 5/9/19       Controlled substance agreement:   Encounter-Level CSA:    There are no encounter-level csa.     Patient-Level CSA:    There are no patient-level csa.         Last Urine Drug Screen: No results found for: CDAUT, No results found for: COMDAT, No results found for: THC13, PCP13, COC13, MAMP13, OPI13, AMP13, BZO13, TCA13, MTD13, BAR13, OXY13, PPX13, BUP13       [email or web address]       checked in past 3 months?  Yes 7/30/19, last filled on 7/2/19       Maddi MAYON, RN, CPN

## 2019-08-06 ENCOUNTER — HOSPITAL ENCOUNTER (OUTPATIENT)
Facility: AMBULATORY SURGERY CENTER | Age: 29
End: 2019-08-06
Attending: SURGERY | Admitting: SURGERY
Payer: COMMERCIAL

## 2019-08-06 NOTE — TELEPHONE ENCOUNTER
Type of surgery: Excision of Pilar Cyst  CPT code 66270  Pilar cyst [L72.11]  - Primary       Location of surgery: [facility]  Date and time of surgery: 08/27/2019 / netod   Surgeon: Bethel   Pre-Op Appt Date: 08/19/2019  Post-Op Appt Date: 09/10/2019   Packet sent out: Yes  Pre-cert/Authorization completed:  No prior auth required per [facility] list.   Date: 08/06/2019    Insurance valid. 08/06/2019

## 2019-08-19 ENCOUNTER — OFFICE VISIT (OUTPATIENT)
Dept: FAMILY MEDICINE | Facility: CLINIC | Age: 29
End: 2019-08-19
Payer: COMMERCIAL

## 2019-08-19 VITALS
HEART RATE: 110 BPM | OXYGEN SATURATION: 96 % | BODY MASS INDEX: 27.18 KG/M2 | TEMPERATURE: 99.3 F | DIASTOLIC BLOOD PRESSURE: 77 MMHG | SYSTOLIC BLOOD PRESSURE: 131 MMHG | WEIGHT: 206 LBS

## 2019-08-19 DIAGNOSIS — Z01.818 PREOP GENERAL PHYSICAL EXAM: Primary | ICD-10-CM

## 2019-08-19 DIAGNOSIS — L72.11 PILAR CYST: ICD-10-CM

## 2019-08-19 PROCEDURE — 99214 OFFICE O/P EST MOD 30 MIN: CPT | Performed by: PHYSICIAN ASSISTANT

## 2019-08-19 ASSESSMENT — PATIENT HEALTH QUESTIONNAIRE - PHQ9
5. POOR APPETITE OR OVEREATING: NOT AT ALL
SUM OF ALL RESPONSES TO PHQ QUESTIONS 1-9: 1

## 2019-08-19 ASSESSMENT — ANXIETY QUESTIONNAIRES
6. BECOMING EASILY ANNOYED OR IRRITABLE: NOT AT ALL
7. FEELING AFRAID AS IF SOMETHING AWFUL MIGHT HAPPEN: NOT AT ALL
2. NOT BEING ABLE TO STOP OR CONTROL WORRYING: SEVERAL DAYS
1. FEELING NERVOUS, ANXIOUS, OR ON EDGE: NOT AT ALL
5. BEING SO RESTLESS THAT IT IS HARD TO SIT STILL: NOT AT ALL
3. WORRYING TOO MUCH ABOUT DIFFERENT THINGS: SEVERAL DAYS
IF YOU CHECKED OFF ANY PROBLEMS ON THIS QUESTIONNAIRE, HOW DIFFICULT HAVE THESE PROBLEMS MADE IT FOR YOU TO DO YOUR WORK, TAKE CARE OF THINGS AT HOME, OR GET ALONG WITH OTHER PEOPLE: SOMEWHAT DIFFICULT
GAD7 TOTAL SCORE: 2

## 2019-08-19 ASSESSMENT — PAIN SCALES - GENERAL: PAINLEVEL: NO PAIN (0)

## 2019-08-19 NOTE — NURSING NOTE
"Chief Complaint   Patient presents with     Pre-Op Exam       Initial /77   Pulse 110   Temp 99.3  F (37.4  C) (Oral)   Wt 93.4 kg (206 lb)   SpO2 96%   BMI 27.18 kg/m   Estimated body mass index is 27.18 kg/m  as calculated from the following:    Height as of 7/25/19: 1.854 m (6' 1\").    Weight as of this encounter: 93.4 kg (206 lb).  Medication Reconciliation: complete    ANNI Olmos MA    "

## 2019-08-19 NOTE — PROGRESS NOTES
[facility]  [address] [facility] [id number] [phone]  Dept: [phone]    PRE-OP EVALUATION:  Today's date: 2019    Tejas Villalba III (: 1990) presents for pre-operative evaluation assessment as requested by Dr. Leslie.  He requires evaluation and anesthesia risk assessment prior to undergoing surgery/procedure for treatment of pilar cyst .    Fax number for surgical facility:   Primary Physician: Karan Waters  Type of Anesthesia Anticipated: to be determined    Patient has a Health Care Directive or Living Will:  NO    Preop Questions 2019   Who is doing your surgery? dr leslie   What are you having done? cyst removal   Date of Surgery/Procedure:    Facility or Hospital where procedure/surgery will be performed: [facility]   1.  Do you have a history of Heart attack, stroke, stent, coronary bypass surgery, or other heart surgery? No   2.  Do you ever have any pain or discomfort in your chest? No   3.  Do you have a history of  Heart Failure? No   4.   Are you troubled by shortness of breath when:  walking on a level surface, or up a slight hill, or at night? No   5.  Do you currently have a cold, bronchitis or other respiratory infection? No   6.  Do you have a cough, shortness of breath, or wheezing? No   7.  Do you sometimes get pains in the calves of your legs when you walk? No   8. Do you or anyone in your family have previous history of blood clots? UNKNOWN -    9.  Do you or does anyone in your family have a serious bleeding problem such as prolonged bleeding following surgeries or cuts? UNKNOWN -    10. Have you ever had problems with anemia or been told to take iron pills? No   11. Have you had any abnormal blood loss such as black, tarry or bloody stools? No   12. Have you ever had a blood transfusion? No   13. Have you or any of your relatives ever had problems with anesthesia? UNKNOWN - mother got drowsy for months   14. Do you  have sleep apnea, excessive snoring or daytime drowsiness? No   15. Do you have any prosthetic heart valves? No   16. Do you have prosthetic joints? No         HPI:     HPI related to upcoming procedure: Pilar cyst on his scalp that will be surgically removed.           MEDICAL HISTORY:     Patient Active Problem List    Diagnosis Date Noted     Attention deficit hyperactivity disorder (ADHD), other type 06/30/2017     Priority: Medium     Chronic bilateral low back pain with right-sided sciatica 07/07/2016     Priority: Medium     Intractable chronic migraine without aura and without status migrainosus 04/19/2016     Priority: Medium     Major depressive disorder, recurrent episode, mild (H) 04/19/2016     Priority: Medium     Mid back pain 09/24/2015     Priority: Medium     Intermittent explosive disorder 11/06/2013     Priority: Medium     Generalized anxiety disorder 12/11/2012     Priority: Medium     Diagnosis updated by automated process. Provider to review and confirm.       BMI 30.0-30.9,adult 07/27/2011     Priority: Medium     ADD (attention deficit disorder) 07/27/2011     Priority: Medium     CARDIOVASCULAR SCREENING; LDL GOAL LESS THAN 160 10/31/2010     Priority: Medium      Past Medical History:   Diagnosis Date     ADHD      HTN      Past Surgical History:   Procedure Laterality Date     PE TUBES       Current Outpatient Medications   Medication Sig Dispense Refill     amphetamine-dextroamphetamine (ADDERALL) 30 MG tablet Take 1 tablet (30 mg) by mouth 2 times daily 60 tablet 0     butalbital-acetaminophen-caffeine (FIORICET/ESGIC) -40 MG tablet TAKE 1 TABLET BY MOUTH EVERY 6 HOURS AS NEEDED. AVOID WITH KLONOPIN/CLONAZEPAM 40 tablet 0     clonazePAM (KLONOPIN) 0.5 MG tablet Take 1 tablet (0.5 mg) by mouth 2 times daily as needed for anxiety 40 tablet 2     nicotine (NICORETTE) 2 MG gum PLACE 1 EACH INSIDE CHEEK AS NEEDED FOR SMOKING CESSATION. 30 each 3     sertraline (ZOLOFT) 100 MG tablet  1.5 tabs =150mg For depression/anxiety 135 tablet 1     cyclobenzaprine (FLEXERIL) 10 MG tablet Take 0.5-1 tablets (5-10 mg) by mouth nightly as needed for muscle spasms (Patient not taking: Reported on 2019) 30 tablet 0     gabapentin (NEURONTIN) 300 MG capsule Take 1 capsule (300 mg) by mouth nightly as needed Sleep/back pain (Patient not taking: Reported on 2019) 90 capsule 1     IBUPROFEN PO Take 600 mg by mouth 2 times daily As needed       OTC products: None, except as noted above    Allergies   Allergen Reactions     Amoxicillin      Ceclor [Cefaclor]      Codeine Itching     Erythromycin      Septra [Bactrim]      Suprax [Cefixime]       Latex Allergy: NO    Social History     Tobacco Use     Smoking status: Former Smoker     Packs/day: 0.50     Last attempt to quit: 2010     Years since quittin.0     Smokeless tobacco: Never Used   Substance Use Topics     Alcohol use: Yes     History   Drug Use No       REVIEW OF SYSTEMS:   Constitutional, neuro, ENT, endocrine, pulmonary, cardiac, gastrointestinal, genitourinary, musculoskeletal, integument and psychiatric systems are negative, except as otherwise noted.    EXAM:   /77   Pulse 110   Temp 99.3  F (37.4  C) (Oral)   Wt 93.4 kg (206 lb)   SpO2 96%   BMI 27.18 kg/m      GENERAL APPEARANCE: healthy, alert and no distress     EYES: EOMI, PERRL     HENT: ear canals and TM's normal and nose and mouth without ulcers or lesions     NECK: no adenopathy, no asymmetry, masses, or scars and thyroid normal to palpation     RESP: lungs clear to auscultation - no rales, rhonchi or wheezes     CV: regular rates and rhythm, normal S1 S2, no S3 or S4 and no murmur, click or rub     ABDOMEN:  soft, nontender, no HSM or masses and bowel sounds normal     MS: extremities normal- no gross deformities noted, no evidence of inflammation in joints, FROM in all extremities.     SKIN: no suspicious lesions or rashes     NEURO: Normal strength and tone,  sensory exam grossly normal, mentation intact and speech normal     PSYCH: mentation appears normal. and affect normal/bright     LYMPHATICS: No cervical adenopathy    DIAGNOSTICS:   No labs or EKG required for low risk surgery (cataract, skin procedure, breast biopsy, etc)    Recent Labs   Lab Test 07/20/11  1306   HGB 16.3         POTASSIUM 4.2   CR 1.04        IMPRESSION:   Reason for surgery/procedure: Pilar cyst  Diagnosis/reason for consult: preoperative clearance    The proposed surgical procedure is considered LOW risk.    REVISED CARDIAC RISK INDEX  The patient has the following serious cardiovascular risks for perioperative complications such as (MI, PE, VFib and 3  AV Block):  No serious cardiac risks  INTERPRETATION: 0 risks: Class I (very low risk - 0.4% complication rate)    The patient has the following additional risks for perioperative complications:  No identified additional risks      ICD-10-CM    1. Preop general physical exam Z01.818    2. Pilar cyst L72.11        RECOMMENDATIONS:         --Patient is to take all scheduled medications on the day of surgery    APPROVAL GIVEN to proceed with proposed procedure, without further diagnostic evaluation       Signed Electronically by: Kristen M. Kehr, PA-C  Chart reviewed, agree with evaluation and recommendations above.  Olimpia Alamo M.D.       Copy of this evaluation report is provided to requesting physician.    [facility] Preop Guidelines    Revised Cardiac Risk Index

## 2019-08-20 ASSESSMENT — ANXIETY QUESTIONNAIRES: GAD7 TOTAL SCORE: 2

## 2019-08-20 NOTE — PROGRESS NOTES
Subjective     Tejas Villalba III is a 28 year old male who presents to clinic today for the following health issues:    HPI     Depression and Anxiety Follow-Up    How are you doing with your depression since your last visit? Stable    How are you doing with your anxiety since your last visit?  Stable    Are you having other symptoms that might be associated with depression or anxiety? No    Have you had a significant life event? No     Do you have any concerns with your use of alcohol or other drugs? No    Social History     Tobacco Use     Smoking status: Former Smoker     Packs/day: 0.50     Last attempt to quit: 2010     Years since quittin.0     Smokeless tobacco: Never Used   Substance Use Topics     Alcohol use: Yes     Drug use: No     PHQ 2018   PHQ-9 Total Score 2 5 1   Q9: Thoughts of better off dead/self-harm past 2 weeks Not at all Not at all Not at all     NAOMI-7 SCORE 2018   Total Score - - -   Total Score 4 3 2         Suicide Assessment Five-step Evaluation and Treatment (SAFE-T)      How many servings of fruits and vegetables do you eat daily?  0-1    On average, how many sweetened beverages do you drink each day (soda, juice, sweet tea, etc)?   1    How many days per week do you miss taking your medication? 0      Smokes.       [place]CP.  Fills everything monthly.     Drinks caffeine/energy drinks. Pulse is elevated. We discuss he needs to severely limit his caffeine or his adderal medication may need to be discontinued.   He is aware of the risks of drinking caffeine with all of his medications.         Works part-time. adderall keeps him focused, without the medication he is easily distracted.     Anxiety: worse lately. Wants to talk to his pcp about changing zoloft not comfortable doing it now. Has not seen psych, this might be where he needs to go in the future.   Denies suicidal or homicidal thoughts.   Patient instructed to go to the emergency room or call 911 if these occur.    Previously on xanax but this made him feel funny so now on clonopin.   Is on zoloft 150 mg.   adderal for adhd.     Migraines-on fioricet. Helps his migraines. Takes frequently should look at preventative in the future.     Comes in every 6 months typically for PCP.           Patient Active Problem List   Diagnosis     CARDIOVASCULAR SCREENING; LDL GOAL LESS THAN 160     BMI 30.0-30.9,adult     ADD (attention deficit disorder)     Generalized anxiety disorder     Intermittent explosive disorder     Mid back pain     Intractable chronic migraine without aura and without status migrainosus     Major depressive disorder, recurrent episode, mild (H)     Chronic bilateral low back pain with right-sided sciatica     Attention deficit hyperactivity disorder (ADHD), other type     Past Surgical History:   Procedure Laterality Date     PE TUBES         Social History     Tobacco Use     Smoking status: Current Every Day Smoker     Packs/day: 0.50     Years: 10.00     Pack years: 5.00     Last attempt to quit: 2010     Years since quittin.0     Smokeless tobacco: Never Used   Substance Use Topics     Alcohol use: Yes     Comment: OCC     Family History   Problem Relation Age of Onset     Diabetes Father          Current Outpatient Medications   Medication Sig Dispense Refill     [START ON 10/30/2019] amphetamine-dextroamphetamine (ADDERALL) 30 MG tablet Take 1 tablet (30 mg) by mouth 2 times daily 60 tablet 0     butalbital-acetaminophen-caffeine (FIORICET/ESGIC) -40 MG tablet TAKE 1 TABLET BY MOUTH EVERY 6 HOURS AS NEEDED. AVOID WITH KLONOPIN/CLONAZEPAM 40 tablet 2     clonazePAM (KLONOPIN) 0.5 MG tablet Take 1 tablet (0.5 mg) by mouth 2 times daily as needed for anxiety 40 tablet 2     cyclobenzaprine (FLEXERIL) 10 MG tablet Take 0.5-1 tablets (5-10 mg) by mouth nightly as needed for muscle spasms 30 tablet 0     gabapentin  (NEURONTIN) 300 MG capsule Take 1 capsule (300 mg) by mouth nightly as needed Sleep/back pain 90 capsule 1     IBUPROFEN PO Take 600 mg by mouth 2 times daily As needed       nicotine (NICORETTE) 2 MG gum PLACE 1 EACH INSIDE CHEEK AS NEEDED FOR SMOKING CESSATION. 30 each 3     sertraline (ZOLOFT) 100 MG tablet 1.5 tabs =150mg For depression/anxiety 135 tablet 1     Allergies   Allergen Reactions     Amoxicillin      Ceclor [Cefaclor]      Codeine Itching     Erythromycin      Septra [Bactrim]      Suprax [Cefixime]          Reviewed and updated as needed this visit by Provider         Review of Systems   ROS COMP: Constitutional, HEENT, cardiovascular, pulmonary, GI, , musculoskeletal, neuro, skin, endocrine and psych systems are negative, except as otherwise noted.      Objective    /83   Pulse 99   Temp 98.7  F (37.1  C) (Oral)   Resp 18   Wt 94.3 kg (208 lb)   SpO2 99%   BMI 27.44 kg/m    Body mass index is 27.44 kg/m .  Physical Exam   GENERAL: healthy, alert and no distress  NECK: no adenopathy, no asymmetry, masses, or scars and thyroid normal to palpation  RESP: lungs clear to auscultation - no rales, rhonchi or wheezes  CV: regular rate and rhythm, normal S1 S2, no S3 or S4, no murmur, click or rub, no peripheral edema and peripheral pulses strong  MS: no gross musculoskeletal defects noted, no edema  NEURO: Normal strength and tone, mentation intact and speech normal  PSYCH: mentation appears normal, affect normal/bright    Diagnostic Test Results:  Labs reviewed in Epic        Assessment & Plan     1. Attention deficit hyperactivity disorder (ADHD), other type  Stable recheck 3 months no more refills    - amphetamine-dextroamphetamine (ADDERALL) 30 MG tablet; Take 1 tablet (30 mg) by mouth 2 times daily  Dispense: 60 tablet; Refill: 0    2. Tobacco abuse  Encouraged cessation  - nicotine (NICORETTE) 2 MG gum; PLACE 1 EACH INSIDE CHEEK AS NEEDED FOR SMOKING CESSATION.  Dispense: 30 each;  Refill: 3    3. Major depressive disorder, single episode, severe without psychotic features (H)  Needs improvement see HPI  He declines making medication changes now, didn't like his last antidepressant    - sertraline (ZOLOFT) 100 MG tablet; 1.5 tabs =150mg For depression/anxiety  Dispense: 135 tablet; Refill: 1    4. Episodic tension-type headache, not intractable  Stable  - butalbital-acetaminophen-caffeine (FIORICET/ESGIC) -40 MG tablet; TAKE 1 TABLET BY MOUTH EVERY 6 HOURS AS NEEDED. AVOID WITH KLONOPIN/CLONAZEPAM  Dispense: 40 tablet; Refill: 2    5. Generalized anxiety disorder  stable  - clonazePAM (KLONOPIN) 0.5 MG tablet; Take 1 tablet (0.5 mg) by mouth 2 times daily as needed for anxiety  Dispense: 40 tablet; Refill: 2     PATIENT IS TOLD TO SCHEDULE WAY AHEAD OF TIME for NEXT APPOINTMENT    NO REFILLS WILL BE GIVEN WITHOUT OFFICE VISIT        Return in about 3 months (around 11/30/2019) for med recheck.    Patient Instructions   Follow up with Dr. Waters in 3 months        Marita Terry PA-C  [facility]

## 2019-08-26 ENCOUNTER — ANESTHESIA EVENT (OUTPATIENT)
Dept: SURGERY | Facility: AMBULATORY SURGERY CENTER | Age: 29
End: 2019-08-26

## 2019-08-26 RX ORDER — ONDANSETRON 2 MG/ML
4 INJECTION INTRAMUSCULAR; INTRAVENOUS EVERY 30 MIN PRN
Status: CANCELLED | OUTPATIENT
Start: 2019-08-26

## 2019-08-26 RX ORDER — ONDANSETRON 4 MG/1
4 TABLET, ORALLY DISINTEGRATING ORAL EVERY 30 MIN PRN
Status: CANCELLED | OUTPATIENT
Start: 2019-08-26

## 2019-08-26 RX ORDER — HYDROMORPHONE HYDROCHLORIDE 1 MG/ML
.3-.5 INJECTION, SOLUTION INTRAMUSCULAR; INTRAVENOUS; SUBCUTANEOUS EVERY 10 MIN PRN
Status: CANCELLED | OUTPATIENT
Start: 2019-08-26

## 2019-08-26 RX ORDER — OXYCODONE HYDROCHLORIDE 5 MG/1
5 TABLET ORAL EVERY 4 HOURS PRN
Status: CANCELLED | OUTPATIENT
Start: 2019-08-26

## 2019-08-26 RX ORDER — ACETAMINOPHEN 325 MG/1
975 TABLET ORAL ONCE
Status: CANCELLED | OUTPATIENT
Start: 2019-08-26 | End: 2019-08-26

## 2019-08-26 RX ORDER — FENTANYL CITRATE 50 UG/ML
25-50 INJECTION, SOLUTION INTRAMUSCULAR; INTRAVENOUS
Status: CANCELLED | OUTPATIENT
Start: 2019-08-26

## 2019-08-26 RX ORDER — GABAPENTIN 300 MG/1
300 CAPSULE ORAL ONCE
Status: CANCELLED | OUTPATIENT
Start: 2019-08-26 | End: 2019-08-26

## 2019-08-26 RX ORDER — SODIUM CHLORIDE, SODIUM LACTATE, POTASSIUM CHLORIDE, CALCIUM CHLORIDE 600; 310; 30; 20 MG/100ML; MG/100ML; MG/100ML; MG/100ML
INJECTION, SOLUTION INTRAVENOUS CONTINUOUS
Status: CANCELLED | OUTPATIENT
Start: 2019-08-26

## 2019-08-26 RX ORDER — NALOXONE HYDROCHLORIDE 0.4 MG/ML
.1-.4 INJECTION, SOLUTION INTRAMUSCULAR; INTRAVENOUS; SUBCUTANEOUS
Status: CANCELLED | OUTPATIENT
Start: 2019-08-26 | End: 2019-08-27

## 2019-08-26 RX ORDER — MEPERIDINE HYDROCHLORIDE 25 MG/ML
12.5 INJECTION INTRAMUSCULAR; INTRAVENOUS; SUBCUTANEOUS
Status: CANCELLED | OUTPATIENT
Start: 2019-08-26

## 2019-08-26 NOTE — ANESTHESIA PREPROCEDURE EVALUATION
"Anesthesia Pre-Procedure Evaluation    Patient: Tejas Villalba III   MRN:     [id number] Gender:   male   Age:    28 year old :      1990        Preoperative Diagnosis: Pilar cyst   Procedure(s):  Excision of pilar cyst, choice anesthesia     Past Medical History:   Diagnosis Date     ADHD      HTN       Past Surgical History:   Procedure Laterality Date     PE TUBES                 [facility] AN PHYSICAL EXAM    LABS:  CBC:   Lab Results   Component Value Date    WBC 8.4 2011    WBC 8.1 2010    HGB 16.3 2011    HGB 15.9 2010    HCT 46.7 2011    HCT 45.9 2010     2011     2010     BMP:   Lab Results   Component Value Date     2011     2010    POTASSIUM 4.2 2011    POTASSIUM 4.3 2010    CHLORIDE 100 2011    CHLORIDE 103 2010    CO2 29 2011    CO2 29 2010    BUN 15 2011    BUN 17 2010    CR 1.04 2011    CR 0.91 2010    GLC 81 2011    GLC 77 2010     COAGS: No results found for: PTT, INR, FIBR  POC: No results found for: BGM, HCG, HCGS  OTHER:   Lab Results   Component Value Date    IMTIAZ 9.6 2011    ALBUMIN 4.8 2011    PROTTOTAL 7.8 2011    ALT 43 2011    AST 38 2011    ALKPHOS 45 2011    BILITOTAL 0.8 2011    TSH 1.47 2010        Preop Vitals    BP Readings from Last 3 Encounters:   19 131/77   19 136/86   19 141/86    Pulse Readings from Last 3 Encounters:   19 110   19 78   19 137      Resp Readings from Last 3 Encounters:   19 20   18 20   18 20    SpO2 Readings from Last 3 Encounters:   19 96%   19 100%   18 100%      Temp Readings from Last 1 Encounters:   19 99.3  F (37.4  C) (Oral)    Ht Readings from Last 1 Encounters:   19 1.854 m (6' 1\")      Wt Readings from Last 1 Encounters:   19 93.4 kg (206 lb)    " "Estimated body mass index is 27.18 kg/m  as calculated from the following:    Height as of 19: 1.854 m (6' 1\").    Weight as of 19: 93.4 kg (206 lb).     LDA:        Assessment:   ASA SCORE: 1    H&P: History and physical reviewed and following examination; no interval change.    NPO Status: NPO Appropriate     Plan:   Anes. Type:  General   Pre-Medication: None   Induction:  IV (Standard)   Airway: ETT; Oral   Access/Monitoring: PIV   Maintenance: Balanced     Postop Plan:   Postop Pain: Opioids  Postop Sedation/Airway: Not planned  Disposition: Outpatient     PONV Management:   Adult Risk Factors:, Postop Opioids   Prevention: Ondansetron, Dexamethasone     CONSENT: Direct conversation   Plan and risks discussed with: Patient          Comments for Plan/Consent:  General ETT unless surgeon thinks good candidate for MAC in prone position.                  Glen Ordoñez MD       ANESTHESIA PREOP EVALUATION    PROCEDURE: Procedure(s):  Excision of pilar cyst, choice anesthesia    HPI: Tejas Villalba III is a 28 year old male who presents for above procedure 2/2 pilar cyst    PAST MEDICAL HISTORY:    Past Medical History:   Diagnosis Date     ADHD      HTN        PAST SURGICAL HISTORY:    Past Surgical History:   Procedure Laterality Date     PE TUBES         SOCIAL HISTORY:       Social History     Tobacco Use     Smoking status: Former Smoker     Packs/day: 0.50     Last attempt to quit: 2010     Years since quittin.0     Smokeless tobacco: Never Used   Substance Use Topics     Alcohol use: Yes       ALLERGIES:     Allergies   Allergen Reactions     Amoxicillin      Ceclor [Cefaclor]      Codeine Itching     Erythromycin      Septra [Bactrim]      Suprax [Cefixime]        MEDICATIONS:       (Not in a hospital admission)    Current Outpatient Medications   Medication Sig Dispense Refill     amphetamine-dextroamphetamine (ADDERALL) 30 MG tablet Take 1 tablet (30 mg) by mouth 2 times daily 60 " tablet 0     butalbital-acetaminophen-caffeine (FIORICET/ESGIC) -40 MG tablet TAKE 1 TABLET BY MOUTH EVERY 6 HOURS AS NEEDED. AVOID WITH KLONOPIN/CLONAZEPAM 40 tablet 0     clonazePAM (KLONOPIN) 0.5 MG tablet Take 1 tablet (0.5 mg) by mouth 2 times daily as needed for anxiety 40 tablet 2     cyclobenzaprine (FLEXERIL) 10 MG tablet Take 0.5-1 tablets (5-10 mg) by mouth nightly as needed for muscle spasms (Patient not taking: Reported on 8/19/2019) 30 tablet 0     gabapentin (NEURONTIN) 300 MG capsule Take 1 capsule (300 mg) by mouth nightly as needed Sleep/back pain (Patient not taking: Reported on 8/19/2019) 90 capsule 1     IBUPROFEN PO Take 600 mg by mouth 2 times daily As needed       nicotine (NICORETTE) 2 MG gum PLACE 1 EACH INSIDE CHEEK AS NEEDED FOR SMOKING CESSATION. 30 each 3     sertraline (ZOLOFT) 100 MG tablet 1.5 tabs =150mg For depression/anxiety 135 tablet 1       Current Outpatient Medications Ordered in Epic   Medication Sig Dispense Refill     amphetamine-dextroamphetamine (ADDERALL) 30 MG tablet Take 1 tablet (30 mg) by mouth 2 times daily 60 tablet 0     butalbital-acetaminophen-caffeine (FIORICET/ESGIC) -40 MG tablet TAKE 1 TABLET BY MOUTH EVERY 6 HOURS AS NEEDED. AVOID WITH KLONOPIN/CLONAZEPAM 40 tablet 0     clonazePAM (KLONOPIN) 0.5 MG tablet Take 1 tablet (0.5 mg) by mouth 2 times daily as needed for anxiety 40 tablet 2     cyclobenzaprine (FLEXERIL) 10 MG tablet Take 0.5-1 tablets (5-10 mg) by mouth nightly as needed for muscle spasms (Patient not taking: Reported on 8/19/2019) 30 tablet 0     gabapentin (NEURONTIN) 300 MG capsule Take 1 capsule (300 mg) by mouth nightly as needed Sleep/back pain (Patient not taking: Reported on 8/19/2019) 90 capsule 1     IBUPROFEN PO Take 600 mg by mouth 2 times daily As needed       nicotine (NICORETTE) 2 MG gum PLACE 1 EACH INSIDE CHEEK AS NEEDED FOR SMOKING CESSATION. 30 each 3     sertraline (ZOLOFT) 100 MG tablet 1.5 tabs =150mg For  depression/anxiety 135 tablet 1     No current Epic-ordered facility-administered medications on file.        PHYSICAL EXAM:    Vitals: T Data Unavailable, P Data Unavailable, BP Data Unavailable, R Data Unavailable, SpO2  , Weight Wt Readings from Last 2 Encounters:   08/19/19 93.4 kg (206 lb)   07/25/19 95.7 kg (211 lb)       See doc flowsheet    NPO STATUS: see doc flowsheet    LABS:    BMP:  Recent Labs   Lab Test 07/20/11  1306      POTASSIUM 4.2   CHLORIDE 100   CO2 29   BUN 15   CR 1.04   GLC 81   IMTIAZ 9.6       LFTs:   Recent Labs   Lab Test 07/20/11  1306   PROTTOTAL 7.8   ALBUMIN 4.8   BILITOTAL 0.8   ALKPHOS 45   AST 38   ALT 43       CBC:   Recent Labs   Lab Test 07/20/11  1306   WBC 8.4   RBC 5.29   HGB 16.3   HCT 46.7   MCV 88   MCH 30.8   MCHC 35.0   RDW 12.2          Coags:  No results for input(s): INR, PTT, FIBR in the last 55736 hours.    Imaging:  No orders to display       Glen Ordoñez MD  Anesthesiology Staff  Pager [phone]    8/26/2019  3:48 PM

## 2019-08-27 ENCOUNTER — ANESTHESIA (OUTPATIENT)
Dept: SURGERY | Facility: AMBULATORY SURGERY CENTER | Age: 29
End: 2019-08-27

## 2019-08-27 RX ORDER — CEFAZOLIN SODIUM 2 G/100ML
2 INJECTION, SOLUTION INTRAVENOUS
Status: CANCELLED | OUTPATIENT
Start: 2019-08-27

## 2019-08-27 RX ORDER — CEFAZOLIN SODIUM 1 G/3ML
1 INJECTION, POWDER, FOR SOLUTION INTRAMUSCULAR; INTRAVENOUS SEE ADMIN INSTRUCTIONS
Status: CANCELLED | OUTPATIENT
Start: 2019-08-27

## 2019-08-30 ENCOUNTER — OFFICE VISIT (OUTPATIENT)
Dept: FAMILY MEDICINE | Facility: CLINIC | Age: 29
End: 2019-08-30
Payer: COMMERCIAL

## 2019-08-30 VITALS
WEIGHT: 208 LBS | OXYGEN SATURATION: 99 % | SYSTOLIC BLOOD PRESSURE: 131 MMHG | DIASTOLIC BLOOD PRESSURE: 83 MMHG | RESPIRATION RATE: 18 BRPM | HEART RATE: 99 BPM | BODY MASS INDEX: 27.44 KG/M2 | TEMPERATURE: 98.7 F

## 2019-08-30 DIAGNOSIS — F41.1 GENERALIZED ANXIETY DISORDER: ICD-10-CM

## 2019-08-30 DIAGNOSIS — Z72.0 TOBACCO ABUSE: ICD-10-CM

## 2019-08-30 DIAGNOSIS — G44.219 EPISODIC TENSION-TYPE HEADACHE, NOT INTRACTABLE: ICD-10-CM

## 2019-08-30 DIAGNOSIS — F32.2 MAJOR DEPRESSIVE DISORDER, SINGLE EPISODE, SEVERE WITHOUT PSYCHOTIC FEATURES (H): ICD-10-CM

## 2019-08-30 DIAGNOSIS — F90.8 ATTENTION DEFICIT HYPERACTIVITY DISORDER (ADHD), OTHER TYPE: ICD-10-CM

## 2019-08-30 PROCEDURE — 99214 OFFICE O/P EST MOD 30 MIN: CPT | Performed by: PHYSICIAN ASSISTANT

## 2019-08-30 RX ORDER — SERTRALINE HYDROCHLORIDE 100 MG/1
TABLET, FILM COATED ORAL
Qty: 135 TABLET | Refills: 1 | Status: SHIPPED | OUTPATIENT
Start: 2019-08-30 | End: 2019-12-12

## 2019-08-30 RX ORDER — DEXTROAMPHETAMINE SACCHARATE, AMPHETAMINE ASPARTATE, DEXTROAMPHETAMINE SULFATE AND AMPHETAMINE SULFATE 7.5; 7.5; 7.5; 7.5 MG/1; MG/1; MG/1; MG/1
30 TABLET ORAL 2 TIMES DAILY
Qty: 60 TABLET | Refills: 0 | Status: SHIPPED | OUTPATIENT
Start: 2019-09-30 | End: 2019-08-30

## 2019-08-30 RX ORDER — BUTALBITAL, ACETAMINOPHEN AND CAFFEINE 50; 325; 40 MG/1; MG/1; MG/1
TABLET ORAL
Qty: 40 TABLET | Refills: 2 | Status: SHIPPED | OUTPATIENT
Start: 2019-08-30 | End: 2019-11-26

## 2019-08-30 RX ORDER — DEXTROAMPHETAMINE SACCHARATE, AMPHETAMINE ASPARTATE, DEXTROAMPHETAMINE SULFATE AND AMPHETAMINE SULFATE 7.5; 7.5; 7.5; 7.5 MG/1; MG/1; MG/1; MG/1
30 TABLET ORAL 2 TIMES DAILY
Qty: 60 TABLET | Refills: 0 | Status: SHIPPED | OUTPATIENT
Start: 2019-10-30 | End: 2019-11-22

## 2019-08-30 RX ORDER — CLONAZEPAM 0.5 MG/1
0.5 TABLET ORAL 2 TIMES DAILY PRN
Qty: 40 TABLET | Refills: 2 | Status: SHIPPED | OUTPATIENT
Start: 2019-08-30 | End: 2019-11-26

## 2019-08-30 RX ORDER — DEXTROAMPHETAMINE SACCHARATE, AMPHETAMINE ASPARTATE, DEXTROAMPHETAMINE SULFATE AND AMPHETAMINE SULFATE 7.5; 7.5; 7.5; 7.5 MG/1; MG/1; MG/1; MG/1
30 TABLET ORAL 2 TIMES DAILY
Qty: 60 TABLET | Refills: 0 | Status: SHIPPED | OUTPATIENT
Start: 2019-08-30 | End: 2019-08-30

## 2019-08-30 ASSESSMENT — ANXIETY QUESTIONNAIRES
IF YOU CHECKED OFF ANY PROBLEMS ON THIS QUESTIONNAIRE, HOW DIFFICULT HAVE THESE PROBLEMS MADE IT FOR YOU TO DO YOUR WORK, TAKE CARE OF THINGS AT HOME, OR GET ALONG WITH OTHER PEOPLE: SOMEWHAT DIFFICULT
6. BECOMING EASILY ANNOYED OR IRRITABLE: NOT AT ALL
GAD7 TOTAL SCORE: 2
7. FEELING AFRAID AS IF SOMETHING AWFUL MIGHT HAPPEN: NOT AT ALL
3. WORRYING TOO MUCH ABOUT DIFFERENT THINGS: SEVERAL DAYS
1. FEELING NERVOUS, ANXIOUS, OR ON EDGE: NOT AT ALL
5. BEING SO RESTLESS THAT IT IS HARD TO SIT STILL: NOT AT ALL
2. NOT BEING ABLE TO STOP OR CONTROL WORRYING: SEVERAL DAYS

## 2019-08-30 ASSESSMENT — PATIENT HEALTH QUESTIONNAIRE - PHQ9
5. POOR APPETITE OR OVEREATING: NOT AT ALL
SUM OF ALL RESPONSES TO PHQ QUESTIONS 1-9: 2

## 2019-08-31 ASSESSMENT — ANXIETY QUESTIONNAIRES: GAD7 TOTAL SCORE: 2

## 2019-10-15 ENCOUNTER — MYC REFILL (OUTPATIENT)
Dept: FAMILY MEDICINE | Facility: CLINIC | Age: 29
End: 2019-10-15

## 2019-10-15 DIAGNOSIS — G89.29 CHRONIC BILATERAL LOW BACK PAIN WITH RIGHT-SIDED SCIATICA: ICD-10-CM

## 2019-10-15 DIAGNOSIS — M54.41 CHRONIC BILATERAL LOW BACK PAIN WITH RIGHT-SIDED SCIATICA: ICD-10-CM

## 2019-10-15 NOTE — LETTER
October 18, 2019    Tejas Villalba III  [address] [address] [address]    Dear Tejas,       We recently received a refill request for Medications.  We have refilled this for a one time supply only because you are due for a:    ADHD/Medication Refill office visit    Dr. Waters's schedule is scheduling out a ways currently. Please call to schedule and appointment.      Please call at your earliest convenience so that there will not be a delay with your future refills.          Thank you,   Your [facility] Team  [phone]

## 2019-10-16 RX ORDER — CYCLOBENZAPRINE HCL 10 MG
TABLET ORAL
Qty: 30 TABLET | Refills: 0 | Status: SHIPPED | OUTPATIENT
Start: 2019-10-16 | End: 2019-12-12

## 2019-10-16 NOTE — TELEPHONE ENCOUNTER
Pt last seen by Marita OLIVA.    Routing refill request to provider for review/approval because:  Drug not on the [facility] refill protocol   Shama Roberts BSN, RN

## 2019-10-17 RX ORDER — CYCLOBENZAPRINE HCL 10 MG
5-10 TABLET ORAL
Qty: 30 TABLET | Refills: 1 | Status: SHIPPED | OUTPATIENT
Start: 2019-10-17 | End: 2020-01-13

## 2019-10-17 RX ORDER — GABAPENTIN 300 MG/1
300 CAPSULE ORAL
Qty: 90 CAPSULE | Refills: 0 | Status: SHIPPED | OUTPATIENT
Start: 2019-10-17 | End: 2019-11-26

## 2019-10-25 ENCOUNTER — MYC REFILL (OUTPATIENT)
Dept: FAMILY MEDICINE | Facility: CLINIC | Age: 29
End: 2019-10-25

## 2019-10-25 DIAGNOSIS — F41.1 GENERALIZED ANXIETY DISORDER: ICD-10-CM

## 2019-10-25 RX ORDER — CLONAZEPAM 0.5 MG/1
0.5 TABLET ORAL 2 TIMES DAILY PRN
Qty: 40 TABLET | Refills: 2 | Status: CANCELLED | OUTPATIENT
Start: 2019-10-25

## 2019-11-21 ENCOUNTER — MYC MEDICAL ADVICE (OUTPATIENT)
Dept: FAMILY MEDICINE | Facility: CLINIC | Age: 29
End: 2019-11-21

## 2019-11-21 DIAGNOSIS — F90.8 ATTENTION DEFICIT HYPERACTIVITY DISORDER (ADHD), OTHER TYPE: ICD-10-CM

## 2019-11-22 RX ORDER — DEXTROAMPHETAMINE SACCHARATE, AMPHETAMINE ASPARTATE, DEXTROAMPHETAMINE SULFATE AND AMPHETAMINE SULFATE 7.5; 7.5; 7.5; 7.5 MG/1; MG/1; MG/1; MG/1
30 TABLET ORAL 2 TIMES DAILY
Qty: 60 TABLET | Refills: 0 | Status: SHIPPED | OUTPATIENT
Start: 2019-11-29 | End: 2019-12-23

## 2019-11-26 ENCOUNTER — MYC REFILL (OUTPATIENT)
Dept: FAMILY MEDICINE | Facility: CLINIC | Age: 29
End: 2019-11-26

## 2019-11-26 DIAGNOSIS — F41.1 GENERALIZED ANXIETY DISORDER: ICD-10-CM

## 2019-11-26 DIAGNOSIS — G44.219 EPISODIC TENSION-TYPE HEADACHE, NOT INTRACTABLE: ICD-10-CM

## 2019-11-26 DIAGNOSIS — G89.29 CHRONIC BILATERAL LOW BACK PAIN WITH RIGHT-SIDED SCIATICA: ICD-10-CM

## 2019-11-26 DIAGNOSIS — M54.41 CHRONIC BILATERAL LOW BACK PAIN WITH RIGHT-SIDED SCIATICA: ICD-10-CM

## 2019-11-26 RX ORDER — BUTALBITAL, ACETAMINOPHEN AND CAFFEINE 50; 325; 40 MG/1; MG/1; MG/1
TABLET ORAL
Qty: 40 TABLET | Refills: 0 | Status: SHIPPED | OUTPATIENT
Start: 2019-11-26 | End: 2019-12-23

## 2019-11-26 RX ORDER — GABAPENTIN 300 MG/1
300 CAPSULE ORAL
Qty: 90 CAPSULE | Refills: 0 | Status: SHIPPED | OUTPATIENT
Start: 2019-11-26 | End: 2019-12-23

## 2019-11-26 RX ORDER — CLONAZEPAM 0.5 MG/1
0.5 TABLET ORAL 2 TIMES DAILY PRN
Qty: 40 TABLET | Refills: 0 | Status: SHIPPED | OUTPATIENT
Start: 2019-11-26 | End: 2019-12-12

## 2019-11-27 ENCOUNTER — MYC REFILL (OUTPATIENT)
Dept: FAMILY MEDICINE | Facility: CLINIC | Age: 29
End: 2019-11-27

## 2019-11-27 DIAGNOSIS — M54.41 CHRONIC BILATERAL LOW BACK PAIN WITH RIGHT-SIDED SCIATICA: ICD-10-CM

## 2019-11-27 DIAGNOSIS — G89.29 CHRONIC BILATERAL LOW BACK PAIN WITH RIGHT-SIDED SCIATICA: ICD-10-CM

## 2019-11-27 RX ORDER — CYCLOBENZAPRINE HCL 10 MG
5-10 TABLET ORAL
Qty: 30 TABLET | Refills: 1 | Status: CANCELLED | OUTPATIENT
Start: 2019-11-27

## 2019-11-27 NOTE — TELEPHONE ENCOUNTER
Routing refill request to provider for review/approval because:  Drug not on the [facility] refill protocol.  Would you like to refill?  Thank you. Tayla Talley R.N.

## 2019-12-12 ENCOUNTER — OFFICE VISIT (OUTPATIENT)
Dept: FAMILY MEDICINE | Facility: CLINIC | Age: 29
End: 2019-12-12
Payer: COMMERCIAL

## 2019-12-12 VITALS
RESPIRATION RATE: 18 BRPM | SYSTOLIC BLOOD PRESSURE: 119 MMHG | HEART RATE: 103 BPM | DIASTOLIC BLOOD PRESSURE: 82 MMHG | HEIGHT: 73 IN | BODY MASS INDEX: 32.87 KG/M2 | TEMPERATURE: 98.3 F | WEIGHT: 248 LBS

## 2019-12-12 DIAGNOSIS — F90.8 ATTENTION DEFICIT HYPERACTIVITY DISORDER (ADHD), OTHER TYPE: Primary | ICD-10-CM

## 2019-12-12 DIAGNOSIS — F32.2 MAJOR DEPRESSIVE DISORDER, SINGLE EPISODE, SEVERE WITHOUT PSYCHOTIC FEATURES (H): ICD-10-CM

## 2019-12-12 DIAGNOSIS — F41.1 GENERALIZED ANXIETY DISORDER: ICD-10-CM

## 2019-12-12 PROCEDURE — 99214 OFFICE O/P EST MOD 30 MIN: CPT | Performed by: FAMILY MEDICINE

## 2019-12-12 RX ORDER — CLONAZEPAM 0.5 MG/1
0.5 TABLET ORAL 2 TIMES DAILY PRN
Qty: 60 TABLET | Refills: 0 | Status: SHIPPED | OUTPATIENT
Start: 2019-12-12 | End: 2020-01-24

## 2019-12-12 RX ORDER — SERTRALINE HYDROCHLORIDE 100 MG/1
TABLET, FILM COATED ORAL
Qty: 135 TABLET | Refills: 1 | Status: SHIPPED | OUTPATIENT
Start: 2019-12-12 | End: 2020-07-13

## 2019-12-12 ASSESSMENT — ANXIETY QUESTIONNAIRES
6. BECOMING EASILY ANNOYED OR IRRITABLE: NOT AT ALL
GAD7 TOTAL SCORE: 3
IF YOU CHECKED OFF ANY PROBLEMS ON THIS QUESTIONNAIRE, HOW DIFFICULT HAVE THESE PROBLEMS MADE IT FOR YOU TO DO YOUR WORK, TAKE CARE OF THINGS AT HOME, OR GET ALONG WITH OTHER PEOPLE: SOMEWHAT DIFFICULT
1. FEELING NERVOUS, ANXIOUS, OR ON EDGE: NOT AT ALL
7. FEELING AFRAID AS IF SOMETHING AWFUL MIGHT HAPPEN: NOT AT ALL
2. NOT BEING ABLE TO STOP OR CONTROL WORRYING: SEVERAL DAYS
3. WORRYING TOO MUCH ABOUT DIFFERENT THINGS: SEVERAL DAYS
5. BEING SO RESTLESS THAT IT IS HARD TO SIT STILL: NOT AT ALL

## 2019-12-12 ASSESSMENT — PATIENT HEALTH QUESTIONNAIRE - PHQ9
SUM OF ALL RESPONSES TO PHQ QUESTIONS 1-9: 3
5. POOR APPETITE OR OVEREATING: SEVERAL DAYS

## 2019-12-12 ASSESSMENT — MIFFLIN-ST. JEOR: SCORE: 2138.8

## 2019-12-12 NOTE — PROGRESS NOTES
"SUBJECTIVE:  Tejas Villalba III, a 29 year old male scheduled an appointment to discuss the following issues:  Follow-up adhd, anxiety, migraines, tobacco abuse and chronic pain issues.  5 year old son. X currently living with patient and son at parents.   Not currently working but likely will get job soon. Seeing therapist. No SUICIAL IDEATION OR HOMOCIDAL IDEATION OR BERNADETTE. Exercise - will work more - join gym. adderall most days. Down smoking - some weight gain.   No chest pain or shortness of breath.   Medical, social, surgical, and family histories reviewed.    ROS:  All other ROS negative.     OBJECTIVE:  /82   Pulse 103   Temp 98.3  F (36.8  C) (Oral)   Resp 18   Ht 1.854 m (6' 1\")   Wt 112.5 kg (248 lb)   BMI 32.72 kg/m    EXAM:  GENERAL APPEARANCE: healthy, alert and no distress  NECK: no adenopathy, no asymmetry, masses, or scars and thyroid normal to palpation  RESP: lungs clear to auscultation - no rales, rhonchi or wheezes  CV: regular rates and rhythm, normal S1 S2, no S3 or S4 and no murmur, click or rub -  ABDOMEN:  soft, nontender, no HSM or masses and bowel sounds normal  MS: extremities normal- no gross deformities noted, no evidence of inflammation in joints, FROM in all extremities.  PSYCH: mentation appears normal and affect normal/bright  PSYCH:mildly anxious, good insight    ASSESSMENT / PLAN:  (F90.8) Attention deficit hyperactivity disorder (ADHD), other type  (primary encounter diagnosis)  Comment: stable  Plan: continue prn adderall. Recheck in 6 months  exercise    (F41.1) Generalized anxiety disorder  Comment: worse with winter  Plan: clonazePAM (KLONOPIN) 0.5 MG tablet        Join gym and avoid ALCOHOL/caffeine. Prn klonopin. If SUICIAL IDEATION OR HOMOCIDAL IDEATION OR BERNADETTE TO ER     (F32.2) Major depressive disorder, single episode, severe without psychotic features (H)  Comment: stable  Plan: sertraline (ZOLOFT) 100 MG tablet        Reveiwed risks and side effects " of medication  Recheck in 6 months  Sooner if worse.       Karan Waters MD

## 2019-12-12 NOTE — NURSING NOTE
"Chief Complaint   Patient presents with     Medication Therapy Management       Initial /82   Pulse 103   Temp 98.3  F (36.8  C) (Oral)   Resp 18   Ht 1.854 m (6' 1\")   Wt 112.5 kg (248 lb)   BMI 32.72 kg/m   Estimated body mass index is 32.72 kg/m  as calculated from the following:    Height as of this encounter: 1.854 m (6' 1\").    Weight as of this encounter: 112.5 kg (248 lb).    Shama Stacy CMA    "

## 2019-12-13 ASSESSMENT — ANXIETY QUESTIONNAIRES: GAD7 TOTAL SCORE: 3

## 2019-12-22 DIAGNOSIS — G44.219 EPISODIC TENSION-TYPE HEADACHE, NOT INTRACTABLE: ICD-10-CM

## 2019-12-23 ENCOUNTER — MYC REFILL (OUTPATIENT)
Dept: FAMILY MEDICINE | Facility: CLINIC | Age: 29
End: 2019-12-23

## 2019-12-23 DIAGNOSIS — G89.29 CHRONIC BILATERAL LOW BACK PAIN WITH RIGHT-SIDED SCIATICA: ICD-10-CM

## 2019-12-23 DIAGNOSIS — F90.8 ATTENTION DEFICIT HYPERACTIVITY DISORDER (ADHD), OTHER TYPE: ICD-10-CM

## 2019-12-23 DIAGNOSIS — M54.41 CHRONIC BILATERAL LOW BACK PAIN WITH RIGHT-SIDED SCIATICA: ICD-10-CM

## 2019-12-23 RX ORDER — DEXTROAMPHETAMINE SACCHARATE, AMPHETAMINE ASPARTATE, DEXTROAMPHETAMINE SULFATE AND AMPHETAMINE SULFATE 7.5; 7.5; 7.5; 7.5 MG/1; MG/1; MG/1; MG/1
30 TABLET ORAL 2 TIMES DAILY
Qty: 60 TABLET | Refills: 0 | Status: SHIPPED | OUTPATIENT
Start: 2019-12-23 | End: 2020-01-16

## 2019-12-23 RX ORDER — BUTALBITAL, ACETAMINOPHEN AND CAFFEINE 50; 325; 40 MG/1; MG/1; MG/1
TABLET ORAL
Qty: 40 TABLET | Refills: 1 | Status: SHIPPED | OUTPATIENT
Start: 2019-12-23 | End: 2020-02-10

## 2019-12-23 RX ORDER — GABAPENTIN 300 MG/1
300 CAPSULE ORAL
Qty: 90 CAPSULE | Refills: 5 | Status: SHIPPED | OUTPATIENT
Start: 2019-12-23 | End: 2020-05-01

## 2019-12-23 NOTE — TELEPHONE ENCOUNTER
Routing refill request to provider for review/approval because:  Drug not on the [facility] refill protocol   Last refill 11/26/19  Last office visit 12/12/19  Jil Castañeda RN

## 2019-12-23 NOTE — TELEPHONE ENCOUNTER
Controlled Substance Refill Request for Adderall 30mg  Problem List Complete:  No     PROVIDER TO CONSIDER COMPLETION OF PROBLEM LIST AND OVERVIEW/CONTROLLED SUBSTANCE AGREEMENT    Last Written Prescription Date:  11/22/19  Last Fill Quantity: 60,   # refills: 0      Last Office Visit with [facility] primary care provider: 12/12/19      Controlled substance agreement:   Encounter-Level CSA:    There are no encounter-level csa.     Patient-Level CSA:    There are no patient-level csa.         Last Urine Drug Screen: No results found for: CDAUT, No results found for: COMDAT, No results found for: THC13, PCP13, COC13, MAMP13, OPI13, AMP13, BZO13, TCA13, MTD13, BAR13, OXY13, PPX13, BUP13      [email or web address]       checked in past 3 months?  Yes 12/23/19, last filled 11/22/19         Maddi MAYON, RN, CPN

## 2020-01-11 DIAGNOSIS — G89.29 CHRONIC BILATERAL LOW BACK PAIN WITH RIGHT-SIDED SCIATICA: ICD-10-CM

## 2020-01-11 DIAGNOSIS — M54.41 CHRONIC BILATERAL LOW BACK PAIN WITH RIGHT-SIDED SCIATICA: ICD-10-CM

## 2020-01-13 RX ORDER — CYCLOBENZAPRINE HCL 10 MG
TABLET ORAL
Qty: 30 TABLET | Refills: 1 | Status: SHIPPED | OUTPATIENT
Start: 2020-01-13 | End: 2020-04-01

## 2020-01-13 NOTE — TELEPHONE ENCOUNTER
Routing refill request to provider for review/approval because:  Drug not on the [facility] refill protocol   Last refilled 10/17/19 # 30 with 1 refill.  Jil Castañeda RN

## 2020-01-16 ENCOUNTER — MYC REFILL (OUTPATIENT)
Dept: FAMILY MEDICINE | Facility: CLINIC | Age: 30
End: 2020-01-16

## 2020-01-16 DIAGNOSIS — F90.8 ATTENTION DEFICIT HYPERACTIVITY DISORDER (ADHD), OTHER TYPE: ICD-10-CM

## 2020-01-16 RX ORDER — DEXTROAMPHETAMINE SACCHARATE, AMPHETAMINE ASPARTATE, DEXTROAMPHETAMINE SULFATE AND AMPHETAMINE SULFATE 7.5; 7.5; 7.5; 7.5 MG/1; MG/1; MG/1; MG/1
30 TABLET ORAL 2 TIMES DAILY
Qty: 60 TABLET | Refills: 0 | Status: SHIPPED | OUTPATIENT
Start: 2020-01-16 | End: 2020-02-10

## 2020-01-16 NOTE — TELEPHONE ENCOUNTER
Controlled Substance Refill Request for Adderall 30mg  Problem List Complete:  No      PROVIDER TO CONSIDER COMPLETION OF PROBLEM LIST AND OVERVIEW/CONTROLLED SUBSTANCE AGREEMENT     Last Written Prescription Date:  12/23/19  Last Fill Quantity: 60,   # refills: 0        Last Office Visit with [facility] primary care provider: 12/12/19        Controlled substance agreement:   Encounter-Level CSA:    There are no encounter-level csa.      Patient-Level CSA:    There are no patient-level csa.            Last Urine Drug Screen: No results found for: CDAUT, No results found for: COMDAT, No results found for: THC13, PCP13, COC13, MAMP13, OPI13, AMP13, BZO13, TCA13, MTD13, BAR13, OXY13, PPX13, BUP13      [email or web address]      checked in past 3 months?  Yes 12/23/19    Tangela MAYON, RN

## 2020-01-22 ENCOUNTER — TELEPHONE (OUTPATIENT)
Dept: FAMILY MEDICINE | Facility: CLINIC | Age: 30
End: 2020-01-22

## 2020-01-22 NOTE — TELEPHONE ENCOUNTER
Reason for Call:  Form, our goal is to have forms completed with 72 hours, however, some forms may require a visit or additional information.    Type of letter, form or note:  employer    Who is the form from?: Patient    Where did the form come from: Patient or family brought in       What clinic location was the form placed at?: [place]    Where the form was placed: [facility]  Box/Folder    What number is listed as a contact on the form?: [phone]       Additional comments: Needs signed for work, going to leave message in enrich-in as well.    Call taken on 1/22/2020 at 3:04 PM by Michael Davis

## 2020-01-23 ENCOUNTER — MYC MEDICAL ADVICE (OUTPATIENT)
Dept: FAMILY MEDICINE | Facility: CLINIC | Age: 30
End: 2020-01-23

## 2020-01-23 NOTE — LETTER
[facility]  [address] [facility] [address]  Phone: [phone]    January 24, 2020        Tejas Grigsbycarolynnniranjan CHATTERJEE  [address] [address] [address]          To whom it may concern:    RE: Tejas Villalba III    Patient has been seen and treated today at our clinic for year. Patient has stopped taking Klonopin for anxiety and sleep.   Please contact me for questions or concerns.      Sincerely,        Karan Waters MD

## 2020-01-23 NOTE — TELEPHONE ENCOUNTER
This I not a form to fill out.     Request is for a letter stating clonazepam is for anxiety/slepping issues has been discontinued as it is a disqualifier for driving commercial motor  Vehicles.([place]) Please note the new plan on letter as well.    I placed the paper that was left at the  for you to review in your basket.    Deirdre Munoz MA/RAHEEL

## 2020-01-24 NOTE — TELEPHONE ENCOUNTER
Dr Waters wrote the note. I faxed this to [facility] @ [phone].Deirdre Munoz MA/RAHEEL      See [employer] message also form 1/23/20

## 2020-01-24 NOTE — TELEPHONE ENCOUNTER
Dr Waters wrote the note. I faxed this to [facility] @ [phone].Deirdre Munoz MA/RAHEEL

## 2020-02-10 ENCOUNTER — MYC REFILL (OUTPATIENT)
Dept: FAMILY MEDICINE | Facility: CLINIC | Age: 30
End: 2020-02-10

## 2020-02-10 DIAGNOSIS — G44.219 EPISODIC TENSION-TYPE HEADACHE, NOT INTRACTABLE: ICD-10-CM

## 2020-02-10 DIAGNOSIS — F90.8 ATTENTION DEFICIT HYPERACTIVITY DISORDER (ADHD), OTHER TYPE: ICD-10-CM

## 2020-02-10 DIAGNOSIS — Z72.0 TOBACCO ABUSE: ICD-10-CM

## 2020-02-10 RX ORDER — BUTALBITAL, ACETAMINOPHEN AND CAFFEINE 50; 325; 40 MG/1; MG/1; MG/1
TABLET ORAL
Qty: 40 TABLET | Refills: 1 | Status: SHIPPED | OUTPATIENT
Start: 2020-02-10 | End: 2020-03-09

## 2020-02-10 RX ORDER — DEXTROAMPHETAMINE SACCHARATE, AMPHETAMINE ASPARTATE, DEXTROAMPHETAMINE SULFATE AND AMPHETAMINE SULFATE 7.5; 7.5; 7.5; 7.5 MG/1; MG/1; MG/1; MG/1
30 TABLET ORAL 2 TIMES DAILY
Qty: 60 TABLET | Refills: 0 | Status: SHIPPED | OUTPATIENT
Start: 2020-02-10 | End: 2020-03-09

## 2020-02-10 NOTE — TELEPHONE ENCOUNTER
Controlled Substance Refill Request for Adderall 30mg  Problem List Complete:  No      Last Written Prescription Date:  1/16/2020  Last Fill Quantity: 60,   # refills: 0      Controlled Substance Refill Request for Fioricet  Problem List Complete:  No      Last Written Prescription Date:  12/23/19  Last Fill Quantity: 40,   # refills: 1    Last Office Visit with [facility] primary care provider: 12/12/19        Controlled substance agreement:   Encounter-Level CSA:    There are no encounter-level csa.      Patient-Level CSA:    There are no patient-level csa.            Last Urine Drug Screen: No results found for: CDAUT, No results found for: COMDAT, No results found for: THC13, PCP13, COC13, MAMP13, OPI13, AMP13, BZO13, TCA13, MTD13, BAR13, OXY13, PPX13, BUP13      [email or web address]      checked in past 3 months?  Yes 12/23/19     Shama MAYON, RN

## 2020-02-24 ENCOUNTER — HEALTH MAINTENANCE LETTER (OUTPATIENT)
Age: 30
End: 2020-02-24

## 2020-03-09 ENCOUNTER — MYC REFILL (OUTPATIENT)
Dept: FAMILY MEDICINE | Facility: CLINIC | Age: 30
End: 2020-03-09

## 2020-03-09 DIAGNOSIS — F90.8 ATTENTION DEFICIT HYPERACTIVITY DISORDER (ADHD), OTHER TYPE: ICD-10-CM

## 2020-03-09 DIAGNOSIS — G89.29 CHRONIC BILATERAL LOW BACK PAIN WITH RIGHT-SIDED SCIATICA: ICD-10-CM

## 2020-03-09 DIAGNOSIS — G44.219 EPISODIC TENSION-TYPE HEADACHE, NOT INTRACTABLE: ICD-10-CM

## 2020-03-09 DIAGNOSIS — M54.41 CHRONIC BILATERAL LOW BACK PAIN WITH RIGHT-SIDED SCIATICA: ICD-10-CM

## 2020-03-09 RX ORDER — BUTALBITAL, ACETAMINOPHEN AND CAFFEINE 50; 325; 40 MG/1; MG/1; MG/1
TABLET ORAL
Qty: 40 TABLET | Refills: 1 | Status: SHIPPED | OUTPATIENT
Start: 2020-03-09 | End: 2020-03-31

## 2020-03-09 RX ORDER — DEXTROAMPHETAMINE SACCHARATE, AMPHETAMINE ASPARTATE, DEXTROAMPHETAMINE SULFATE AND AMPHETAMINE SULFATE 7.5; 7.5; 7.5; 7.5 MG/1; MG/1; MG/1; MG/1
30 TABLET ORAL 2 TIMES DAILY
Qty: 60 TABLET | Refills: 0 | Status: SHIPPED | OUTPATIENT
Start: 2020-03-09 | End: 2020-03-31

## 2020-03-09 RX ORDER — CYCLOBENZAPRINE HCL 10 MG
5-10 TABLET ORAL
Qty: 30 TABLET | Refills: 1 | Status: SHIPPED | OUTPATIENT
Start: 2020-03-09 | End: 2020-03-31

## 2020-03-09 NOTE — TELEPHONE ENCOUNTER
Controlled Substance Refill Request for Adderall 30mg  Problem List Complete:  No      Last Written Prescription Date:  2/11/2020  Last Fill Quantity: 60,   # refills: 0      Controlled Substance Refill Request for Fioricet  Problem List Complete:  No      Last Written Prescription Date:  2/11/2020  Last Fill Quantity: 40,   # refills: 1     Last Office Visit with [facility] primary care provider: 12/12/19        Controlled substance agreement:   Encounter-Level CSA:    There are no encounter-level csa.      Patient-Level CSA:    There are no patient-level csa.            Last Urine Drug Screen: No results found for: CDAUT, No results found for: COMDAT, No results found for: THC13, PCP13, COC13, MAMP13, OPI13, AMP13, BZO13, TCA13, MTD13, BAR13, OXY13, PPX13, BUP13      [email or web address]      checked in past 3 months?  Yes 12/23/19     Shama MAYON, RN

## 2020-03-31 ENCOUNTER — MYC REFILL (OUTPATIENT)
Dept: FAMILY MEDICINE | Facility: CLINIC | Age: 30
End: 2020-03-31

## 2020-03-31 DIAGNOSIS — G89.29 CHRONIC BILATERAL LOW BACK PAIN WITH RIGHT-SIDED SCIATICA: ICD-10-CM

## 2020-03-31 DIAGNOSIS — M54.41 CHRONIC BILATERAL LOW BACK PAIN WITH RIGHT-SIDED SCIATICA: ICD-10-CM

## 2020-03-31 DIAGNOSIS — G44.219 EPISODIC TENSION-TYPE HEADACHE, NOT INTRACTABLE: ICD-10-CM

## 2020-03-31 DIAGNOSIS — Z72.0 TOBACCO ABUSE: ICD-10-CM

## 2020-03-31 DIAGNOSIS — F32.2 MAJOR DEPRESSIVE DISORDER, SINGLE EPISODE, SEVERE WITHOUT PSYCHOTIC FEATURES (H): ICD-10-CM

## 2020-03-31 DIAGNOSIS — F90.8 ATTENTION DEFICIT HYPERACTIVITY DISORDER (ADHD), OTHER TYPE: ICD-10-CM

## 2020-03-31 RX ORDER — CYCLOBENZAPRINE HCL 10 MG
TABLET ORAL
Qty: 30 TABLET | Refills: 1 | Status: CANCELLED | OUTPATIENT
Start: 2020-03-31

## 2020-03-31 RX ORDER — SERTRALINE HYDROCHLORIDE 100 MG/1
TABLET, FILM COATED ORAL
Qty: 135 TABLET | Refills: 1 | Status: CANCELLED | OUTPATIENT
Start: 2020-03-31

## 2020-03-31 RX ORDER — GABAPENTIN 300 MG/1
300 CAPSULE ORAL
Qty: 90 CAPSULE | Refills: 5 | Status: CANCELLED | OUTPATIENT
Start: 2020-03-31

## 2020-04-01 RX ORDER — BUTALBITAL, ACETAMINOPHEN AND CAFFEINE 50; 325; 40 MG/1; MG/1; MG/1
TABLET ORAL
Qty: 40 TABLET | Refills: 1 | Status: SHIPPED | OUTPATIENT
Start: 2020-04-01 | End: 2020-05-01

## 2020-04-01 RX ORDER — CYCLOBENZAPRINE HCL 10 MG
5-10 TABLET ORAL
Qty: 30 TABLET | Refills: 1 | Status: SHIPPED | OUTPATIENT
Start: 2020-04-01 | End: 2020-05-01

## 2020-04-01 RX ORDER — DEXTROAMPHETAMINE SACCHARATE, AMPHETAMINE ASPARTATE, DEXTROAMPHETAMINE SULFATE AND AMPHETAMINE SULFATE 7.5; 7.5; 7.5; 7.5 MG/1; MG/1; MG/1; MG/1
30 TABLET ORAL 2 TIMES DAILY
Qty: 60 TABLET | Refills: 0 | Status: SHIPPED | OUTPATIENT
Start: 2020-04-01 | End: 2020-05-01

## 2020-04-01 NOTE — TELEPHONE ENCOUNTER
Controlled Substance Refill Request for Adderall 30mg  Problem List Complete:  No      Last Written Prescription Date:  3/9/2020  Last Fill Quantity: 60,   # refills: 0      Controlled Substance Refill Request for Fioricet  Problem List Complete:  No      Last Written Prescription Date:  3/9/2020  Last Fill Quantity: 40,   # refills: 1     Last Office Visit with [facility] [place] care provider: 12/12/19        Controlled substance agreement:   Encounter-Level CSA:    There are no encounter-level csa.      Patient-Level CSA:    There are no patient-level csa.            Last Urine Drug Screen: No results found for: CDAUT, No results found for: COMDAT, No results found for: THC13, PCP13, COC13, MAMP13, OPI13, AMP13, BZO13, TCA13, MTD13, BAR13, OXY13, PPX13, BUP13      [email or web address]      checked in past 3 months?  Yes 4/1/2020     Shama MAYON, RN

## 2020-05-01 ENCOUNTER — MYC REFILL (OUTPATIENT)
Dept: FAMILY MEDICINE | Facility: CLINIC | Age: 30
End: 2020-05-01

## 2020-05-01 DIAGNOSIS — M54.41 CHRONIC BILATERAL LOW BACK PAIN WITH RIGHT-SIDED SCIATICA: ICD-10-CM

## 2020-05-01 DIAGNOSIS — F90.8 ATTENTION DEFICIT HYPERACTIVITY DISORDER (ADHD), OTHER TYPE: ICD-10-CM

## 2020-05-01 DIAGNOSIS — G44.219 EPISODIC TENSION-TYPE HEADACHE, NOT INTRACTABLE: ICD-10-CM

## 2020-05-01 DIAGNOSIS — G89.29 CHRONIC BILATERAL LOW BACK PAIN WITH RIGHT-SIDED SCIATICA: ICD-10-CM

## 2020-05-01 RX ORDER — DEXTROAMPHETAMINE SACCHARATE, AMPHETAMINE ASPARTATE, DEXTROAMPHETAMINE SULFATE AND AMPHETAMINE SULFATE 7.5; 7.5; 7.5; 7.5 MG/1; MG/1; MG/1; MG/1
30 TABLET ORAL 2 TIMES DAILY
Qty: 60 TABLET | Refills: 0 | Status: SHIPPED | OUTPATIENT
Start: 2020-05-01 | End: 2020-05-26

## 2020-05-01 RX ORDER — CYCLOBENZAPRINE HCL 10 MG
5-10 TABLET ORAL
Qty: 30 TABLET | Refills: 1 | Status: SHIPPED | OUTPATIENT
Start: 2020-05-01 | End: 2020-09-02

## 2020-05-01 RX ORDER — GABAPENTIN 300 MG/1
300 CAPSULE ORAL
Qty: 90 CAPSULE | Refills: 0 | Status: SHIPPED | OUTPATIENT
Start: 2020-05-01 | End: 2020-10-29

## 2020-05-01 RX ORDER — BUTALBITAL, ACETAMINOPHEN AND CAFFEINE 50; 325; 40 MG/1; MG/1; MG/1
TABLET ORAL
Qty: 40 TABLET | Refills: 1 | Status: SHIPPED | OUTPATIENT
Start: 2020-05-01 | End: 2020-07-03

## 2020-05-01 NOTE — TELEPHONE ENCOUNTER
Controlled Substance Refill Request for Adderall 30mg  Problem List Complete:  No      Last Written Prescription Date:  4/1/2020  Last Fill Quantity: 60,   # refills: 0      Controlled Substance Refill Request for Fioricet  Problem List Complete:  No      Last Written Prescription Date:  4/1/2020  Last Fill Quantity: 40,   # refills: 1     Last Office Visit with [facility] primary care provider: 12/12/19        Controlled substance agreement:   Encounter-Level CSA:    There are no encounter-level csa.      Patient-Level CSA:    There are no patient-level csa.            Last Urine Drug Screen: No results found for: CDAUT, No results found for: COMDAT, No results found for: THC13, PCP13, COC13, MAMP13, OPI13, AMP13, BZO13, TCA13, MTD13, BAR13, OXY13, PPX13, BUP13      [email or web address]      checked in past 3 months?  Yes 4/1/2020     Shama MAYON, RN

## 2020-05-26 ENCOUNTER — MYC REFILL (OUTPATIENT)
Dept: FAMILY MEDICINE | Facility: CLINIC | Age: 30
End: 2020-05-26

## 2020-05-26 DIAGNOSIS — F90.8 ATTENTION DEFICIT HYPERACTIVITY DISORDER (ADHD), OTHER TYPE: ICD-10-CM

## 2020-05-26 NOTE — LETTER
May 27, 2020    Tejas Villalba III  [address] [address] [facility] [address] [address]    Dear Tejas,       We recently received a refill request for amphetamine-dextroamphetamine (ADDERALL) 30 MG tablet .  We have refilled this for a one time 30 day supply only because you are due for a:    Video medication check visit-needed in the next 2-3 weeks per Dr Waters.      Please call at your earliest convenience so that there will not be a delay with your future refills.          Thank you,   Your [facility] Team/  [phone]

## 2020-05-27 RX ORDER — DEXTROAMPHETAMINE SACCHARATE, AMPHETAMINE ASPARTATE, DEXTROAMPHETAMINE SULFATE AND AMPHETAMINE SULFATE 7.5; 7.5; 7.5; 7.5 MG/1; MG/1; MG/1; MG/1
30 TABLET ORAL 2 TIMES DAILY
Qty: 60 TABLET | Refills: 0 | Status: SHIPPED | OUTPATIENT
Start: 2020-05-27 | End: 2020-07-03

## 2020-05-27 NOTE — TELEPHONE ENCOUNTER
Controlled Substance Refill Request for Adderall  Problem List Complete:  No     PROVIDER TO CONSIDER COMPLETION OF PROBLEM LIST AND OVERVIEW/CONTROLLED SUBSTANCE AGREEMENT    Last Written Prescription Date:  5/1/20  Last Fill Quantity: 60,   # refills: 0    Last Office Visit with [facility] primary care provider: 12/12/19    Controlled substance agreement:   Encounter-Level CSA:    There are no encounter-level csa.     Patient-Level CSA:    There are no patient-level csa.         Last Urine Drug Screen: No results found for: CDAUT, No results found for: COMDAT, No results found for: THC13, PCP13, COC13, MAMP13, OPI13, AMP13, BZO13, TCA13, MTD13, BAR13, OXY13, PPX13, BUP13       [email or web address]       checked in past 3 months?  Yes 5/27/20, no concerns       Maddi MAYON, RN, CPN

## 2020-06-30 ENCOUNTER — MYC MEDICAL ADVICE (OUTPATIENT)
Dept: FAMILY MEDICINE | Facility: CLINIC | Age: 30
End: 2020-06-30

## 2020-06-30 DIAGNOSIS — F90.8 ATTENTION DEFICIT HYPERACTIVITY DISORDER (ADHD), OTHER TYPE: ICD-10-CM

## 2020-06-30 DIAGNOSIS — G44.219 EPISODIC TENSION-TYPE HEADACHE, NOT INTRACTABLE: ICD-10-CM

## 2020-06-30 NOTE — TELEPHONE ENCOUNTER
::  Please assist patient with setting up a visit with Dr. Karan Waters.  Then route to Dr. Karan Waters to see if will ok refills of his butalbital/acetaminophen/caffeine and the adderall.  His pharmacy states has refills still available on all his other meds.  Ailyn Alexandre RN

## 2020-07-02 NOTE — TELEPHONE ENCOUNTER
Patient is scheduled for a video visit with you on 7/13/20. Can you refill his medication until then?Deirdre Munoz MA/RAHEEL

## 2020-07-03 RX ORDER — BUTALBITAL, ACETAMINOPHEN AND CAFFEINE 50; 325; 40 MG/1; MG/1; MG/1
TABLET ORAL
Qty: 40 TABLET | Refills: 0 | Status: SHIPPED | OUTPATIENT
Start: 2020-07-03 | End: 2020-07-14

## 2020-07-03 RX ORDER — DEXTROAMPHETAMINE SACCHARATE, AMPHETAMINE ASPARTATE, DEXTROAMPHETAMINE SULFATE AND AMPHETAMINE SULFATE 7.5; 7.5; 7.5; 7.5 MG/1; MG/1; MG/1; MG/1
30 TABLET ORAL 2 TIMES DAILY
Qty: 60 TABLET | Refills: 0 | Status: SHIPPED | OUTPATIENT
Start: 2020-07-03 | End: 2020-08-10

## 2020-07-13 ENCOUNTER — VIRTUAL VISIT (OUTPATIENT)
Dept: FAMILY MEDICINE | Facility: CLINIC | Age: 30
End: 2020-07-13
Payer: COMMERCIAL

## 2020-07-13 ENCOUNTER — MYC MEDICAL ADVICE (OUTPATIENT)
Dept: FAMILY MEDICINE | Facility: CLINIC | Age: 30
End: 2020-07-13

## 2020-07-13 DIAGNOSIS — G44.219 EPISODIC TENSION-TYPE HEADACHE, NOT INTRACTABLE: ICD-10-CM

## 2020-07-13 DIAGNOSIS — F32.2 MAJOR DEPRESSIVE DISORDER, SINGLE EPISODE, SEVERE WITHOUT PSYCHOTIC FEATURES (H): ICD-10-CM

## 2020-07-13 DIAGNOSIS — M54.41 CHRONIC BILATERAL LOW BACK PAIN WITH RIGHT-SIDED SCIATICA: ICD-10-CM

## 2020-07-13 DIAGNOSIS — G89.29 CHRONIC BILATERAL LOW BACK PAIN WITH RIGHT-SIDED SCIATICA: ICD-10-CM

## 2020-07-13 DIAGNOSIS — F90.8 ATTENTION DEFICIT HYPERACTIVITY DISORDER (ADHD), OTHER TYPE: ICD-10-CM

## 2020-07-13 PROCEDURE — 99214 OFFICE O/P EST MOD 30 MIN: CPT | Mod: 95 | Performed by: FAMILY MEDICINE

## 2020-07-13 RX ORDER — SERTRALINE HYDROCHLORIDE 100 MG/1
TABLET, FILM COATED ORAL
Qty: 135 TABLET | Refills: 1 | Status: SHIPPED | OUTPATIENT
Start: 2020-07-13 | End: 2020-12-23

## 2020-07-13 RX ORDER — DEXTROAMPHETAMINE SACCHARATE, AMPHETAMINE ASPARTATE MONOHYDRATE, DEXTROAMPHETAMINE SULFATE AND AMPHETAMINE SULFATE 5; 5; 5; 5 MG/1; MG/1; MG/1; MG/1
20 CAPSULE, EXTENDED RELEASE ORAL 2 TIMES DAILY
Qty: 60 CAPSULE | Refills: 0 | Status: SHIPPED | OUTPATIENT
Start: 2020-07-31 | End: 2020-08-10

## 2020-07-13 RX ORDER — METHOCARBAMOL 500 MG/1
TABLET, FILM COATED ORAL
Qty: 40 TABLET | Refills: 1 | Status: SHIPPED | OUTPATIENT
Start: 2020-07-13 | End: 2020-09-02

## 2020-07-13 ASSESSMENT — PATIENT HEALTH QUESTIONNAIRE - PHQ9
SUM OF ALL RESPONSES TO PHQ QUESTIONS 1-9: 3
5. POOR APPETITE OR OVEREATING: NOT AT ALL

## 2020-07-13 ASSESSMENT — ANXIETY QUESTIONNAIRES
IF YOU CHECKED OFF ANY PROBLEMS ON THIS QUESTIONNAIRE, HOW DIFFICULT HAVE THESE PROBLEMS MADE IT FOR YOU TO DO YOUR WORK, TAKE CARE OF THINGS AT HOME, OR GET ALONG WITH OTHER PEOPLE: SOMEWHAT DIFFICULT
3. WORRYING TOO MUCH ABOUT DIFFERENT THINGS: SEVERAL DAYS
1. FEELING NERVOUS, ANXIOUS, OR ON EDGE: SEVERAL DAYS
7. FEELING AFRAID AS IF SOMETHING AWFUL MIGHT HAPPEN: SEVERAL DAYS
2. NOT BEING ABLE TO STOP OR CONTROL WORRYING: SEVERAL DAYS
5. BEING SO RESTLESS THAT IT IS HARD TO SIT STILL: NOT AT ALL
GAD7 TOTAL SCORE: 5
6. BECOMING EASILY ANNOYED OR IRRITABLE: SEVERAL DAYS

## 2020-07-13 NOTE — PROGRESS NOTES
"Tejas Villalba III is a 29 year old male who is being evaluated via a billable video visit.    Follow-up adhd, anxiety, migraines, tobacco abuse and chronic pain issues.   adderall taking everyday. Sleep ok. No chest pain. Gabapentin not as much. Emotionally stressful at work. No current dating. Boy doing ok. No SUICIAL IDEATION OR HOMOCIDAL IDEATION OR BERNADETTE. Some smoking -uses nicotine. Rare ALCOHOL.   Interested in adderall XR because shorter acting adderall not as effective. Insurance not bad.   Would like prn daytime muscles relaxor. Taking flexeril for bedtime.   The patient has been notified of following:     \"This video visit will be conducted via a call between you and your physician/provider. We have found that certain health care needs can be provided without the need for an in-person physical exam.  This service lets us provide the care you need with a video conversation.  If a prescription is necessary we can send it directly to your pharmacy.  If lab work is needed we can place an order for that and you can then stop by our lab to have the test done at a later time.    Video visits are billed at different rates depending on your insurance coverage.  Please reach out to your insurance provider with any questions.    If during the course of the call the physician/provider feels a video visit is not appropriate, you will not be charged for this service.\"    Patient has given verbal consent for Video visit? Yes  How would you like to obtain your AVS? Marilu  Patient would like the video invitation sent by: Text to cell phone: [phone]  Will anyone else be joining your video visit? No      Subjective     Tejas Villalba III is a 29 year old male who presents today via video visit for the following health issues:    HPI  Depression and Anxiety Follow-Up    How are you doing with your depression since your last visit? No change    How are you doing with your anxiety since your last visit?  No " change    Are you having other symptoms that might be associated with depression or anxiety? No    Have you had a significant life event? No     Do you have any concerns with your use of alcohol or other drugs? No    Social History     Tobacco Use     Smoking status: Current Every Day Smoker     Packs/day: 0.50     Years: 10.00     Pack years: 5.00     Last attempt to quit: 2010     Years since quittin.9     Smokeless tobacco: Never Used   Substance Use Topics     Alcohol use: Yes     Comment: OCC     Drug use: No     PHQ 2019   PHQ-9 Total Score 1 2 3   Q9: Thoughts of better off dead/self-harm past 2 weeks Not at all Not at all Not at all     NAOMI-7 SCORE 2019   Total Score - - -   Total Score 2 2 3       Suicide Assessment Five-step Evaluation and Treatment (SAFE-T)      How many servings of fruits and vegetables do you eat daily?  0-1    On average, how many sweetened beverages do you drink each day (Examples: soda, juice, sweet tea, etc.  Do NOT count diet or artificially sweetened beverages)?   3    How many days per week do you exercise enough to make your heart beat faster? 3 or less    How many minutes a day do you exercise enough to make your heart beat faster? 20 - 29    How many days per week do you miss taking your medication? 0    Medication Followup of amphetamine-dextroamphetamine (ADDERALL) 30 MG tablet     Taking Medication as prescribed: yes    Side Effects:  None    Medication Helping Symptoms:  Yes            Video Start Time: 3:50 PM      Reviewed and updated as needed this visit by Provider         Review of Systems   All other ROS negative.      Objective             Physical Exam     GENERAL: Healthy, alert and no distress  EYES: Eyes grossly normal to inspection.  No discharge or erythema, or obvious scleral/conjunctival abnormalities.  RESP: No audible wheeze, cough, or visible cyanosis.  No visible retractions or increased work  of breathing.    SKIN: Visible skin clear. No significant rash, abnormal pigmentation or lesions.  NEURO: Cranial nerves grossly intact.  Mentation and speech appropriate for age.  PSYCH: Mentation appears normal, affect normal/bright, judgement and insight intact, normal speech and appearance well-groomed.      Diagnostic Test Results:  Labs reviewed in Epic      ASSESSMENT / PLAN:  (F90.8) Attention deficit hyperactivity disorder (ADHD), other type  Comment: patient would like to try xr  Plan: amphetamine-dextroamphetamine (ADDERALL XR) 20         MG 24 hr capsule        Reveiwed risks and side effects of medication  Bad to regular if too expensive/not effective. Will need to see psych for help if worse.     (F32.2) Major depressive disorder, single episode, severe without psychotic features (H)  Comment: stable  Plan: sertraline (ZOLOFT) 100 MG tablet        Continue exercise. If SUICIAL IDEATION OR HOMOCIDAL IDEATION OR BERNADETTE TO ER. Recheck in 6 months  Sooner if worse.    (M54.41,  G89.29) Chronic bilateral low back pain with right-sided sciatica  Comment: needs help daytime  Plan: methocarbamol (ROBAXIN) 500 MG tablet        Reveiwed risks and side effects of medication  Continue flexeril prn bedtime. Specialist if worse.           Video-Visit Details    Type of service:  Video Visit    Video End Time:4:00 PM    Originating Location (pt. Location): Home    Distant Location (provider location):  [facility]     Platform used for Video Visit: [facility]    No follow-ups on file.       Karan Waters MD

## 2020-07-14 RX ORDER — BUTALBITAL, ACETAMINOPHEN AND CAFFEINE 50; 325; 40 MG/1; MG/1; MG/1
TABLET ORAL
Qty: 40 TABLET | Refills: 1 | Status: SHIPPED | OUTPATIENT
Start: 2020-07-14 | End: 2020-09-02

## 2020-07-14 ASSESSMENT — ANXIETY QUESTIONNAIRES: GAD7 TOTAL SCORE: 5

## 2020-07-14 NOTE — TELEPHONE ENCOUNTER
Controlled Substance Refill Request for butalbital-acetaminophen-caffeine (ESGIC) -40 MG tablet   Problem List Complete:  No     PROVIDER TO CONSIDER COMPLETION OF PROBLEM LIST AND OVERVIEW/CONTROLLED SUBSTANCE AGREEMENT    Last Written Prescription Date:  7/3/20  Last Fill Quantity: 40,   # refills: 0    THE MOST RECENT OFFICE VISIT MUST BE WITHIN THE PAST 3 MONTHS. AT LEAST ONE FACE TO FACE VISIT MUST OCCUR EVERY 6 MONTHS. ADDITIONAL VISITS CAN BE VIRTUAL.  (THIS STATEMENT SHOULD BE DELETED.)    Last Office Visit with [facility] [place] care provider: Virtual Visit 7/13/20    Future Office visit:     Controlled substance agreement:   Encounter-Level CSA:    There are no encounter-level csa.     Patient-Level CSA:    There are no patient-level csa.         Last Urine Drug Screen: No results found for: CDAUT, No results found for: COMDAT, No results found for: THC13, PCP13, COC13, MAMP13, OPI13, AMP13, BZO13, TCA13, MTD13, BAR13, OXY13, PPX13, BUP13    [email or web address]     checked in past 3 months?  Yes Per review of  on 7/14/20, last fill date 7/3/20    GAEL LutzN, RN

## 2020-08-10 ENCOUNTER — MYC MEDICAL ADVICE (OUTPATIENT)
Dept: FAMILY MEDICINE | Facility: CLINIC | Age: 30
End: 2020-08-10

## 2020-08-10 DIAGNOSIS — F90.8 ATTENTION DEFICIT HYPERACTIVITY DISORDER (ADHD), OTHER TYPE: ICD-10-CM

## 2020-08-10 RX ORDER — DEXTROAMPHETAMINE SACCHARATE, AMPHETAMINE ASPARTATE, DEXTROAMPHETAMINE SULFATE AND AMPHETAMINE SULFATE 7.5; 7.5; 7.5; 7.5 MG/1; MG/1; MG/1; MG/1
30 TABLET ORAL 2 TIMES DAILY
Qty: 60 TABLET | Refills: 0 | Status: SHIPPED | OUTPATIENT
Start: 2020-08-10 | End: 2020-09-02

## 2020-09-02 ENCOUNTER — MYC REFILL (OUTPATIENT)
Dept: FAMILY MEDICINE | Facility: CLINIC | Age: 30
End: 2020-09-02

## 2020-09-02 DIAGNOSIS — G44.219 EPISODIC TENSION-TYPE HEADACHE, NOT INTRACTABLE: ICD-10-CM

## 2020-09-02 DIAGNOSIS — M54.41 CHRONIC BILATERAL LOW BACK PAIN WITH RIGHT-SIDED SCIATICA: ICD-10-CM

## 2020-09-02 DIAGNOSIS — F90.8 ATTENTION DEFICIT HYPERACTIVITY DISORDER (ADHD), OTHER TYPE: ICD-10-CM

## 2020-09-02 DIAGNOSIS — G89.29 CHRONIC BILATERAL LOW BACK PAIN WITH RIGHT-SIDED SCIATICA: ICD-10-CM

## 2020-09-02 NOTE — TELEPHONE ENCOUNTER
Controlled Substance Refill Request for butalbital-acetaminophen-caffeine (ESGIC) -40 MG tablet   Problem List Complete:  No     Last Written Prescription Date:  7/14/20  Last Fill Quantity: 40,   # refills: 1    Controlled Substance Refill Request Adderall   for Problem List Complete:  No     Last Written Prescription Date:  8/10/20  Last Fill Quantity: 60,   # refills: 0    [place] [facility]: [place] Visit 7/13/20     Future Office visit:      Controlled substance agreement:   Encounter-Level CSA:    There are no encounter-level csa.      Patient-Level CSA:    There are no patient-level csa.            Last Urine Drug Screen: No results found for: CDAUT, No results found for: COMDAT, No results found for: THC13, PCP13, COC13, MAMP13, OPI13, AMP13, BZO13, TCA13, MTD13, BAR13, OXY13, PPX13, BUP13     [email or web address]      checked in past 3 months?  Yes, 7/14/20  Shama MAYON, RN

## 2020-09-03 RX ORDER — METHOCARBAMOL 500 MG/1
TABLET, FILM COATED ORAL
Qty: 40 TABLET | Refills: 1 | Status: SHIPPED | OUTPATIENT
Start: 2020-09-03 | End: 2020-10-30

## 2020-09-03 RX ORDER — BUTALBITAL, ACETAMINOPHEN AND CAFFEINE 50; 325; 40 MG/1; MG/1; MG/1
TABLET ORAL
Qty: 40 TABLET | Refills: 1 | Status: SHIPPED | OUTPATIENT
Start: 2020-09-03 | End: 2020-10-29

## 2020-09-03 RX ORDER — DEXTROAMPHETAMINE SACCHARATE, AMPHETAMINE ASPARTATE, DEXTROAMPHETAMINE SULFATE AND AMPHETAMINE SULFATE 7.5; 7.5; 7.5; 7.5 MG/1; MG/1; MG/1; MG/1
30 TABLET ORAL 2 TIMES DAILY
Qty: 60 TABLET | Refills: 0 | Status: SHIPPED | OUTPATIENT
Start: 2020-09-07 | End: 2020-10-02

## 2020-09-03 RX ORDER — CYCLOBENZAPRINE HCL 10 MG
5-10 TABLET ORAL
Qty: 30 TABLET | Refills: 1 | Status: SHIPPED | OUTPATIENT
Start: 2020-09-03 | End: 2020-12-07

## 2020-09-28 ENCOUNTER — E-VISIT (OUTPATIENT)
Dept: FAMILY MEDICINE | Facility: CLINIC | Age: 30
End: 2020-09-28
Payer: COMMERCIAL

## 2020-09-28 ENCOUNTER — MYC MEDICAL ADVICE (OUTPATIENT)
Dept: FAMILY MEDICINE | Facility: CLINIC | Age: 30
End: 2020-09-28

## 2020-09-28 DIAGNOSIS — Z53.9 ERRONEOUS ENCOUNTER--DISREGARD: Primary | ICD-10-CM

## 2020-09-28 ASSESSMENT — ANXIETY QUESTIONNAIRES
5. BEING SO RESTLESS THAT IT IS HARD TO SIT STILL: NOT AT ALL
3. WORRYING TOO MUCH ABOUT DIFFERENT THINGS: SEVERAL DAYS
7. FEELING AFRAID AS IF SOMETHING AWFUL MIGHT HAPPEN: SEVERAL DAYS
1. FEELING NERVOUS, ANXIOUS, OR ON EDGE: SEVERAL DAYS
7. FEELING AFRAID AS IF SOMETHING AWFUL MIGHT HAPPEN: SEVERAL DAYS
6. BECOMING EASILY ANNOYED OR IRRITABLE: NOT AT ALL
2. NOT BEING ABLE TO STOP OR CONTROL WORRYING: SEVERAL DAYS
GAD7 TOTAL SCORE: 5
GAD7 TOTAL SCORE: 5
4. TROUBLE RELAXING: SEVERAL DAYS

## 2020-09-28 NOTE — TELEPHONE ENCOUNTER
Please change to a video visit or office visit. Too complex for simple evisit. Thanks. Karan Waters MD

## 2020-09-29 ASSESSMENT — ANXIETY QUESTIONNAIRES: GAD7 TOTAL SCORE: 5

## 2020-09-30 NOTE — TELEPHONE ENCOUNTER
Dr. Waters we were gone when we got your message about adding pt on Tuesday morning.  Can we add him another time this week?  Shama MAYON, RN

## 2020-10-01 ENCOUNTER — VIRTUAL VISIT (OUTPATIENT)
Dept: FAMILY MEDICINE | Facility: CLINIC | Age: 30
End: 2020-10-01
Payer: COMMERCIAL

## 2020-10-01 DIAGNOSIS — F41.1 GENERALIZED ANXIETY DISORDER: Primary | ICD-10-CM

## 2020-10-01 DIAGNOSIS — G47.00 INSOMNIA, UNSPECIFIED TYPE: ICD-10-CM

## 2020-10-01 PROCEDURE — 99214 OFFICE O/P EST MOD 30 MIN: CPT | Mod: 95 | Performed by: FAMILY MEDICINE

## 2020-10-01 RX ORDER — TRAZODONE HYDROCHLORIDE 50 MG/1
50 TABLET, FILM COATED ORAL
Qty: 30 TABLET | Refills: 1 | Status: SHIPPED | OUTPATIENT
Start: 2020-10-01 | End: 2021-01-11

## 2020-10-01 RX ORDER — HYDROXYZINE PAMOATE 25 MG/1
CAPSULE ORAL
Qty: 30 CAPSULE | Refills: 1 | Status: SHIPPED | OUTPATIENT
Start: 2020-10-01 | End: 2021-01-11

## 2020-10-01 NOTE — PROGRESS NOTES
"Tejas Villalba III is a 29 year old male who is being evaluated via a billable video visit.      Worse anxiety/interested in trazodone for sleep.   Follow-up adhd, anxiety, migraines, tobacco abuse and chronic pain issues.  Had appendix removed last month and dog . Pain improved. Son doing going on 6 year old. Back with mom of son - currently going ok.   Work - hit/miss.   Sleep poor. Flexeril not very helpful with sleep.   Trazodone helpful in past. No SUICIAL IDEATION OR HOMOCIDAL IDEATION OR BERNADETTE.  Watches TV before bed and caffeine 1-2 coffee or energy drink sometimes in pm. Occasionally ALCOHOL. Some exercise. Emotionally doing ok overall. Can fall asleep and then awakens. ambien in past -not helpful.  The patient has been notified of following:     \"This video visit will be conducted via a call between you and your physician/provider. We have found that certain health care needs can be provided without the need for an in-person physical exam.  This service lets us provide the care you need with a video conversation.  If a prescription is necessary we can send it directly to your pharmacy.  If lab work is needed we can place an order for that and you can then stop by our lab to have the test done at a later time.    Video visits are billed at different rates depending on your insurance coverage.  Please reach out to your insurance provider with any questions.    If during the course of the call the physician/provider feels a video visit is not appropriate, you will not be charged for this service.\"    Patient has given verbal consent for Video visit? Yes  How would you like to obtain your AVS? MyChart  If you are dropped from the video visit, the video invite should be resent to: Text to cell phone: [phone]   Will anyone else be joining your video visit? No    Subjective     Tejas Villalba III is a 29 year old male who presents today via video visit for the following health " issues:    HPI     Recheck Medication -sleep/depression        Video Start Time: 10:50 AM      Review of Systems   All other ROS negative      Objective           Vitals:  No vitals were obtained today due to virtual visit.    Physical Exam     GENERAL: Healthy, alert and no distress  EYES: Eyes grossly normal to inspection.  No discharge or erythema, or obvious scleral/conjunctival abnormalities.  RESP: No audible wheeze, cough, or visible cyanosis.  No visible retractions or increased work of breathing.    SKIN: Visible skin clear. No significant rash, abnormal pigmentation or lesions.  NEURO: Cranial nerves grossly intact.  Mentation and speech appropriate for age.  PSYCH: Mentation appears normal, affect normal/bright, judgement and insight intact, normal speech and appearance well-groomed.        ASSESSMENT / PLAN:  (F41.1) Generalized anxiety disorder  (primary encounter diagnosis)  Comment: stable  Plan: hydrOXYzine (VISTARIL) 25 MG capsule        Continue zoloft. Exercise. If SUICIAL IDEATION OR HOMOCIDAL IDEATION OR BERNADETTE TO ER Recheck in 4 months  Sooner if worse    (G47.00) Insomnia, unspecified type  Comment: needs help  Plan: hydrOXYzine (VISTARIL) 25 MG capsule, traZODone        (DESYREL) 50 MG tablet        Trazodone helpful in past but can interact with zoloft and flexeril. Will try vistaril first and trazodone if not helpful but would need to hold flexeril. Reveiwed risks and side effects of medication  Expected course and warning signs reviewed.. limit caffeine/ avoid ALCOHOL or TV at bedtime and continue some exercise -not too much before bedtime. Call/email with questions/concerns. Consider remeron or lunesta or sleep specialist too.           Video-Visit Details    Type of service:  Video Visit    Video End Time:10:59 AM    Originating Location (pt. Location): Home    Distant Location (provider location):  [facility]     Platform used for Video Visit: Rachell

## 2020-10-02 ENCOUNTER — MYC REFILL (OUTPATIENT)
Dept: FAMILY MEDICINE | Facility: CLINIC | Age: 30
End: 2020-10-02

## 2020-10-02 DIAGNOSIS — F90.8 ATTENTION DEFICIT HYPERACTIVITY DISORDER (ADHD), OTHER TYPE: ICD-10-CM

## 2020-10-02 RX ORDER — DEXTROAMPHETAMINE SACCHARATE, AMPHETAMINE ASPARTATE, DEXTROAMPHETAMINE SULFATE AND AMPHETAMINE SULFATE 7.5; 7.5; 7.5; 7.5 MG/1; MG/1; MG/1; MG/1
30 TABLET ORAL 2 TIMES DAILY
Qty: 60 TABLET | Refills: 0 | Status: SHIPPED | OUTPATIENT
Start: 2020-10-02 | End: 2020-10-29

## 2020-10-02 NOTE — TELEPHONE ENCOUNTER
Controlled Substance Refill Request Adderall   for Problem List Complete:  No      Last Written Prescription Date:  9/7/20  Last Fill Quantity: 60,   # refills: 0     Last Office Visit with [facility] primary care provider: Virtual Visit 10/1/20     Future Office visit:      Controlled substance agreement:   Encounter-Level CSA:    There are no encounter-level csa.      Patient-Level CSA:    There are no patient-level csa.            Last Urine Drug Screen: No results found for: CDAUT, No results found for: COMDAT, No results found for: THC13, PCP13, COC13, MAMP13, OPI13, AMP13, BZO13, TCA13, MTD13, BAR13, OXY13, PPX13, BUP13     [email or web address]      checked in past 3 months?  Yes, 7/14/20  Shama MAYON, RN

## 2020-10-29 ENCOUNTER — MYC REFILL (OUTPATIENT)
Dept: FAMILY MEDICINE | Facility: CLINIC | Age: 30
End: 2020-10-29

## 2020-10-29 DIAGNOSIS — M54.41 CHRONIC BILATERAL LOW BACK PAIN WITH RIGHT-SIDED SCIATICA: ICD-10-CM

## 2020-10-29 DIAGNOSIS — G89.29 CHRONIC BILATERAL LOW BACK PAIN WITH RIGHT-SIDED SCIATICA: ICD-10-CM

## 2020-10-29 DIAGNOSIS — G44.219 EPISODIC TENSION-TYPE HEADACHE, NOT INTRACTABLE: ICD-10-CM

## 2020-10-29 DIAGNOSIS — F90.8 ATTENTION DEFICIT HYPERACTIVITY DISORDER (ADHD), OTHER TYPE: ICD-10-CM

## 2020-10-29 RX ORDER — BUTALBITAL, ACETAMINOPHEN AND CAFFEINE 50; 325; 40 MG/1; MG/1; MG/1
TABLET ORAL
Qty: 40 TABLET | Refills: 1 | Status: SHIPPED | OUTPATIENT
Start: 2020-10-29 | End: 2020-11-27

## 2020-10-29 RX ORDER — DEXTROAMPHETAMINE SACCHARATE, AMPHETAMINE ASPARTATE, DEXTROAMPHETAMINE SULFATE AND AMPHETAMINE SULFATE 7.5; 7.5; 7.5; 7.5 MG/1; MG/1; MG/1; MG/1
30 TABLET ORAL 2 TIMES DAILY
Qty: 60 TABLET | Refills: 0 | Status: SHIPPED | OUTPATIENT
Start: 2020-10-31 | End: 2020-11-27

## 2020-10-29 RX ORDER — GABAPENTIN 300 MG/1
300 CAPSULE ORAL
Qty: 90 CAPSULE | Refills: 0 | Status: SHIPPED | OUTPATIENT
Start: 2020-10-29 | End: 2020-12-23

## 2020-10-29 NOTE — TELEPHONE ENCOUNTER
Controlled Substance Refill Request for butalbital-acetaminophen-caffeine (ESGIC) -40 MG tablet   Problem List Complete:  No      Last Written Prescription Date:  9/3/20  Last Fill Quantity: 40,   # refills: 1     Controlled Substance Refill Request Adderall   for Problem List Complete:  No      Last Written Prescription Date:  10/2/20  Last Fill Quantity: 60,   # refills: 0     Last Office Visit with [facility] primary care provider: Virtual Visit 10/1/20     Future Office visit:      Controlled substance agreement:   Encounter-Level CSA:    There are no encounter-level csa.      Patient-Level CSA:    There are no patient-level csa.            Last Urine Drug Screen: No results found for: CDAUT, No results found for: COMDAT, No results found for: THC13, PCP13, COC13, MAMP13, OPI13, AMP13, BZO13, TCA13, MTD13, BAR13, OXY13, PPX13, BUP13     [email or web address]      checked in past 3 months?  Yes, 10/29/20  Shama MAYON, RN

## 2020-10-30 DIAGNOSIS — G89.29 CHRONIC BILATERAL LOW BACK PAIN WITH RIGHT-SIDED SCIATICA: ICD-10-CM

## 2020-10-30 DIAGNOSIS — M54.41 CHRONIC BILATERAL LOW BACK PAIN WITH RIGHT-SIDED SCIATICA: ICD-10-CM

## 2020-10-30 RX ORDER — METHOCARBAMOL 500 MG/1
TABLET, FILM COATED ORAL
Qty: 40 TABLET | Refills: 1 | Status: SHIPPED | OUTPATIENT
Start: 2020-10-30 | End: 2020-11-27

## 2020-11-27 ENCOUNTER — MYC REFILL (OUTPATIENT)
Dept: FAMILY MEDICINE | Facility: CLINIC | Age: 30
End: 2020-11-27

## 2020-11-27 DIAGNOSIS — M54.41 CHRONIC BILATERAL LOW BACK PAIN WITH RIGHT-SIDED SCIATICA: ICD-10-CM

## 2020-11-27 DIAGNOSIS — F90.8 ATTENTION DEFICIT HYPERACTIVITY DISORDER (ADHD), OTHER TYPE: ICD-10-CM

## 2020-11-27 DIAGNOSIS — G89.29 CHRONIC BILATERAL LOW BACK PAIN WITH RIGHT-SIDED SCIATICA: ICD-10-CM

## 2020-11-27 DIAGNOSIS — G44.219 EPISODIC TENSION-TYPE HEADACHE, NOT INTRACTABLE: ICD-10-CM

## 2020-11-30 RX ORDER — METHOCARBAMOL 500 MG/1
TABLET, FILM COATED ORAL
Qty: 40 TABLET | Refills: 1 | Status: SHIPPED | OUTPATIENT
Start: 2020-11-30 | End: 2021-02-16

## 2020-11-30 RX ORDER — DEXTROAMPHETAMINE SACCHARATE, AMPHETAMINE ASPARTATE, DEXTROAMPHETAMINE SULFATE AND AMPHETAMINE SULFATE 7.5; 7.5; 7.5; 7.5 MG/1; MG/1; MG/1; MG/1
30 TABLET ORAL 2 TIMES DAILY
Qty: 60 TABLET | Refills: 0 | Status: SHIPPED | OUTPATIENT
Start: 2020-11-30 | End: 2020-12-22

## 2020-11-30 RX ORDER — BUTALBITAL, ACETAMINOPHEN AND CAFFEINE 50; 325; 40 MG/1; MG/1; MG/1
TABLET ORAL
Qty: 40 TABLET | Refills: 0 | Status: SHIPPED | OUTPATIENT
Start: 2020-11-30 | End: 2020-12-22

## 2020-11-30 NOTE — TELEPHONE ENCOUNTER
Controlled Substance Refill Request for butalbital-acetaminophen-caffeine (ESGIC) -40 MG tablet   Problem List Complete:  No      Last Written Prescription Date:  9/29/20  Last Fill Quantity: 40,   # refills: 1     Controlled Substance Refill Request Adderall   for Problem List Complete:  No      Last Written Prescription Date:  10/31/20  Last Fill Quantity: 60,   # refills: 0     Last Office Visit with [facility] primary care provider: Virtual Visit 10/1/20     Future Office visit:      Controlled substance agreement:   Encounter-Level CSA:    There are no encounter-level csa.      Patient-Level CSA:    There are no patient-level csa.            Last Urine Drug Screen: No results found for: CDAUT, No results found for: COMDAT, No results found for: THC13, PCP13, COC13, MAMP13, OPI13, AMP13, BZO13, TCA13, MTD13, BAR13, OXY13, PPX13, BUP13     [email or web address]      checked in past 3 months?  Yes, 10/29/20  Shama MAYON, RN

## 2020-12-07 DIAGNOSIS — M54.41 CHRONIC BILATERAL LOW BACK PAIN WITH RIGHT-SIDED SCIATICA: ICD-10-CM

## 2020-12-07 DIAGNOSIS — G89.29 CHRONIC BILATERAL LOW BACK PAIN WITH RIGHT-SIDED SCIATICA: ICD-10-CM

## 2020-12-07 RX ORDER — CYCLOBENZAPRINE HCL 10 MG
TABLET ORAL
Qty: 30 TABLET | Refills: 1 | Status: SHIPPED | OUTPATIENT
Start: 2020-12-07 | End: 2021-01-22

## 2020-12-13 ENCOUNTER — HEALTH MAINTENANCE LETTER (OUTPATIENT)
Age: 30
End: 2020-12-13

## 2020-12-22 ENCOUNTER — MYC REFILL (OUTPATIENT)
Dept: FAMILY MEDICINE | Facility: CLINIC | Age: 30
End: 2020-12-22

## 2020-12-22 DIAGNOSIS — F90.8 ATTENTION DEFICIT HYPERACTIVITY DISORDER (ADHD), OTHER TYPE: ICD-10-CM

## 2020-12-22 DIAGNOSIS — M54.41 CHRONIC BILATERAL LOW BACK PAIN WITH RIGHT-SIDED SCIATICA: ICD-10-CM

## 2020-12-22 DIAGNOSIS — G89.29 CHRONIC BILATERAL LOW BACK PAIN WITH RIGHT-SIDED SCIATICA: ICD-10-CM

## 2020-12-22 DIAGNOSIS — G44.219 EPISODIC TENSION-TYPE HEADACHE, NOT INTRACTABLE: ICD-10-CM

## 2020-12-22 RX ORDER — GABAPENTIN 300 MG/1
300 CAPSULE ORAL
Qty: 90 CAPSULE | Refills: 0 | Status: CANCELLED | OUTPATIENT
Start: 2020-12-22

## 2020-12-23 DIAGNOSIS — F32.2 MAJOR DEPRESSIVE DISORDER, SINGLE EPISODE, SEVERE WITHOUT PSYCHOTIC FEATURES (H): ICD-10-CM

## 2020-12-23 DIAGNOSIS — M54.41 CHRONIC BILATERAL LOW BACK PAIN WITH RIGHT-SIDED SCIATICA: ICD-10-CM

## 2020-12-23 DIAGNOSIS — G89.29 CHRONIC BILATERAL LOW BACK PAIN WITH RIGHT-SIDED SCIATICA: ICD-10-CM

## 2020-12-23 RX ORDER — SERTRALINE HYDROCHLORIDE 100 MG/1
TABLET, FILM COATED ORAL
Qty: 135 TABLET | Refills: 0 | Status: SHIPPED | OUTPATIENT
Start: 2020-12-23 | End: 2021-03-29

## 2020-12-23 RX ORDER — BUTALBITAL, ACETAMINOPHEN AND CAFFEINE 50; 325; 40 MG/1; MG/1; MG/1
TABLET ORAL
Qty: 40 TABLET | Refills: 0 | Status: SHIPPED | OUTPATIENT
Start: 2020-12-29 | End: 2021-01-11

## 2020-12-23 RX ORDER — GABAPENTIN 300 MG/1
CAPSULE ORAL
Qty: 90 CAPSULE | Refills: 0 | Status: SHIPPED | OUTPATIENT
Start: 2020-12-23

## 2020-12-23 RX ORDER — DEXTROAMPHETAMINE SACCHARATE, AMPHETAMINE ASPARTATE, DEXTROAMPHETAMINE SULFATE AND AMPHETAMINE SULFATE 7.5; 7.5; 7.5; 7.5 MG/1; MG/1; MG/1; MG/1
30 TABLET ORAL 2 TIMES DAILY
Qty: 60 TABLET | Refills: 0 | Status: SHIPPED | OUTPATIENT
Start: 2020-12-29 | End: 2021-01-22

## 2020-12-23 NOTE — TELEPHONE ENCOUNTER
Routing refill request to provider for review/approval because:  Drug not on the [facility] refill protocol   Shama Roberts BSN, RN

## 2020-12-23 NOTE — TELEPHONE ENCOUNTER
Controlled Substance Refill Request for butalbital-acetaminophen-caffeine (ESGIC) -40 MG tablet   Problem List Complete:  No      Last Written Prescription Date:  11/30/20  Last Fill Quantity: 40,   # refills: 0     Controlled Substance Refill Request Adderall   for Problem List Complete:  No      Last Written Prescription Date:  11/30/20  Last Fill Quantity: 60,   # refills: 0     [place] [facility] [place]: [place] Visit 10/1/20     Future Office visit:      Controlled substance agreement:   Encounter-Level CSA:    There are no encounter-level csa.      Patient-Level CSA:    There are no patient-level csa.            Last Urine Drug Screen: No results found for: CDAUT, No results found for: COMDAT, No results found for: THC13, PCP13, COC13, MAMP13, OPI13, AMP13, BZO13, TCA13, MTD13, BAR13, OXY13, PPX13, BUP13     [email or web address]      checked in past 3 months?  Yes, 10/29/20  Shama MAYON, RN

## 2021-01-11 ENCOUNTER — MYC REFILL (OUTPATIENT)
Dept: FAMILY MEDICINE | Facility: CLINIC | Age: 31
End: 2021-01-11

## 2021-01-11 DIAGNOSIS — G44.219 EPISODIC TENSION-TYPE HEADACHE, NOT INTRACTABLE: ICD-10-CM

## 2021-01-11 DIAGNOSIS — G47.00 INSOMNIA, UNSPECIFIED TYPE: ICD-10-CM

## 2021-01-11 DIAGNOSIS — F41.1 GENERALIZED ANXIETY DISORDER: ICD-10-CM

## 2021-01-11 RX ORDER — TRAZODONE HYDROCHLORIDE 50 MG/1
TABLET, FILM COATED ORAL
Qty: 30 TABLET | Refills: 1 | Status: SHIPPED | OUTPATIENT
Start: 2021-01-11 | End: 2021-03-01

## 2021-01-11 RX ORDER — BUTALBITAL, ACETAMINOPHEN AND CAFFEINE 50; 325; 40 MG/1; MG/1; MG/1
TABLET ORAL
Qty: 40 TABLET | Refills: 0 | Status: SHIPPED | OUTPATIENT
Start: 2021-01-11 | End: 2021-02-16

## 2021-01-11 RX ORDER — HYDROXYZINE PAMOATE 25 MG/1
CAPSULE ORAL
Qty: 30 CAPSULE | Refills: 1 | Status: SHIPPED | OUTPATIENT
Start: 2021-01-11 | End: 2021-03-10

## 2021-01-19 ENCOUNTER — MYC MEDICAL ADVICE (OUTPATIENT)
Dept: FAMILY MEDICINE | Facility: CLINIC | Age: 31
End: 2021-01-19

## 2021-01-19 NOTE — TELEPHONE ENCOUNTER
Please notify we don't do DOT exams anymore in primary care. Chiropractor or occuptional med does these. I don't recommend covid shots for children unless very high risk breathing issues. Karan Waters MD

## 2021-01-22 ENCOUNTER — MYC REFILL (OUTPATIENT)
Dept: FAMILY MEDICINE | Facility: CLINIC | Age: 31
End: 2021-01-22

## 2021-01-22 DIAGNOSIS — M54.41 CHRONIC BILATERAL LOW BACK PAIN WITH RIGHT-SIDED SCIATICA: ICD-10-CM

## 2021-01-22 DIAGNOSIS — G89.29 CHRONIC BILATERAL LOW BACK PAIN WITH RIGHT-SIDED SCIATICA: ICD-10-CM

## 2021-01-22 DIAGNOSIS — F90.8 ATTENTION DEFICIT HYPERACTIVITY DISORDER (ADHD), OTHER TYPE: ICD-10-CM

## 2021-01-22 RX ORDER — CYCLOBENZAPRINE HCL 10 MG
TABLET ORAL
Qty: 30 TABLET | Refills: 1 | Status: SHIPPED | OUTPATIENT
Start: 2021-01-22 | End: 2021-04-23

## 2021-01-22 RX ORDER — DEXTROAMPHETAMINE SACCHARATE, AMPHETAMINE ASPARTATE, DEXTROAMPHETAMINE SULFATE AND AMPHETAMINE SULFATE 7.5; 7.5; 7.5; 7.5 MG/1; MG/1; MG/1; MG/1
30 TABLET ORAL 2 TIMES DAILY
Qty: 60 TABLET | Refills: 0 | Status: SHIPPED | OUTPATIENT
Start: 2021-01-28 | End: 2021-02-22

## 2021-01-27 ENCOUNTER — TELEPHONE (OUTPATIENT)
Dept: FAMILY MEDICINE | Facility: CLINIC | Age: 31
End: 2021-01-27

## 2021-01-27 NOTE — TELEPHONE ENCOUNTER
Reason for Call:  Form, our goal is to have forms completed with 72 hours, however, some forms may require a visit or additional information.    Type of letter, form or note:  medical    Who is the form from?: Patient    Where did the form come from: Patient or family brought in       What clinic location was the form placed at?: [place]    Where the form was placed: [facility]    What number is listed as a contact on the form?: [phone]       Additional comments: Please fax and let patient know when done    Call taken on 1/27/2021 at 9:21 AM by Brigid Grande

## 2021-01-29 NOTE — TELEPHONE ENCOUNTER
i Left msg for pt to give us a call to schedule a video visit in order for forms to be completed.     Kathi Jolley,  [place]  reception.

## 2021-02-14 DIAGNOSIS — G44.219 EPISODIC TENSION-TYPE HEADACHE, NOT INTRACTABLE: ICD-10-CM

## 2021-02-14 DIAGNOSIS — G89.29 CHRONIC BILATERAL LOW BACK PAIN WITH RIGHT-SIDED SCIATICA: ICD-10-CM

## 2021-02-14 DIAGNOSIS — M54.41 CHRONIC BILATERAL LOW BACK PAIN WITH RIGHT-SIDED SCIATICA: ICD-10-CM

## 2021-02-14 RX ORDER — BUTALBITAL, ACETAMINOPHEN AND CAFFEINE 50; 325; 40 MG/1; MG/1; MG/1
TABLET ORAL
Qty: 40 TABLET | Status: CANCELLED | OUTPATIENT
Start: 2021-02-14

## 2021-02-14 RX ORDER — METHOCARBAMOL 500 MG/1
TABLET, FILM COATED ORAL
Qty: 40 TABLET | Refills: 1 | Status: CANCELLED | OUTPATIENT
Start: 2021-02-14

## 2021-02-16 RX ORDER — METHOCARBAMOL 500 MG/1
TABLET, FILM COATED ORAL
Qty: 40 TABLET | Refills: 1 | Status: SHIPPED | OUTPATIENT
Start: 2021-02-16 | End: 2021-04-23

## 2021-02-16 RX ORDER — BUTALBITAL, ACETAMINOPHEN AND CAFFEINE 50; 325; 40 MG/1; MG/1; MG/1
TABLET ORAL
Qty: 40 TABLET | Refills: 0 | Status: SHIPPED | OUTPATIENT
Start: 2021-02-16 | End: 2021-03-01

## 2021-02-16 NOTE — TELEPHONE ENCOUNTER
Routing refill request to provider for review/approval because:  Drug not on the [facility] refill protocol     Tangela MAYON, RN

## 2021-02-18 ENCOUNTER — MYC REFILL (OUTPATIENT)
Dept: FAMILY MEDICINE | Facility: CLINIC | Age: 31
End: 2021-02-18

## 2021-02-18 DIAGNOSIS — F90.8 ATTENTION DEFICIT HYPERACTIVITY DISORDER (ADHD), OTHER TYPE: ICD-10-CM

## 2021-02-18 RX ORDER — DEXTROAMPHETAMINE SACCHARATE, AMPHETAMINE ASPARTATE, DEXTROAMPHETAMINE SULFATE AND AMPHETAMINE SULFATE 7.5; 7.5; 7.5; 7.5 MG/1; MG/1; MG/1; MG/1
30 TABLET ORAL 2 TIMES DAILY
Qty: 60 TABLET | Refills: 0 | OUTPATIENT
Start: 2021-02-18

## 2021-02-22 ENCOUNTER — MYC REFILL (OUTPATIENT)
Dept: FAMILY MEDICINE | Facility: CLINIC | Age: 31
End: 2021-02-22

## 2021-02-22 DIAGNOSIS — F90.8 ATTENTION DEFICIT HYPERACTIVITY DISORDER (ADHD), OTHER TYPE: ICD-10-CM

## 2021-02-22 RX ORDER — DEXTROAMPHETAMINE SACCHARATE, AMPHETAMINE ASPARTATE, DEXTROAMPHETAMINE SULFATE AND AMPHETAMINE SULFATE 7.5; 7.5; 7.5; 7.5 MG/1; MG/1; MG/1; MG/1
30 TABLET ORAL 2 TIMES DAILY
Qty: 60 TABLET | Refills: 0 | Status: SHIPPED | OUTPATIENT
Start: 2021-02-26 | End: 2021-03-22

## 2021-03-01 ENCOUNTER — VIRTUAL VISIT (OUTPATIENT)
Dept: FAMILY MEDICINE | Facility: CLINIC | Age: 31
End: 2021-03-01
Payer: COMMERCIAL

## 2021-03-01 DIAGNOSIS — G44.219 EPISODIC TENSION-TYPE HEADACHE, NOT INTRACTABLE: ICD-10-CM

## 2021-03-01 DIAGNOSIS — L72.9 SCALP CYST: Primary | ICD-10-CM

## 2021-03-01 DIAGNOSIS — F90.8 ATTENTION DEFICIT HYPERACTIVITY DISORDER (ADHD), OTHER TYPE: ICD-10-CM

## 2021-03-01 DIAGNOSIS — G47.00 INSOMNIA, UNSPECIFIED TYPE: ICD-10-CM

## 2021-03-01 PROCEDURE — 99214 OFFICE O/P EST MOD 30 MIN: CPT | Mod: 95 | Performed by: FAMILY MEDICINE

## 2021-03-01 RX ORDER — TRAZODONE HYDROCHLORIDE 50 MG/1
TABLET, FILM COATED ORAL
Qty: 30 TABLET | Refills: 1 | Status: SHIPPED | OUTPATIENT
Start: 2021-03-01 | End: 2021-07-12

## 2021-03-01 RX ORDER — BUTALBITAL, ACETAMINOPHEN AND CAFFEINE 50; 325; 40 MG/1; MG/1; MG/1
TABLET ORAL
Qty: 50 TABLET | Refills: 0 | Status: SHIPPED | OUTPATIENT
Start: 2021-03-01 | End: 2021-03-26

## 2021-03-01 ASSESSMENT — ANXIETY QUESTIONNAIRES
5. BEING SO RESTLESS THAT IT IS HARD TO SIT STILL: NOT AT ALL
1. FEELING NERVOUS, ANXIOUS, OR ON EDGE: MORE THAN HALF THE DAYS
IF YOU CHECKED OFF ANY PROBLEMS ON THIS QUESTIONNAIRE, HOW DIFFICULT HAVE THESE PROBLEMS MADE IT FOR YOU TO DO YOUR WORK, TAKE CARE OF THINGS AT HOME, OR GET ALONG WITH OTHER PEOPLE: SOMEWHAT DIFFICULT
3. WORRYING TOO MUCH ABOUT DIFFERENT THINGS: MORE THAN HALF THE DAYS
7. FEELING AFRAID AS IF SOMETHING AWFUL MIGHT HAPPEN: SEVERAL DAYS
6. BECOMING EASILY ANNOYED OR IRRITABLE: NOT AT ALL
2. NOT BEING ABLE TO STOP OR CONTROL WORRYING: NEARLY EVERY DAY
GAD7 TOTAL SCORE: 9

## 2021-03-01 ASSESSMENT — PATIENT HEALTH QUESTIONNAIRE - PHQ9
SUM OF ALL RESPONSES TO PHQ QUESTIONS 1-9: 1
5. POOR APPETITE OR OVEREATING: SEVERAL DAYS

## 2021-03-01 NOTE — Clinical Note
Patient had a work note for his sleep meds and I can't find it so I printed a letter. Please notify if that's ok.

## 2021-03-01 NOTE — PROGRESS NOTES
Ousmane is a 30 year old who is being evaluated via a billable video visit.    Follow-up adhd, anxiety, migraines, tobacco abuse, insomnia and chronic pain issues.  Home and work.   Work - driving. Taking trazodone and vistaril for sleep.   Taking before bedtime wears out of system by AM. No making snoring issues or daytime fatigue.   Rare ALCOHOL.   Trazodone helpful. adderall most days. No chest pain or shortness of breath. Son 6 year old. Girlfriend going ok.   Had cyst on head - enlarging would like surgery. Emotionally doing.   fiorcet helpful. Gabapentin prn- not sedation.   How would you like to obtain your AVS? MyChart  If the video visit is dropped, the invitation should be resent by: Text to cell phone: [phone]  Will anyone else be joining your video visit? No    Video Start Time: 4:34 PM      Subjective   Ousmane is a 30 year old who presents for the following health issues     HPI       Adhd       Review of Systems   All other ROS negative      Objective           Vitals:  No vitals were obtained today due to virtual visit.    Physical Exam   GENERAL: Healthy, alert and no distress  EYES: Eyes grossly normal to inspection.  No discharge or erythema, or obvious scleral/conjunctival abnormalities.  RESP: No audible wheeze, cough, or visible cyanosis.  No visible retractions or increased work of breathing.    SKIN: Visible skin clear. No significant rash, abnormal pigmentation or lesions.  NEURO: Cranial nerves grossly intact.  Mentation and speech appropriate for age.  PSYCH: Mentation appears normal, affect normal/bright, judgement and insight intact, normal speech and appearance well-groomed.    ASSESSMENT / PLAN:  (L72.9) Scalp cyst  (primary encounter diagnosis)  Comment: briefly discussed  Plan: GENERAL SURG ADULT REFERRAL        Patient was going to see surgery in past but needs another referral    (F90.8) Attention deficit hyperactivity disorder (ADHD), other type  Comment: stable  Plan: continue  adderal. Reveiwed risks and side effects of medication  Recheck in 6 months  Sooner if worse.     (G44.219) Episodic tension-type headache, not intractable  Comment: stable  Plan: butalbital-acetaminophen-caffeine (ESGIC)         -40 MG tablet        Prn. Reveiwed risks and side effects of medication      (G47.00) Insomnia, unspecified type  Comment: stable  Plan: traZODone (DESYREL) 50 MG tablet        Not daytime fatigue with his meds. Consider sleep specialist if worse. Avoid with ALCOHOL. Reveiwed risks and side effects of medication  If SUICIAL IDEATION OR HOMOCIDAL IDEATION OR BERNADETTE TO ER. Recheck in 6 months  Sooner if worse. Continue zoloft too.             Video-Visit Details    Type of service:  Video Visit    Video End Time:4:46 PM    Originating Location (pt. Location): Home    Distant Location (provider location):  [facility]     Platform used for Video Visit: Rachell

## 2021-03-01 NOTE — LETTER
[facility]  [address] [facility] [place] [facility] [address]  Phone: [phone]    March 1, 2021        Tejas Villalba III  [address] [facility] [address]          To whom it may concern:    RE: Tejas Villalba III    Patient was seen and treated today at our clinic. Patient has a history of insomnia and his current medications are helping him sleep at night but not making him sedated in the daytime.     Please contact me for questions or concerns.      Sincerely,        Karan Waters MD

## 2021-03-02 ENCOUNTER — TELEPHONE (OUTPATIENT)
Dept: FAMILY MEDICINE | Facility: CLINIC | Age: 31
End: 2021-03-02

## 2021-03-02 ENCOUNTER — MYC MEDICAL ADVICE (OUTPATIENT)
Dept: FAMILY MEDICINE | Facility: CLINIC | Age: 31
End: 2021-03-02

## 2021-03-02 ASSESSMENT — ANXIETY QUESTIONNAIRES: GAD7 TOTAL SCORE: 9

## 2021-03-02 NOTE — TELEPHONE ENCOUNTER
Karan Waters MD [facility]             Please help set-up general surgery with  for scalp cyst in next month.

## 2021-03-02 NOTE — TELEPHONE ENCOUNTER
Karan Waters MD  [facility]             Patient had a work note for his sleep meds and I can't find it so I printed a letter. Please notify if that's ok.      Sent [facility] message.Deirdre Munoz MA/RAHEEL

## 2021-03-03 NOTE — TELEPHONE ENCOUNTER
Spoke with patient and he is requesting we fax the not to [facility] @ [phone].      Aretha Dodson, PSC

## 2021-03-03 NOTE — TELEPHONE ENCOUNTER
Faxed note to [facility] @ [phone].Deirdre Munoz MA/RAHEEL

## 2021-03-04 ENCOUNTER — MYC MEDICAL ADVICE (OUTPATIENT)
Dept: FAMILY MEDICINE | Facility: CLINIC | Age: 31
End: 2021-03-04

## 2021-03-04 NOTE — TELEPHONE ENCOUNTER
To Provider,   Medication form received and placed in your basket for review and completion. Please route back to the team when done.  RAHEEL Posada

## 2021-03-08 ENCOUNTER — OFFICE VISIT (OUTPATIENT)
Dept: SURGERY | Facility: CLINIC | Age: 31
End: 2021-03-08
Attending: FAMILY MEDICINE
Payer: COMMERCIAL

## 2021-03-08 ENCOUNTER — MYC MEDICAL ADVICE (OUTPATIENT)
Dept: FAMILY MEDICINE | Facility: CLINIC | Age: 31
End: 2021-03-08

## 2021-03-08 VITALS
DIASTOLIC BLOOD PRESSURE: 104 MMHG | HEIGHT: 73 IN | SYSTOLIC BLOOD PRESSURE: 157 MMHG | WEIGHT: 230.4 LBS | OXYGEN SATURATION: 100 % | HEART RATE: 110 BPM | BODY MASS INDEX: 30.54 KG/M2

## 2021-03-08 DIAGNOSIS — Z20.822 ENCOUNTER FOR LABORATORY TESTING FOR COVID-19 VIRUS: ICD-10-CM

## 2021-03-08 DIAGNOSIS — L72.9 SCALP CYST: Primary | ICD-10-CM

## 2021-03-08 PROCEDURE — 99213 OFFICE O/P EST LOW 20 MIN: CPT | Performed by: SURGERY

## 2021-03-08 ASSESSMENT — MIFFLIN-ST. JEOR: SCORE: 2053.97

## 2021-03-08 NOTE — NURSING NOTE
Ousmane to follow up with Primary Care provider regarding elevated blood pressure.    GISSEL Hilton

## 2021-03-08 NOTE — LETTER
3/8/2021         RE: Tejas Villalba III  [address] [facility] [address]        Dear Colleague,    Thank you for referring your patient, Tejas Villalba III, to the [facility]. Please see a copy of my visit note below.    Patient seen in consultation for scalp cyst by Karan Waters    HPI:  Patient is a 30 year old male  with complaints of large cyst on scalp  The patient noticed the symptoms about since child  Was scheduled to get removed with Dr Hugo in 2019 but had car trouble and had to cancel  Seems like is enlarging and might pop. No drainage in past  nothing makes the episode better.  Patient has family history of similar problems    Review Of Systems    Skin: as above  Ears/Nose/Throat: negative  Respiratory: No shortness of breath, dyspnea on exertion, cough, or hemoptysis  Cardiovascular: negative  Gastrointestinal: negative  Genitourinary: negative  Musculoskeletal: back pain  Neurologic: negative  Hematologic/Lymphatic/Immunologic: negative  Endocrine: negative      Past Medical History:   Diagnosis Date     ADHD      HTN    (no htn meds)    Past Surgical History:   Procedure Laterality Date     PE TUBES         Social History     Socioeconomic History     Marital status: Single     Spouse name: Not on file     Number of children: Not on file     Years of education: Not on file     Highest education level: Not on file   Occupational History     Not on file   Social Needs     Financial resource strain: Not on file     Food insecurity     Worry: Not on file     Inability: Not on file     Transportation needs     Medical: Not on file     Non-medical: Not on file   Tobacco Use     Smoking status: Current Every Day Smoker     Packs/day: 0.50     Years: 10.00     Pack years: 5.00     Last attempt to quit: 8/18/2010     Years since quitting: 10.5     Smokeless tobacco: Never Used   Substance and Sexual Activity     Alcohol use: Yes     Comment: OCC     Drug  use: No     Sexual activity: Yes     Partners: Female   Lifestyle     Physical activity     Days per week: Not on file     Minutes per session: Not on file     Stress: Not on file   Relationships     Social connections     Talks on phone: Not on file     Gets together: Not on file     Attends [employer] service: Not on file     Active member of club or organization: Not on file     Attends meetings of clubs or organizations: Not on file     Relationship status: Not on file     Intimate partner violence     Fear of current or ex partner: Not on file     Emotionally abused: Not on file     Physically abused: Not on file     Forced sexual activity: Not on file   Other Topics Concern     Parent/sibling w/ CABG, MI or angioplasty before 65F 55M? Not Asked   Social History Narrative     Not on file       Current Outpatient Medications   Medication Sig Dispense Refill     amphetamine-dextroamphetamine (ADDERALL) 30 MG tablet Take 1 tablet (30 mg) by mouth 2 times daily Stop the adderall XR 60 tablet 0     butalbital-acetaminophen-caffeine (ESGIC) -40 MG tablet TAKE 1 TABLET BY MOUTH EVERY 6 HOURS AS NEEDED. AVOID WITH KLONOPIN/ CLONAZEPAM 50 tablet 0     cyclobenzaprine (FLEXERIL) 10 MG tablet TAKE 1/2 TO 1 TABLET(5 TO 10 MG) BY MOUTH EVERY NIGHT AS NEEDED FOR MUSCLE SPASMS 30 tablet 1     gabapentin (NEURONTIN) 300 MG capsule TAKE 1 CAPSULE(300 MG) BY MOUTH EVERY NIGHT AS NEEDED FOR SLEEP OR BACK PAIN 90 capsule 0     hydrOXYzine (VISTARIL) 25 MG capsule TAKE 1 TO 2 CAPSULES BY MOUTH AT BEDTIME AS NEEDED FOR SLEEP OR ANXIETY 30 capsule 1     IBUPROFEN PO Take 600 mg by mouth 2 times daily As needed       methocarbamol (ROBAXIN) 500 MG tablet TAKE 1-2 TABLETS BY MOUTH DAILY AS NEEDED FOR MUSCLE SPASM. FLEXERIL IS FOR BEDTIME. 40 tablet 1     nicotine (NICORETTE) 2 MG gum PLACE 1 EACH INSIDE CHEEK AS NEEDED FOR SMOKING CESSATION. 30 each 3     sertraline (ZOLOFT) 100 MG tablet TAKE 1.5 TABLETS FOR DEPRESSION AND  "ANXIETY 135 tablet 0     traZODone (DESYREL) 50 MG tablet TAKE 1 TABLET(50 MG) BY MOUTH EVERY NIGHT AS NEEDED FOR SLEEP. DO NOT TAKE WITH FLEXERIL 30 tablet 1       Medications and history reviewed    Physical exam:  Vitals: BP (!) 157/104   Pulse 110   Ht 1.854 m (6' 1\")   Wt 104.5 kg (230 lb 6.4 oz)   SpO2 100%   BMI 30.40 kg/m    BMI= Body mass index is 30.4 kg/m .    Constitutional: healthy, alert and no distress  Head: positive findings: about 3 cm cyst just to right of vertex. Does not appear infected. The upper part is thinned and protrudes above the cyst like a nipple. Not tender  Cardiovascular: negative, PMI normal. No lifts, heaves, or thrills. RRR. No murmurs, clicks gallops or rub  Respiratory: negative, Percussion normal. Good diaphragmatic excursion. Lungs clear  Gastrointestinal: Abdomen soft, non-tender. BS normal. No masses, organomegaly      Assessment:     ICD-10-CM    1. Scalp cyst  L72.9 GENERAL SURG ADULT REFERRAL     Case Request: EXCISION, LESION, BENIGN, SCALP, NECK, GENITALIA, HAND, OR FOOT, 3.1 CM TO 4.0 CM IN DIAMETER   2. Encounter for laboratory testing for COVID-19 virus  Z20.822 Asymptomatic COVID-19 Virus (Coronavirus) by PCR     Plan: Large scalp cyst therefore recommended excision in the operating room as opposed to in clinic.  Can still get done under local though did mention that sedation is possible if he did not think it be able to tolerate while awake.  Patient agreeable with local at this time.  Will work on scheduling the procedure.  Ousmane to follow up with Primary Care provider regarding elevated blood pressure.      Marcin Her MD        Again, thank you for allowing me to participate in the care of your patient.        Sincerely,        Marcin Her MD    "

## 2021-03-08 NOTE — PROGRESS NOTES
Patient seen in consultation for scalp cyst by Karan Waters    HPI:  Patient is a 30 year old male  with complaints of large cyst on scalp  The patient noticed the symptoms about since child  Was scheduled to get removed with Dr Hugo in 2019 but had car trouble and had to cancel  Seems like is enlarging and might pop. No drainage in past  nothing makes the episode better.  Patient has family history of similar problems    Review Of Systems    Skin: as above  Ears/Nose/Throat: negative  Respiratory: No shortness of breath, dyspnea on exertion, cough, or hemoptysis  Cardiovascular: negative  Gastrointestinal: negative  Genitourinary: negative  Musculoskeletal: back pain  Neurologic: negative  Hematologic/Lymphatic/Immunologic: negative  Endocrine: negative      Past Medical History:   Diagnosis Date     ADHD      HTN    (no htn meds)    Past Surgical History:   Procedure Laterality Date     PE TUBES         Social History     Socioeconomic History     Marital status: Single     Spouse name: Not on file     Number of children: Not on file     Years of education: Not on file     Highest education level: Not on file   Occupational History     Not on file   Social Needs     Financial resource strain: Not on file     Food insecurity     Worry: Not on file     Inability: Not on file     Transportation needs     Medical: Not on file     Non-medical: Not on file   Tobacco Use     Smoking status: Current Every Day Smoker     Packs/day: 0.50     Years: 10.00     Pack years: 5.00     Last attempt to quit: 8/18/2010     Years since quitting: 10.5     Smokeless tobacco: Never Used   Substance and Sexual Activity     Alcohol use: Yes     Comment: OCC     Drug use: No     Sexual activity: Yes     Partners: Female   Lifestyle     Physical activity     Days per week: Not on file     Minutes per session: Not on file     Stress: Not on file   Relationships     Social connections     Talks on phone: Not on file     Gets together:  "Not on file     Attends [employer] service: Not on file     Active member of club or organization: Not on file     Attends meetings of clubs or organizations: Not on file     Relationship status: Not on file     Intimate partner violence     Fear of current or ex partner: Not on file     Emotionally abused: Not on file     Physically abused: Not on file     Forced sexual activity: Not on file   Other Topics Concern     Parent/sibling w/ CABG, MI or angioplasty before 65F 55M? Not Asked   Social History Narrative     Not on file       Current Outpatient Medications   Medication Sig Dispense Refill     amphetamine-dextroamphetamine (ADDERALL) 30 MG tablet Take 1 tablet (30 mg) by mouth 2 times daily Stop the adderall XR 60 tablet 0     butalbital-acetaminophen-caffeine (ESGIC) -40 MG tablet TAKE 1 TABLET BY MOUTH EVERY 6 HOURS AS NEEDED. AVOID WITH KLONOPIN/ CLONAZEPAM 50 tablet 0     cyclobenzaprine (FLEXERIL) 10 MG tablet TAKE 1/2 TO 1 TABLET(5 TO 10 MG) BY MOUTH EVERY NIGHT AS NEEDED FOR MUSCLE SPASMS 30 tablet 1     gabapentin (NEURONTIN) 300 MG capsule TAKE 1 CAPSULE(300 MG) BY MOUTH EVERY NIGHT AS NEEDED FOR SLEEP OR BACK PAIN 90 capsule 0     hydrOXYzine (VISTARIL) 25 MG capsule TAKE 1 TO 2 CAPSULES BY MOUTH AT BEDTIME AS NEEDED FOR SLEEP OR ANXIETY 30 capsule 1     IBUPROFEN PO Take 600 mg by mouth 2 times daily As needed       methocarbamol (ROBAXIN) 500 MG tablet TAKE 1-2 TABLETS BY MOUTH DAILY AS NEEDED FOR MUSCLE SPASM. FLEXERIL IS FOR BEDTIME. 40 tablet 1     nicotine (NICORETTE) 2 MG gum PLACE 1 EACH INSIDE CHEEK AS NEEDED FOR SMOKING CESSATION. 30 each 3     sertraline (ZOLOFT) 100 MG tablet TAKE 1.5 TABLETS FOR DEPRESSION AND ANXIETY 135 tablet 0     traZODone (DESYREL) 50 MG tablet TAKE 1 TABLET(50 MG) BY MOUTH EVERY NIGHT AS NEEDED FOR SLEEP. DO NOT TAKE WITH FLEXERIL 30 tablet 1       Medications and history reviewed    Physical exam:  Vitals: BP (!) 157/104   Pulse 110   Ht 1.854 m (6' 1\")  "  Wt 104.5 kg (230 lb 6.4 oz)   SpO2 100%   BMI 30.40 kg/m    BMI= Body mass index is 30.4 kg/m .    Constitutional: healthy, alert and no distress  Head: positive findings: about 3 cm cyst just to right of vertex. Does not appear infected. The upper part is thinned and protrudes above the cyst like a nipple. Not tender  Cardiovascular: negative, PMI normal. No lifts, heaves, or thrills. RRR. No murmurs, clicks gallops or rub  Respiratory: negative, Percussion normal. Good diaphragmatic excursion. Lungs clear  Gastrointestinal: Abdomen soft, non-tender. BS normal. No masses, organomegaly      Assessment:     ICD-10-CM    1. Scalp cyst  L72.9 GENERAL SURG ADULT REFERRAL     Case Request: EXCISION, LESION, BENIGN, SCALP, NECK, GENITALIA, HAND, OR FOOT, 3.1 CM TO 4.0 CM IN DIAMETER   2. Encounter for laboratory testing for COVID-19 virus  Z20.822 Asymptomatic COVID-19 Virus (Coronavirus) by PCR     Plan: Large scalp cyst therefore recommended excision in the operating room as opposed to in clinic.  Can still get done under local though did mention that sedation is possible if he did not think it be able to tolerate while awake.  Patient agreeable with local at this time.  Will work on scheduling the procedure.  Ousmane to follow up with Primary Care provider regarding elevated blood pressure.      Marcin Her MD

## 2021-03-09 ENCOUNTER — TELEPHONE (OUTPATIENT)
Dept: SURGERY | Facility: CLINIC | Age: 31
End: 2021-03-09

## 2021-03-09 DIAGNOSIS — F41.1 GENERALIZED ANXIETY DISORDER: ICD-10-CM

## 2021-03-09 DIAGNOSIS — G47.00 INSOMNIA, UNSPECIFIED TYPE: ICD-10-CM

## 2021-03-10 RX ORDER — HYDROXYZINE PAMOATE 25 MG/1
CAPSULE ORAL
Qty: 30 CAPSULE | Refills: 1 | Status: SHIPPED | OUTPATIENT
Start: 2021-03-10 | End: 2021-08-12

## 2021-03-22 ENCOUNTER — MYC REFILL (OUTPATIENT)
Dept: FAMILY MEDICINE | Facility: CLINIC | Age: 31
End: 2021-03-22

## 2021-03-22 DIAGNOSIS — F90.8 ATTENTION DEFICIT HYPERACTIVITY DISORDER (ADHD), OTHER TYPE: ICD-10-CM

## 2021-03-22 RX ORDER — DEXTROAMPHETAMINE SACCHARATE, AMPHETAMINE ASPARTATE, DEXTROAMPHETAMINE SULFATE AND AMPHETAMINE SULFATE 7.5; 7.5; 7.5; 7.5 MG/1; MG/1; MG/1; MG/1
30 TABLET ORAL 2 TIMES DAILY
Qty: 60 TABLET | Refills: 0 | Status: SHIPPED | OUTPATIENT
Start: 2021-03-25 | End: 2021-04-20

## 2021-03-22 NOTE — TELEPHONE ENCOUNTER
adderall refill request  Last Refill: 2/26/21  #60 (BID dosing)  Virtual visit 3/1/21 for ADHD with Dr. Karan Waters  To MD to advise; Med not on RN refill protocol.

## 2021-03-26 ENCOUNTER — MYC MEDICAL ADVICE (OUTPATIENT)
Dept: SURGERY | Facility: CLINIC | Age: 31
End: 2021-03-26

## 2021-03-26 DIAGNOSIS — G44.219 EPISODIC TENSION-TYPE HEADACHE, NOT INTRACTABLE: ICD-10-CM

## 2021-03-26 PROBLEM — L72.9 SCALP CYST: Status: ACTIVE | Noted: 2021-03-26

## 2021-03-26 RX ORDER — BUTALBITAL, ACETAMINOPHEN AND CAFFEINE 50; 325; 40 MG/1; MG/1; MG/1
TABLET ORAL
Qty: 50 TABLET | Refills: 0 | Status: SHIPPED | OUTPATIENT
Start: 2021-03-26 | End: 2021-04-20

## 2021-03-26 NOTE — TELEPHONE ENCOUNTER
Type of surgery: Excision lesion benign scalp neck genitalia hand or foot 3.1 cm to 4.0 cm in diameter  CPT 63640  Scalp cyst L72.9  Location of surgery: [facility]  Date and time of surgery: 05/05/2021  Surgeon: Arden  Pre-Op Appt Date: none local  Post-Op Appt Date: 05/24/2021   Packet sent out: Yes  Pre-cert/Authorization completed:  No prior auth needed per [employer] online.  Date: 3-26-21    Lisa Yanez  Prior Authorization Dept  [phone]

## 2021-03-26 NOTE — TELEPHONE ENCOUNTER
Reason for Call:  Other appointment    Detailed comments: called back, I tried to warm transfer to number listed and it just kept ringing. Please call patient back to set up. Will be waiting for call.    Phone Number Patient can be reached at: Cell number on file:    Telephone Information:   Mobile [phone]       Best Time: Any    Can we leave a detailed message on this number? No - not setup    Call taken on 3/26/2021 at 2:10 PM by Kenya Bello

## 2021-03-29 DIAGNOSIS — F32.2 MAJOR DEPRESSIVE DISORDER, SINGLE EPISODE, SEVERE WITHOUT PSYCHOTIC FEATURES (H): ICD-10-CM

## 2021-03-29 RX ORDER — SERTRALINE HYDROCHLORIDE 100 MG/1
TABLET, FILM COATED ORAL
Qty: 135 TABLET | Refills: 1 | Status: SHIPPED | OUTPATIENT
Start: 2021-03-29 | End: 2021-10-14

## 2021-04-17 ENCOUNTER — HEALTH MAINTENANCE LETTER (OUTPATIENT)
Age: 31
End: 2021-04-17

## 2021-04-20 ENCOUNTER — MYC REFILL (OUTPATIENT)
Dept: FAMILY MEDICINE | Facility: CLINIC | Age: 31
End: 2021-04-20

## 2021-04-20 DIAGNOSIS — F90.8 ATTENTION DEFICIT HYPERACTIVITY DISORDER (ADHD), OTHER TYPE: ICD-10-CM

## 2021-04-20 DIAGNOSIS — G44.219 EPISODIC TENSION-TYPE HEADACHE, NOT INTRACTABLE: ICD-10-CM

## 2021-04-21 RX ORDER — DEXTROAMPHETAMINE SACCHARATE, AMPHETAMINE ASPARTATE, DEXTROAMPHETAMINE SULFATE AND AMPHETAMINE SULFATE 7.5; 7.5; 7.5; 7.5 MG/1; MG/1; MG/1; MG/1
30 TABLET ORAL 2 TIMES DAILY
Qty: 60 TABLET | Refills: 0 | Status: SHIPPED | OUTPATIENT
Start: 2021-04-21 | End: 2021-05-17

## 2021-04-21 RX ORDER — BUTALBITAL, ACETAMINOPHEN AND CAFFEINE 50; 325; 40 MG/1; MG/1; MG/1
TABLET ORAL
Qty: 50 TABLET | Refills: 0 | Status: SHIPPED | OUTPATIENT
Start: 2021-04-21 | End: 2021-05-17

## 2021-04-21 NOTE — TELEPHONE ENCOUNTER
Routing refill request to provider for review/approval because:  Drug not on the [facility] refill protocol   Shama Robrets BSN, RN

## 2021-04-23 DIAGNOSIS — Z72.0 TOBACCO ABUSE: ICD-10-CM

## 2021-04-23 DIAGNOSIS — G89.29 CHRONIC BILATERAL LOW BACK PAIN WITH RIGHT-SIDED SCIATICA: ICD-10-CM

## 2021-04-23 DIAGNOSIS — M54.41 CHRONIC BILATERAL LOW BACK PAIN WITH RIGHT-SIDED SCIATICA: ICD-10-CM

## 2021-04-23 DIAGNOSIS — Z11.59 ENCOUNTER FOR SCREENING FOR OTHER VIRAL DISEASES: ICD-10-CM

## 2021-04-23 RX ORDER — METHOCARBAMOL 500 MG/1
TABLET, FILM COATED ORAL
Qty: 40 TABLET | Refills: 1 | Status: SHIPPED | OUTPATIENT
Start: 2021-04-23 | End: 2021-07-12

## 2021-04-23 RX ORDER — CYCLOBENZAPRINE HCL 10 MG
TABLET ORAL
Qty: 30 TABLET | Refills: 1 | Status: SHIPPED | OUTPATIENT
Start: 2021-04-23 | End: 2021-06-14

## 2021-04-28 ENCOUNTER — TELEPHONE (OUTPATIENT)
Dept: FAMILY MEDICINE | Facility: CLINIC | Age: 31
End: 2021-04-28

## 2021-04-28 NOTE — TELEPHONE ENCOUNTER
Reason for Call:  Form, our goal is to have forms completed with 72 hours, however, some forms may require a visit or additional information.    Type of letter, form or note:  medical    Who is the form from?: Patient    Where did the form come from: form was faxed in    What clinic location was the form placed at?: [place]    Where the form was placed: [employer]    What number is listed as a contact on the form?: [phone]       Additional comments: Fax to [phone]    Call taken on 4/28/2021 at 1:13 PM by Michael Davis

## 2021-04-30 NOTE — TELEPHONE ENCOUNTER
Faxed completed form to [employer] @ [phone].Deirdre Munoz MA/RAHEEL

## 2021-05-02 DIAGNOSIS — Z11.59 ENCOUNTER FOR SCREENING FOR OTHER VIRAL DISEASES: ICD-10-CM

## 2021-05-02 LAB
SARS-COV-2 RNA RESP QL NAA+PROBE: NORMAL
SPECIMEN SOURCE: NORMAL

## 2021-05-02 PROCEDURE — U0005 INFEC AGEN DETEC AMPLI PROBE: HCPCS | Performed by: SURGERY

## 2021-05-02 PROCEDURE — U0003 INFECTIOUS AGENT DETECTION BY NUCLEIC ACID (DNA OR RNA); SEVERE ACUTE RESPIRATORY SYNDROME CORONAVIRUS 2 (SARS-COV-2) (CORONAVIRUS DISEASE [COVID-19]), AMPLIFIED PROBE TECHNIQUE, MAKING USE OF HIGH THROUGHPUT TECHNOLOGIES AS DESCRIBED BY CMS-2020-01-R: HCPCS | Performed by: SURGERY

## 2021-05-03 LAB
LABORATORY COMMENT REPORT: NORMAL
SARS-COV-2 RNA RESP QL NAA+PROBE: NEGATIVE
SPECIMEN SOURCE: NORMAL

## 2021-05-05 ENCOUNTER — HOSPITAL ENCOUNTER (OUTPATIENT)
Facility: AMBULATORY SURGERY CENTER | Age: 31
Discharge: HOME | End: 2021-05-05
Attending: SURGERY | Admitting: SURGERY
Payer: COMMERCIAL

## 2021-05-05 VITALS
DIASTOLIC BLOOD PRESSURE: 94 MMHG | OXYGEN SATURATION: 98 % | SYSTOLIC BLOOD PRESSURE: 146 MMHG | RESPIRATION RATE: 20 BRPM | TEMPERATURE: 98.3 F

## 2021-05-05 DIAGNOSIS — L72.9 SCALP CYST: ICD-10-CM

## 2021-05-05 PROCEDURE — G8907 PT DOC NO EVENTS ON DISCHARG: HCPCS

## 2021-05-05 PROCEDURE — G8918 PT W/O PREOP ORDER IV AB PRO: HCPCS

## 2021-05-05 PROCEDURE — 12032 INTMD RPR S/A/T/EXT 2.6-7.5: CPT

## 2021-05-05 PROCEDURE — 88304 TISSUE EXAM BY PATHOLOGIST: CPT | Performed by: DERMATOLOGY

## 2021-05-05 PROCEDURE — 12031 INTMD RPR S/A/T/EXT 2.5 CM/<: CPT | Performed by: SURGERY

## 2021-05-05 PROCEDURE — 11402 EXC TR-EXT B9+MARG 1.1-2 CM: CPT | Mod: 51 | Performed by: SURGERY

## 2021-05-05 PROCEDURE — 11422 EXC H-F-NK-SP B9+MARG 1.1-2: CPT

## 2021-05-05 RX ORDER — CALCIUM CARBONATE 500(1250)
500 TABLET,CHEWABLE ORAL
COMMUNITY

## 2021-05-05 RX ORDER — BUPIVACAINE HYDROCHLORIDE AND EPINEPHRINE 5; 5 MG/ML; UG/ML
INJECTION, SOLUTION PERINEURAL PRN
Status: DISCONTINUED | OUTPATIENT
Start: 2021-05-05 | End: 2021-05-05 | Stop reason: HOSPADM

## 2021-05-05 NOTE — OR NURSING
Pt had local injection for scalp cyst removal. Asked about pain medicine in post procedure: Dr. Her stated pt may use Ibuprofen and/or Tylenol for pain. No scripts ordered. Pain management plan reviewed with pt to stay ahead of the incisional discomfort. Pt verbalized understanding. Dr. Her said to contact the clinic if site becomes very uncomfortable. Reviewed incisional/site care with pt.

## 2021-05-05 NOTE — OP NOTE
Date of Service: 5/5/2021     STAFF SURGEON:  Marcin Her MD     ASSISTANT:  None.     PREOPERATIVE DIAGNOSIS:   Scalp cyst     POSTOPERATIVE DIAGNOSIS:  Same     NAME OF PROCEDURE(S):   Excision of posterior scalp cyst     INDICATIONS FOR PROCEDURE:  The patient is a 30-year-old male with long history of cyst on the back of his head.  It has enlarged over time and he desired removal.  I offered excision in the OR under local.  Risks, benefits, alternatives discussed and consent obtained.     EBL: 20 cc    ANESTHESIA: Local-half percent Marcaine with epinephrine 1-200,000    COMPLICATIONS: None     DRAINS:  None.     SPECIMENS:   Scalp cyst     IMPLANTS: None     OPERATIVE FINDINGS:   Posterior midline scalp cyst measured 2 cm in diameter     PROCEDURE DETAIL:  Following consent, the patient was brought from the preoperative holding area to the operating suite and laid in supine position.  Head of the bed was slightly elevated and the area around the cyst shaved, prepped, and draped in usual fashion.  Timeout performed.  I began with injecting local around the cyst.  I then made an approximately 4 x 1 cm elliptical incision transversely over the cyst with #15 blade.  I then continued with sharp dissection first along the superior portion of the cyst following the cyst wall around and then likewise on the inferior portion.  Hemostasis obtained with electrocautery.  Once excised and measured the cyst in about 2 cm diameter.  I then closed the wound in multiple layers using some interrupted 3-0 Vicryl sutures for the deeper tissues followed by running 4-0 Monocryl for skin.  The incision was then covered with skin glue.  Patient taught procedure well was discharged from the operating room with plans to then discharge home.           Marcin Her MD

## 2021-05-07 LAB — COPATH REPORT: NORMAL

## 2021-05-10 ENCOUNTER — TELEPHONE (OUTPATIENT)
Dept: SURGERY | Facility: CLINIC | Age: 31
End: 2021-05-10

## 2021-05-10 DIAGNOSIS — L72.11 PILAR CYST: Primary | ICD-10-CM

## 2021-05-10 RX ORDER — OXYCODONE AND ACETAMINOPHEN 5; 325 MG/1; MG/1
1 TABLET ORAL EVERY 6 HOURS PRN
Qty: 5 TABLET | Refills: 0 | Status: SHIPPED | OUTPATIENT
Start: 2021-05-10 | End: 2021-05-13

## 2021-05-10 NOTE — TELEPHONE ENCOUNTER
Can try to tape a gauze or other bandage on for the drainage though can be difficult on the head with hair etc. Sometimes a hat can hold some gauze in place well enough. Will send a script for a few pain pills

## 2021-05-10 NOTE — TELEPHONE ENCOUNTER
Called Patient in regards to Pathology results from procedure on 05/04  Scalp cyst    He is stating the incision has jessica weeping and leaving some yellow discharge on gauze and still feels tender.    He is also requesting a prescription for pain medication.  He stated he cannot sleep at night and Tylenol/ibuprofen is not helping    Routing to Provider for review    Diana Taveras MA

## 2021-05-17 ENCOUNTER — MYC REFILL (OUTPATIENT)
Dept: FAMILY MEDICINE | Facility: CLINIC | Age: 31
End: 2021-05-17

## 2021-05-17 DIAGNOSIS — F90.8 ATTENTION DEFICIT HYPERACTIVITY DISORDER (ADHD), OTHER TYPE: ICD-10-CM

## 2021-05-17 DIAGNOSIS — G44.219 EPISODIC TENSION-TYPE HEADACHE, NOT INTRACTABLE: ICD-10-CM

## 2021-05-17 RX ORDER — DEXTROAMPHETAMINE SACCHARATE, AMPHETAMINE ASPARTATE, DEXTROAMPHETAMINE SULFATE AND AMPHETAMINE SULFATE 7.5; 7.5; 7.5; 7.5 MG/1; MG/1; MG/1; MG/1
30 TABLET ORAL 2 TIMES DAILY
Qty: 60 TABLET | Refills: 0 | Status: SHIPPED | OUTPATIENT
Start: 2021-05-17 | End: 2021-06-14

## 2021-05-17 RX ORDER — BUTALBITAL, ACETAMINOPHEN AND CAFFEINE 50; 325; 40 MG/1; MG/1; MG/1
TABLET ORAL
Qty: 50 TABLET | Refills: 0 | Status: SHIPPED | OUTPATIENT
Start: 2021-05-17 | End: 2021-06-14

## 2021-05-24 ENCOUNTER — OFFICE VISIT (OUTPATIENT)
Dept: SURGERY | Facility: CLINIC | Age: 31
End: 2021-05-24
Payer: COMMERCIAL

## 2021-05-24 VITALS
BODY MASS INDEX: 31.14 KG/M2 | HEART RATE: 62 BPM | SYSTOLIC BLOOD PRESSURE: 133 MMHG | WEIGHT: 236 LBS | DIASTOLIC BLOOD PRESSURE: 82 MMHG

## 2021-05-24 DIAGNOSIS — L72.11 PILAR CYST: Primary | ICD-10-CM

## 2021-05-24 DIAGNOSIS — Z98.890 POSTOPERATIVE STATE: ICD-10-CM

## 2021-05-24 PROCEDURE — 99024 POSTOP FOLLOW-UP VISIT: CPT | Performed by: SURGERY

## 2021-05-24 NOTE — LETTER
5/24/2021         RE: Tejas Villalba III  [address] [facility] [address]        Dear Colleague,    Thank you for referring your patient, Tejsa Villalba III, to the [facility]. Please see a copy of my visit note below.    General Surgery Post Op    Pt returns for follow up visit s/p excision of scalp cyst on 5/5/2021.    Patient has been doing well, initially had some drainage from the wound that has since stopped.  Seems to have otherwise healed up well with no issues    Physical exam: Vitals: /82   Pulse 62   Wt 107 kg (236 lb)   BMI 31.14 kg/m    BMI= Body mass index is 31.14 kg/m .    Exam:  Constitutional: healthy, alert and no distress  Scalp incision healing well no signs of infection.  Glue has come off.  There is some regrowth of hair in the area    Path:  FINAL DIAGNOSIS:   Skin, scalp cyst:   - Pilar cyst    Assessment:     ICD-10-CM    1. Pilar cyst  L72.11    2. Postoperative state  Z98.890      Plan: Healing up well from the excision of the large pilar cyst.  Nothing concerning on pathology.  Follow-up as needed.    Marcin Her MD          Again, thank you for allowing me to participate in the care of your patient.        Sincerely,        Marcin Her MD

## 2021-05-24 NOTE — PROGRESS NOTES
General Surgery Post Op    Pt returns for follow up visit s/p excision of scalp cyst on 5/5/2021.    Patient has been doing well, initially had some drainage from the wound that has since stopped.  Seems to have otherwise healed up well with no issues    Physical exam: Vitals: /82   Pulse 62   Wt 107 kg (236 lb)   BMI 31.14 kg/m    BMI= Body mass index is 31.14 kg/m .    Exam:  Constitutional: healthy, alert and no distress  Scalp incision healing well no signs of infection.  Glue has come off.  There is some regrowth of hair in the area    Path:  FINAL DIAGNOSIS:   Skin, scalp cyst:   - Pilar cyst    Assessment:     ICD-10-CM    1. Pilar cyst  L72.11    2. Postoperative state  Z98.890      Plan: Healing up well from the excision of the large pilar cyst.  Nothing concerning on pathology.  Follow-up as needed.    Marcin Her MD

## 2021-06-14 ENCOUNTER — MYC REFILL (OUTPATIENT)
Dept: FAMILY MEDICINE | Facility: CLINIC | Age: 31
End: 2021-06-14

## 2021-06-14 DIAGNOSIS — G89.29 CHRONIC BILATERAL LOW BACK PAIN WITH RIGHT-SIDED SCIATICA: ICD-10-CM

## 2021-06-14 DIAGNOSIS — M54.41 CHRONIC BILATERAL LOW BACK PAIN WITH RIGHT-SIDED SCIATICA: ICD-10-CM

## 2021-06-14 DIAGNOSIS — F90.8 ATTENTION DEFICIT HYPERACTIVITY DISORDER (ADHD), OTHER TYPE: ICD-10-CM

## 2021-06-14 DIAGNOSIS — G44.219 EPISODIC TENSION-TYPE HEADACHE, NOT INTRACTABLE: ICD-10-CM

## 2021-06-14 RX ORDER — DEXTROAMPHETAMINE SACCHARATE, AMPHETAMINE ASPARTATE, DEXTROAMPHETAMINE SULFATE AND AMPHETAMINE SULFATE 7.5; 7.5; 7.5; 7.5 MG/1; MG/1; MG/1; MG/1
30 TABLET ORAL 2 TIMES DAILY
Qty: 60 TABLET | Refills: 0 | Status: SHIPPED | OUTPATIENT
Start: 2021-06-16 | End: 2021-07-12

## 2021-06-14 RX ORDER — BUTALBITAL, ACETAMINOPHEN AND CAFFEINE 50; 325; 40 MG/1; MG/1; MG/1
TABLET ORAL
Qty: 50 TABLET | Refills: 0 | Status: SHIPPED | OUTPATIENT
Start: 2021-06-16 | End: 2021-07-12

## 2021-06-14 RX ORDER — CYCLOBENZAPRINE HCL 10 MG
TABLET ORAL
Qty: 30 TABLET | Refills: 1 | Status: SHIPPED | OUTPATIENT
Start: 2021-06-14 | End: 2021-08-12

## 2021-07-12 ENCOUNTER — MYC REFILL (OUTPATIENT)
Dept: FAMILY MEDICINE | Facility: CLINIC | Age: 31
End: 2021-07-12

## 2021-07-12 DIAGNOSIS — Z72.0 TOBACCO ABUSE: ICD-10-CM

## 2021-07-12 DIAGNOSIS — F90.8 ATTENTION DEFICIT HYPERACTIVITY DISORDER (ADHD), OTHER TYPE: ICD-10-CM

## 2021-07-12 DIAGNOSIS — M54.41 CHRONIC BILATERAL LOW BACK PAIN WITH RIGHT-SIDED SCIATICA: ICD-10-CM

## 2021-07-12 DIAGNOSIS — G47.00 INSOMNIA, UNSPECIFIED TYPE: ICD-10-CM

## 2021-07-12 DIAGNOSIS — G44.219 EPISODIC TENSION-TYPE HEADACHE, NOT INTRACTABLE: ICD-10-CM

## 2021-07-12 DIAGNOSIS — G89.29 CHRONIC BILATERAL LOW BACK PAIN WITH RIGHT-SIDED SCIATICA: ICD-10-CM

## 2021-07-12 RX ORDER — METHOCARBAMOL 500 MG/1
TABLET, FILM COATED ORAL
Qty: 40 TABLET | Refills: 1 | Status: SHIPPED | OUTPATIENT
Start: 2021-07-12 | End: 2021-09-13

## 2021-07-12 RX ORDER — TRAZODONE HYDROCHLORIDE 50 MG/1
TABLET, FILM COATED ORAL
Qty: 30 TABLET | Refills: 1 | Status: SHIPPED | OUTPATIENT
Start: 2021-07-12 | End: 2021-10-14

## 2021-07-13 DIAGNOSIS — G44.219 EPISODIC TENSION-TYPE HEADACHE, NOT INTRACTABLE: ICD-10-CM

## 2021-07-13 NOTE — TELEPHONE ENCOUNTER
Patient called to check on status of refills below.  Stated he already was notified of his other refills given yesterday, was wondering why below refills had not been addressed at same time.  Informed patient of 48 hour window to address refill requests. Advised would forward on to provider today to review.      Routing refill request to provider for review/approval because:  Drugs not on the [facility] refill protocol       Mine Zambrano RN  [facility]

## 2021-07-14 RX ORDER — DEXTROAMPHETAMINE SACCHARATE, AMPHETAMINE ASPARTATE, DEXTROAMPHETAMINE SULFATE AND AMPHETAMINE SULFATE 7.5; 7.5; 7.5; 7.5 MG/1; MG/1; MG/1; MG/1
30 TABLET ORAL 2 TIMES DAILY
Qty: 60 TABLET | Refills: 0 | Status: SHIPPED | OUTPATIENT
Start: 2021-07-15 | End: 2021-08-09

## 2021-07-14 RX ORDER — BUTALBITAL, ACETAMINOPHEN AND CAFFEINE 50; 325; 40 MG/1; MG/1; MG/1
TABLET ORAL
Qty: 50 TABLET | OUTPATIENT
Start: 2021-07-14

## 2021-07-14 RX ORDER — BUTALBITAL, ACETAMINOPHEN AND CAFFEINE 50; 325; 40 MG/1; MG/1; MG/1
TABLET ORAL
Qty: 50 TABLET | Refills: 0 | Status: SHIPPED | OUTPATIENT
Start: 2021-07-15 | End: 2021-08-09

## 2021-08-09 ENCOUNTER — MYC REFILL (OUTPATIENT)
Dept: FAMILY MEDICINE | Facility: CLINIC | Age: 31
End: 2021-08-09

## 2021-08-09 DIAGNOSIS — G44.219 EPISODIC TENSION-TYPE HEADACHE, NOT INTRACTABLE: ICD-10-CM

## 2021-08-09 DIAGNOSIS — F90.8 ATTENTION DEFICIT HYPERACTIVITY DISORDER (ADHD), OTHER TYPE: ICD-10-CM

## 2021-08-10 RX ORDER — BUTALBITAL, ACETAMINOPHEN AND CAFFEINE 50; 325; 40 MG/1; MG/1; MG/1
TABLET ORAL
Qty: 50 TABLET | Refills: 0 | Status: SHIPPED | OUTPATIENT
Start: 2021-08-14 | End: 2021-09-13

## 2021-08-10 RX ORDER — DEXTROAMPHETAMINE SACCHARATE, AMPHETAMINE ASPARTATE, DEXTROAMPHETAMINE SULFATE AND AMPHETAMINE SULFATE 7.5; 7.5; 7.5; 7.5 MG/1; MG/1; MG/1; MG/1
30 TABLET ORAL 2 TIMES DAILY
Qty: 60 TABLET | Refills: 0 | Status: SHIPPED | OUTPATIENT
Start: 2021-08-14 | End: 2021-09-13

## 2021-08-12 DIAGNOSIS — G89.29 CHRONIC BILATERAL LOW BACK PAIN WITH RIGHT-SIDED SCIATICA: ICD-10-CM

## 2021-08-12 DIAGNOSIS — M54.41 CHRONIC BILATERAL LOW BACK PAIN WITH RIGHT-SIDED SCIATICA: ICD-10-CM

## 2021-08-12 DIAGNOSIS — G47.00 INSOMNIA, UNSPECIFIED TYPE: ICD-10-CM

## 2021-08-12 DIAGNOSIS — F41.1 GENERALIZED ANXIETY DISORDER: ICD-10-CM

## 2021-08-12 RX ORDER — HYDROXYZINE PAMOATE 25 MG/1
CAPSULE ORAL
Qty: 30 CAPSULE | Refills: 1 | Status: SHIPPED | OUTPATIENT
Start: 2021-08-12

## 2021-08-12 RX ORDER — CYCLOBENZAPRINE HCL 10 MG
TABLET ORAL
Qty: 30 TABLET | Refills: 1 | Status: SHIPPED | OUTPATIENT
Start: 2021-08-12 | End: 2021-10-16

## 2021-08-24 ENCOUNTER — MYC MEDICAL ADVICE (OUTPATIENT)
Dept: FAMILY MEDICINE | Facility: CLINIC | Age: 31
End: 2021-08-24

## 2021-08-24 ENCOUNTER — OFFICE VISIT (OUTPATIENT)
Dept: URGENT CARE | Facility: URGENT CARE | Age: 31
End: 2021-08-24
Payer: COMMERCIAL

## 2021-08-24 VITALS
BODY MASS INDEX: 31.43 KG/M2 | OXYGEN SATURATION: 100 % | HEART RATE: 104 BPM | DIASTOLIC BLOOD PRESSURE: 92 MMHG | TEMPERATURE: 98.4 F | SYSTOLIC BLOOD PRESSURE: 147 MMHG | WEIGHT: 238.2 LBS

## 2021-08-24 DIAGNOSIS — R04.0 EPISTAXIS: Primary | ICD-10-CM

## 2021-08-24 PROCEDURE — 99213 OFFICE O/P EST LOW 20 MIN: CPT | Performed by: INTERNAL MEDICINE

## 2021-08-24 NOTE — PROGRESS NOTES
SUBJECTIVE:  Tejas Villalba III is an 30 year old male who presents for bloody nose.  Was driving today and and around 11:00am started getting a nose bleed. Kept going for about an hour.  Was putting kleenex or paper towels up into nose but kept bleeding.  Continued to drive.  Did stop for a while and tried to stop it.  Didn't apply pressure to nose really but did tilt head back.  Has had nose bleeds before but not last like this before. Has felt some drainage down back of throat and can taste blood in throat. Through the day the bleeding will stop for up to an hour and then start again  No n/v.  No fevers.  Took ibuprofen this morning before nose bleed started.  bleeding has been from right nostril only.    PMH:   has a past medical history of ADHD and HTN.  Patient Active Problem List   Diagnosis     CARDIOVASCULAR SCREENING; LDL GOAL LESS THAN 160     BMI 30.0-30.9,adult     ADD (attention deficit disorder)     Generalized anxiety disorder     Intermittent explosive disorder     Mid back pain     Intractable chronic migraine without aura and without status migrainosus     Major depressive disorder, recurrent episode, mild (H)     Chronic bilateral low back pain with right-sided sciatica     Attention deficit hyperactivity disorder (ADHD), other type     Scalp cyst     Social History     Socioeconomic History     Marital status: Single     Spouse name: Not on file     Number of children: Not on file     Years of education: Not on file     Highest education level: Not on file   Occupational History     Not on file   Tobacco Use     Smoking status: Current Every Day Smoker     Packs/day: 0.50     Years: 10.00     Pack years: 5.00     Last attempt to quit: 2010     Years since quittin.0     Smokeless tobacco: Never Used   Substance and Sexual Activity     Alcohol use: Yes     Comment: OCC     Drug use: No     Sexual activity: Yes     Partners: Female   Other Topics Concern     Parent/sibling w/ CABG,  MI or angioplasty before 65F 55M? Not Asked   Social History Narrative     Not on file     Social Determinants of Health     Financial Resource Strain:      Difficulty of Paying Living Expenses:    Food Insecurity:      Worried About Running Out of Food in the Last Year:      Ran Out of Food in the Last Year:    Transportation Needs:      Lack of Transportation (Medical):      Lack of Transportation (Non-Medical):    Physical Activity:      Days of Exercise per Week:      Minutes of Exercise per Session:    Stress:      Feeling of Stress :    Social Connections:      Frequency of Communication with Friends and Family:      Frequency of Social Gatherings with Friends and Family:      Attends Jain Services:      Active Member of Clubs or Organizations:      Attends Club or Organization Meetings:      Marital Status:    Intimate Partner Violence:      Fear of Current or Ex-Partner:      Emotionally Abused:      Physically Abused:      Sexually Abused:      Family History   Problem Relation Age of Onset     Diabetes Father        ALLERGIES:  Amoxicillin, Ceclor [cefaclor], Codeine, Erythromycin, Septra [bactrim], and Suprax [cefixime]    Current Outpatient Medications   Medication     amphetamine-dextroamphetamine (ADDERALL) 30 MG tablet     butalbital-acetaminophen-caffeine (ESGIC) -40 MG tablet     cyclobenzaprine (FLEXERIL) 10 MG tablet     hydrOXYzine (VISTARIL) 25 MG capsule     methocarbamol (ROBAXIN) 500 MG tablet     nicotine (NICORETTE) 2 MG gum     traZODone (DESYREL) 50 MG tablet     calcium carbonate 1250 (500 Ca) MG CHEW     gabapentin (NEURONTIN) 300 MG capsule     IBUPROFEN PO     sertraline (ZOLOFT) 100 MG tablet     No current facility-administered medications for this visit.         ROS:  ROS is done and is negative for general/constitutional, eye, ENT, Respiratory, cardiovascular, GI, , Skin, musculoskeletal except as noted elsewhere.  All other review of systems negative except as noted  elsewhere.      OBJECTIVE:  BP (!) 147/92   Pulse 104   Temp 98.4  F (36.9  C) (Tympanic)   Wt 108 kg (238 lb 3.2 oz)   SpO2 100%   BMI 31.43 kg/m    GENERAL APPEARANCE: Alert, in no acute distress  EYES: normal  EARS: External ears normal. Canals clear. TM's normal.  NOSE: no active bleeding currently.  initially had a wad of tissue in right nostril which he removed; minimal dried blood on the tissue. Right nostril with small amount of dried blood; no visible vessel ends noted and no active bleeding.  Left nostril normal.    OROPHARYNX:normal  NECK:No adenopathy,masses or thyromegaly  RESP: normal and clear to auscultation  CV:regular rate and rhythm and no murmurs, clicks, or gallops  ABDOMEN: Abdomen soft, non-tender. BS normal. No masses, organomegaly  SKIN: no ulcers, lesions or rash  MUSCULOSKELETAL:Musculoskeletal normal      RESULTS  No results found for any visits on 08/24/21..  No results found for this or any previous visit (from the past 48 hour(s)).    ASSESSMENT/PLAN:    ASSESSMENT / PLAN:  (R04.0) Epistaxis  (primary encounter diagnosis)  Comment: currently no active bleeding.  Pt had not applied any pressure to nose with the bleeding today, so instructed on pressure application for nose bleeds.  Plan: I reviewed supportive care including pressure application to nose with bleeding, avoiding blowing nose, and not putting anything up nose,  expected course, and signs of concern.   Follow up as needed or if he does not improve within 1 day(s) or if worsens in any way. If has recurring bleeds, is to see ENT. Reviewed red flag symptoms including nose bleed that does not stop after 30 min of pressure and is to go to the ER if experiences.      PPE worn: mask and shield.    See [facility] for orders, medications, letters, patient instructions    Marita Viera M.D.

## 2021-08-24 NOTE — PATIENT INSTRUCTIONS
If your nose bleed does not stop after 30 continuous minutes of constant pressure being applied to the nose, go to the emergency room immediately.

## 2021-09-13 ENCOUNTER — MYC REFILL (OUTPATIENT)
Dept: FAMILY MEDICINE | Facility: CLINIC | Age: 31
End: 2021-09-13

## 2021-09-13 DIAGNOSIS — M54.41 CHRONIC BILATERAL LOW BACK PAIN WITH RIGHT-SIDED SCIATICA: ICD-10-CM

## 2021-09-13 DIAGNOSIS — F90.8 ATTENTION DEFICIT HYPERACTIVITY DISORDER (ADHD), OTHER TYPE: ICD-10-CM

## 2021-09-13 DIAGNOSIS — G89.29 CHRONIC BILATERAL LOW BACK PAIN WITH RIGHT-SIDED SCIATICA: ICD-10-CM

## 2021-09-13 DIAGNOSIS — G44.219 EPISODIC TENSION-TYPE HEADACHE, NOT INTRACTABLE: ICD-10-CM

## 2021-09-13 DIAGNOSIS — Z72.0 TOBACCO ABUSE: ICD-10-CM

## 2021-09-13 RX ORDER — BUTALBITAL, ACETAMINOPHEN AND CAFFEINE 50; 325; 40 MG/1; MG/1; MG/1
TABLET ORAL
Qty: 50 TABLET | Refills: 0 | Status: SHIPPED | OUTPATIENT
Start: 2021-09-13 | End: 2021-10-14

## 2021-09-13 RX ORDER — DEXTROAMPHETAMINE SACCHARATE, AMPHETAMINE ASPARTATE, DEXTROAMPHETAMINE SULFATE AND AMPHETAMINE SULFATE 7.5; 7.5; 7.5; 7.5 MG/1; MG/1; MG/1; MG/1
30 TABLET ORAL 2 TIMES DAILY
Qty: 60 TABLET | Refills: 0 | Status: SHIPPED | OUTPATIENT
Start: 2021-09-13 | End: 2021-10-14

## 2021-09-13 RX ORDER — METHOCARBAMOL 500 MG/1
TABLET, FILM COATED ORAL
Qty: 40 TABLET | Refills: 1 | Status: SHIPPED | OUTPATIENT
Start: 2021-09-13 | End: 2021-12-09

## 2021-09-13 NOTE — LETTER
September 13, 2021    Tejas Villalba III  [address] [facility] [address]    Dear Tejas,       We recently received a refill request for amphetamine-dextroamphetamine (ADDERALL) & butalbital-acetaminophen-caffeine (ESGIC) .  We have refilled this for a one time 30 day supply only because you are due for a:    Follow up office visit in he next 30 days for FURTHER REFILLS.      Please call at your earliest convenience so that there will not be a delay with your future refills.          Thank you,   Your [facility] Team/[facility]  [phone]

## 2021-09-26 ENCOUNTER — HEALTH MAINTENANCE LETTER (OUTPATIENT)
Age: 31
End: 2021-09-26

## 2021-10-14 ENCOUNTER — OFFICE VISIT (OUTPATIENT)
Dept: FAMILY MEDICINE | Facility: CLINIC | Age: 31
End: 2021-10-14
Payer: COMMERCIAL

## 2021-10-14 VITALS
BODY MASS INDEX: 31.66 KG/M2 | WEIGHT: 240 LBS | TEMPERATURE: 97.5 F | DIASTOLIC BLOOD PRESSURE: 88 MMHG | SYSTOLIC BLOOD PRESSURE: 133 MMHG | HEART RATE: 100 BPM | OXYGEN SATURATION: 96 %

## 2021-10-14 DIAGNOSIS — F32.2 MAJOR DEPRESSIVE DISORDER, SINGLE EPISODE, SEVERE WITHOUT PSYCHOTIC FEATURES (H): ICD-10-CM

## 2021-10-14 DIAGNOSIS — F90.8 ATTENTION DEFICIT HYPERACTIVITY DISORDER (ADHD), OTHER TYPE: ICD-10-CM

## 2021-10-14 DIAGNOSIS — G44.219 EPISODIC TENSION-TYPE HEADACHE, NOT INTRACTABLE: ICD-10-CM

## 2021-10-14 DIAGNOSIS — G47.00 INSOMNIA, UNSPECIFIED TYPE: ICD-10-CM

## 2021-10-14 PROCEDURE — 99214 OFFICE O/P EST MOD 30 MIN: CPT | Performed by: FAMILY MEDICINE

## 2021-10-14 RX ORDER — DEXTROAMPHETAMINE SACCHARATE, AMPHETAMINE ASPARTATE, DEXTROAMPHETAMINE SULFATE AND AMPHETAMINE SULFATE 7.5; 7.5; 7.5; 7.5 MG/1; MG/1; MG/1; MG/1
30 TABLET ORAL 2 TIMES DAILY
Qty: 60 TABLET | Refills: 0 | Status: SHIPPED | OUTPATIENT
Start: 2021-10-14 | End: 2021-11-10

## 2021-10-14 RX ORDER — BUTALBITAL, ACETAMINOPHEN AND CAFFEINE 50; 325; 40 MG/1; MG/1; MG/1
TABLET ORAL
Qty: 50 TABLET | Refills: 0 | Status: SHIPPED | OUTPATIENT
Start: 2021-10-14 | End: 2021-11-10

## 2021-10-14 RX ORDER — TRAZODONE HYDROCHLORIDE 50 MG/1
TABLET, FILM COATED ORAL
Qty: 30 TABLET | Refills: 1 | Status: SHIPPED | OUTPATIENT
Start: 2021-10-14 | End: 2022-05-04

## 2021-10-14 RX ORDER — SERTRALINE HYDROCHLORIDE 100 MG/1
TABLET, FILM COATED ORAL
Qty: 135 TABLET | Refills: 1 | Status: SHIPPED | OUTPATIENT
Start: 2021-10-14 | End: 2023-08-28

## 2021-10-14 ASSESSMENT — PATIENT HEALTH QUESTIONNAIRE - PHQ9: SUM OF ALL RESPONSES TO PHQ QUESTIONS 1-9: 13

## 2021-10-14 NOTE — PROGRESS NOTES
SUBJECTIVE:  Tejas Villalba III, a 31 year old male scheduled an appointment to discuss the following issues:  Follow-up adhd, anxiety, migraines, tobacco abuse, insomnia and chronic pain issues.  Increased stress from not seeing shortness of breath - x took.   Good support with family.  Work.   No SUICIAL IDEATION OR HOMOCIDAL IDEATION OR BERNADETTE.  Exercise hit/miss. Active at home. No chest pain or shortness of breath. Appetite ok.   Smoking on/off. Nicotine gum. No regular ALCOHOL.    Medical, social, surgical, and family histories reviewed.    ROS:  All other ROS negative.     OBJECTIVE:  BP (!) 152/94   Pulse (!) 126   Temp 97.5  F (36.4  C) (Tympanic)   Wt 108.9 kg (240 lb)   SpO2 96%   BMI 31.66 kg/m    EXAM:  GENERAL APPEARANCE: healthy, alert and no distress  EYES: EOMI,  PERRL  HENT: ear canals and TM's normal and nose and mouth without ulcers or lesions  NECK: no adenopathy, no asymmetry, masses, or scars and thyroid normal to palpation  RESP: lungs clear to auscultation - no rales, rhonchi or wheezes  CV: regular rates and rhythm, normal S1 S2, no S3 or S4 and no murmur, click or rub -  ABDOMEN:  soft, nontender, no HSM or masses and bowel sounds normal  MS: extremities normal- no gross deformities noted, no evidence of inflammation in joints, FROM in all extremities.  PSYCH: mentation appears normal and affect normal/bright  PSYCH: anxious    ASSESSMENT / PLAN:  (F90.8) Attention deficit hyperactivity disorder (ADHD), other type  Comment: stable  Plan: amphetamine-dextroamphetamine (ADDERALL) 30 MG         tablet        Prn work. Reveiwed risks and side effects of medication      (G44.219) Episodic tension-type headache, not intractable  Comment: stable  Plan: butalbital-acetaminophen-caffeine (ESGIC)         -40 MG tablet        Prn. Exercise and continue smoking cessation    (F32.2) Major depressive disorder, single episode, severe without psychotic features (H)  Comment: worse recently  with stress from son  Plan: sertraline (ZOLOFT) 100 MG tablet        Advised continue exercise and avoid ALCOHOL. If SUICIAL IDEATION OR HOMOCIDAL IDEATION OR BERNADETTE TO ER. Patient just graduated and looking for new job and excited about that and advised to talk with a  about custody issues. Good support system with family. Call/email with questions/concerns.     (G47.00) Insomnia, unspecified type  Comment: stabld  Plan: traZODone (DESYREL) 50 MG tablet        Prn. Avoid with ALCOHOL     Karan Waters MD

## 2021-10-16 DIAGNOSIS — M54.41 CHRONIC BILATERAL LOW BACK PAIN WITH RIGHT-SIDED SCIATICA: ICD-10-CM

## 2021-10-16 DIAGNOSIS — G89.29 CHRONIC BILATERAL LOW BACK PAIN WITH RIGHT-SIDED SCIATICA: ICD-10-CM

## 2021-10-16 RX ORDER — CYCLOBENZAPRINE HCL 10 MG
TABLET ORAL
Qty: 30 TABLET | Refills: 1 | Status: SHIPPED | OUTPATIENT
Start: 2021-10-16 | End: 2022-02-10

## 2021-11-10 ENCOUNTER — MYC REFILL (OUTPATIENT)
Dept: FAMILY MEDICINE | Facility: CLINIC | Age: 31
End: 2021-11-10
Payer: COMMERCIAL

## 2021-11-10 DIAGNOSIS — F90.8 ATTENTION DEFICIT HYPERACTIVITY DISORDER (ADHD), OTHER TYPE: ICD-10-CM

## 2021-11-10 DIAGNOSIS — G44.219 EPISODIC TENSION-TYPE HEADACHE, NOT INTRACTABLE: ICD-10-CM

## 2021-11-11 DIAGNOSIS — Z72.0 TOBACCO ABUSE: ICD-10-CM

## 2021-11-12 RX ORDER — BUTALBITAL, ACETAMINOPHEN AND CAFFEINE 50; 325; 40 MG/1; MG/1; MG/1
TABLET ORAL
Qty: 50 TABLET | Refills: 0 | Status: SHIPPED | OUTPATIENT
Start: 2021-11-12 | End: 2021-12-09

## 2021-11-12 RX ORDER — DEXTROAMPHETAMINE SACCHARATE, AMPHETAMINE ASPARTATE, DEXTROAMPHETAMINE SULFATE AND AMPHETAMINE SULFATE 7.5; 7.5; 7.5; 7.5 MG/1; MG/1; MG/1; MG/1
30 TABLET ORAL 2 TIMES DAILY
Qty: 60 TABLET | Refills: 0 | Status: SHIPPED | OUTPATIENT
Start: 2021-11-12 | End: 2021-12-09

## 2021-12-09 ENCOUNTER — MYC REFILL (OUTPATIENT)
Dept: FAMILY MEDICINE | Facility: CLINIC | Age: 31
End: 2021-12-09
Payer: COMMERCIAL

## 2021-12-09 DIAGNOSIS — G89.29 CHRONIC BILATERAL LOW BACK PAIN WITH RIGHT-SIDED SCIATICA: ICD-10-CM

## 2021-12-09 DIAGNOSIS — M54.41 CHRONIC BILATERAL LOW BACK PAIN WITH RIGHT-SIDED SCIATICA: ICD-10-CM

## 2021-12-09 DIAGNOSIS — F90.8 ATTENTION DEFICIT HYPERACTIVITY DISORDER (ADHD), OTHER TYPE: ICD-10-CM

## 2021-12-09 DIAGNOSIS — G44.219 EPISODIC TENSION-TYPE HEADACHE, NOT INTRACTABLE: ICD-10-CM

## 2021-12-09 RX ORDER — METHOCARBAMOL 500 MG/1
TABLET, FILM COATED ORAL
Qty: 40 TABLET | Refills: 1 | Status: SHIPPED | OUTPATIENT
Start: 2021-12-09

## 2021-12-09 NOTE — TELEPHONE ENCOUNTER
Routing refill request to provider for review/approval because:  Requested Prescriptions   Pending Prescriptions Disp Refills    methocarbamol (ROBAXIN) 500 MG tablet [Pharmacy Med Name: METHOCARBAMOL 500MG TABLETS] 40 tablet 1     Sig: TAKE 1 TO 2 TABLETS BY MOUTH DAILY DURING THE DAY AS NEEDED FOR MUSCLE SPASMS        There is no refill protocol information for this order             Maddi MAYON, RN

## 2021-12-10 RX ORDER — BUTALBITAL, ACETAMINOPHEN AND CAFFEINE 50; 325; 40 MG/1; MG/1; MG/1
TABLET ORAL
Qty: 50 TABLET | Refills: 0 | Status: SHIPPED | OUTPATIENT
Start: 2021-12-10 | End: 2022-01-10

## 2021-12-10 RX ORDER — DEXTROAMPHETAMINE SACCHARATE, AMPHETAMINE ASPARTATE, DEXTROAMPHETAMINE SULFATE AND AMPHETAMINE SULFATE 7.5; 7.5; 7.5; 7.5 MG/1; MG/1; MG/1; MG/1
30 TABLET ORAL 2 TIMES DAILY
Qty: 60 TABLET | Refills: 0 | Status: SHIPPED | OUTPATIENT
Start: 2021-12-10 | End: 2022-01-07

## 2022-01-07 ENCOUNTER — MYC REFILL (OUTPATIENT)
Dept: FAMILY MEDICINE | Facility: CLINIC | Age: 32
End: 2022-01-07
Payer: COMMERCIAL

## 2022-01-07 DIAGNOSIS — F90.8 ATTENTION DEFICIT HYPERACTIVITY DISORDER (ADHD), OTHER TYPE: ICD-10-CM

## 2022-01-07 DIAGNOSIS — G44.219 EPISODIC TENSION-TYPE HEADACHE, NOT INTRACTABLE: ICD-10-CM

## 2022-01-10 DIAGNOSIS — G44.219 EPISODIC TENSION-TYPE HEADACHE, NOT INTRACTABLE: ICD-10-CM

## 2022-01-10 RX ORDER — DEXTROAMPHETAMINE SACCHARATE, AMPHETAMINE ASPARTATE, DEXTROAMPHETAMINE SULFATE AND AMPHETAMINE SULFATE 7.5; 7.5; 7.5; 7.5 MG/1; MG/1; MG/1; MG/1
30 TABLET ORAL 2 TIMES DAILY
Qty: 60 TABLET | Refills: 0 | Status: SHIPPED | OUTPATIENT
Start: 2022-01-10 | End: 2022-02-08

## 2022-01-10 RX ORDER — BUTALBITAL, ACETAMINOPHEN AND CAFFEINE 50; 325; 40 MG/1; MG/1; MG/1
TABLET ORAL
Qty: 50 TABLET | Refills: 0 | OUTPATIENT
Start: 2022-01-10

## 2022-01-10 RX ORDER — BUTALBITAL, ACETAMINOPHEN AND CAFFEINE 50; 325; 40 MG/1; MG/1; MG/1
TABLET ORAL
Qty: 50 TABLET | Refills: 1 | Status: SHIPPED | OUTPATIENT
Start: 2022-01-10 | End: 2022-03-07

## 2022-01-26 ENCOUNTER — NURSE TRIAGE (OUTPATIENT)
Dept: NURSING | Facility: CLINIC | Age: 32
End: 2022-01-26
Payer: COMMERCIAL

## 2022-01-26 NOTE — TELEPHONE ENCOUNTER
Triage call:     Patient was seen in the ER last week- was given antibiotics last week and pain medication  - possible dry socket  Pulled tooth last Thursday back molar- started on antibiotics per patient  - states that the dentist is closed due to a COVID exposure   Pain into his jaw and up into his face- pressure pain on the right side  Rating pain 5/10   Reports he has been doing gauze packing  States that there is swelling in his face and mouth    Advised to be seen in the clinic now- reviewed additional care advice with patient and he verbalizes understanding. Assisted in transferring to scheduling.     Reyna Barajas RN BSN 1/26/2022 11:04 AM    COVID 19 Nurse Triage Plan/Patient Instructions    Please be aware that novel coronavirus (COVID-19) may be circulating in the community. If you develop symptoms such as fever, cough, or SOB or if you have concerns about the presence of another infection including coronavirus (COVID-19), please contact your health care provider or visit [email or web address].     Disposition/Instructions    In-Person Visit with provider recommended. Reference Visit Selection Guide.    Thank you for taking steps to prevent the spread of this virus.  o Limit your contact with others.  o Wear a simple mask to cover your cough.  o Wash your hands well and often.    Resources    [facility]: About COVID-19: [email or web address]    CDC: What to Do If You're Sick: www.cdc.gov/coronavirus/2019-ncov/about/steps-when-sick.html    CDC: Ending Home Isolation: www.cdc.gov/coronavirus/2019-ncov/hcp/disposition-in-home-patients.html     CDC: Caring for Someone: www.cdc.gov/coronavirus/2019-ncov/if-you-are-sick/care-for-someone.html     [facility]: Interim Guidance for Hospital Discharge to Home: www.[email or web address].[email or web address]/diseases/coronavirus/hcp/hospdischarge.pdf    [facility] clinical trials (COVID-19 research studies): [email or web address]      Below are the COVID-19 hotlines at the [place] Department of Health ([facility]). Interpreters are available.   o For health questions: Call 472-200-5198 or 1-197.249.5796 (7 a.m. to 7 p.m.)  o For questions about schools and childcare: Call 301-478-5449 or 1-329.621.9138 (7 a.m. to 7 p.m.)   Reason for Disposition    Tooth is painful or swelling around a tooth    Face is swollen    Additional Information    Negative: Severe difficulty breathing (e.g., struggling for each breath, speaks in single words, stridor)    Negative: Sounds like a life-threatening emergency to the triager    Negative: Injury to tooth or teeth    Negative: Injury to mouth    Negative: Cold sore suspected (i.e., fever blister sore) on the outer lip    Negative: Pale cold skin and very weak (can't stand)    Negative: Similar pain previously and it was from 'heart attack'    Negative: Similar pain previously and it was from 'angina' and not relieved by nitroglycerin    Negative: Sounds like a life-threatening emergency to the triager    Negative: Chest pain    Negative: Toothache followed tooth injury    Negative: Patient sounds very sick or weak to the triager    Protocols used: MOUTH SYMPTOMS-A-OH, TOOTHACHE-A-OH

## 2022-01-27 ENCOUNTER — MYC MEDICAL ADVICE (OUTPATIENT)
Dept: FAMILY MEDICINE | Facility: CLINIC | Age: 32
End: 2022-01-27
Payer: COMMERCIAL

## 2022-02-08 ENCOUNTER — MYC REFILL (OUTPATIENT)
Dept: FAMILY MEDICINE | Facility: CLINIC | Age: 32
End: 2022-02-08
Payer: COMMERCIAL

## 2022-02-08 DIAGNOSIS — F90.8 ATTENTION DEFICIT HYPERACTIVITY DISORDER (ADHD), OTHER TYPE: ICD-10-CM

## 2022-02-08 RX ORDER — DEXTROAMPHETAMINE SACCHARATE, AMPHETAMINE ASPARTATE, DEXTROAMPHETAMINE SULFATE AND AMPHETAMINE SULFATE 7.5; 7.5; 7.5; 7.5 MG/1; MG/1; MG/1; MG/1
30 TABLET ORAL 2 TIMES DAILY
Qty: 60 TABLET | Refills: 0 | Status: SHIPPED | OUTPATIENT
Start: 2022-02-09 | End: 2022-03-07

## 2022-02-10 DIAGNOSIS — G89.29 CHRONIC BILATERAL LOW BACK PAIN WITH RIGHT-SIDED SCIATICA: ICD-10-CM

## 2022-02-10 DIAGNOSIS — M54.41 CHRONIC BILATERAL LOW BACK PAIN WITH RIGHT-SIDED SCIATICA: ICD-10-CM

## 2022-02-10 RX ORDER — CYCLOBENZAPRINE HCL 10 MG
TABLET ORAL
Qty: 30 TABLET | Refills: 1 | Status: SHIPPED | OUTPATIENT
Start: 2022-02-10 | End: 2024-01-24

## 2022-03-07 DIAGNOSIS — F90.8 ATTENTION DEFICIT HYPERACTIVITY DISORDER (ADHD), OTHER TYPE: ICD-10-CM

## 2022-03-07 DIAGNOSIS — G44.219 EPISODIC TENSION-TYPE HEADACHE, NOT INTRACTABLE: ICD-10-CM

## 2022-03-07 RX ORDER — DEXTROAMPHETAMINE SACCHARATE, AMPHETAMINE ASPARTATE, DEXTROAMPHETAMINE SULFATE AND AMPHETAMINE SULFATE 7.5; 7.5; 7.5; 7.5 MG/1; MG/1; MG/1; MG/1
30 TABLET ORAL 2 TIMES DAILY
Qty: 60 TABLET | Refills: 0 | Status: SHIPPED | OUTPATIENT
Start: 2022-03-07 | End: 2022-04-04

## 2022-03-07 RX ORDER — BUTALBITAL, ACETAMINOPHEN AND CAFFEINE 50; 325; 40 MG/1; MG/1; MG/1
TABLET ORAL
Qty: 50 TABLET | Refills: 0 | Status: SHIPPED | OUTPATIENT
Start: 2022-03-07 | End: 2022-04-04

## 2022-04-04 ENCOUNTER — MYC REFILL (OUTPATIENT)
Dept: FAMILY MEDICINE | Facility: CLINIC | Age: 32
End: 2022-04-04
Payer: COMMERCIAL

## 2022-04-04 DIAGNOSIS — F90.8 ATTENTION DEFICIT HYPERACTIVITY DISORDER (ADHD), OTHER TYPE: ICD-10-CM

## 2022-04-04 DIAGNOSIS — G44.219 EPISODIC TENSION-TYPE HEADACHE, NOT INTRACTABLE: ICD-10-CM

## 2022-04-04 RX ORDER — BUTALBITAL, ACETAMINOPHEN AND CAFFEINE 50; 325; 40 MG/1; MG/1; MG/1
TABLET ORAL
Qty: 50 TABLET | Refills: 0 | Status: SHIPPED | OUTPATIENT
Start: 2022-04-06 | End: 2022-05-04

## 2022-04-04 RX ORDER — DEXTROAMPHETAMINE SACCHARATE, AMPHETAMINE ASPARTATE, DEXTROAMPHETAMINE SULFATE AND AMPHETAMINE SULFATE 7.5; 7.5; 7.5; 7.5 MG/1; MG/1; MG/1; MG/1
30 TABLET ORAL 2 TIMES DAILY
Qty: 60 TABLET | Refills: 0 | Status: SHIPPED | OUTPATIENT
Start: 2022-04-06 | End: 2022-05-04

## 2022-04-04 NOTE — TELEPHONE ENCOUNTER
Routing refill request to provider for review/approval because:  Drug/ supplies not on the [facility] refill protocol     Tangela Del Rio BSN, RN

## 2022-05-03 ENCOUNTER — OFFICE VISIT (OUTPATIENT)
Dept: URGENT CARE | Facility: URGENT CARE | Age: 32
End: 2022-05-03
Payer: COMMERCIAL

## 2022-05-03 VITALS
HEART RATE: 105 BPM | SYSTOLIC BLOOD PRESSURE: 136 MMHG | DIASTOLIC BLOOD PRESSURE: 80 MMHG | OXYGEN SATURATION: 97 % | RESPIRATION RATE: 16 BRPM | TEMPERATURE: 97.9 F

## 2022-05-03 DIAGNOSIS — F41.9 ANXIETY: ICD-10-CM

## 2022-05-03 DIAGNOSIS — R03.0 ELEVATED BLOOD PRESSURE READING: ICD-10-CM

## 2022-05-03 DIAGNOSIS — R04.0 EPISTAXIS: Primary | ICD-10-CM

## 2022-05-03 DIAGNOSIS — R00.2 PALPITATIONS: ICD-10-CM

## 2022-05-03 PROCEDURE — 93000 ELECTROCARDIOGRAM COMPLETE: CPT | Performed by: PHYSICIAN ASSISTANT

## 2022-05-03 PROCEDURE — 99214 OFFICE O/P EST MOD 30 MIN: CPT | Performed by: PHYSICIAN ASSISTANT

## 2022-05-03 RX ORDER — CLONAZEPAM 0.5 MG/1
0.5 TABLET, ORALLY DISINTEGRATING ORAL 2 TIMES DAILY PRN
Qty: 10 TABLET | Refills: 0 | Status: SHIPPED | OUTPATIENT
Start: 2022-05-03 | End: 2022-05-11

## 2022-05-03 ASSESSMENT — ENCOUNTER SYMPTOMS
WHEEZING: 0
PALPITATIONS: 1
COUGH: 0
NUMBNESS: 1
SHORTNESS OF BREATH: 1
CHEST TIGHTNESS: 1
DIZZINESS: 1
FEVER: 0

## 2022-05-03 NOTE — PROGRESS NOTES
SUBJECTIVE:   Tejas Villalba III is a 31 year old male presenting with a chief complaint of   Chief Complaint   Patient presents with     Epistaxis     Has a bloody nose since this morning. Has been waking up with them the last few mornings     Hypertension     High BP, chest tightness, fluttering in chest      Dizziness     Numbness     Stress     Under a lot of stress, possible anxiety     Shortness of Breath       He is an established patient of [facility].  Patient presenting with epistaxis that is recurrent.  Last here on 8/24/21 for the same.  Patient states he is also having numbness BID for a while, chest tightness, SOB, chest pain, dizziness and palpitations today.  States was at dentist earlier and was told high blood pressure.  Patient doesn't know if he has ever seen ENT.  States he is under a lot of stress and he sees a therapist for his anxiety.  Patient states bleeding from right nostril most of the time.      Treatment:  nothing    Review of Systems   Constitutional: Negative for fever.   HENT: Positive for nosebleeds.    Respiratory: Positive for chest tightness and shortness of breath. Negative for cough and wheezing.    Cardiovascular: Positive for chest pain and palpitations. Negative for leg swelling.   Neurological: Positive for dizziness and numbness.   All other systems reviewed and are negative.      Past Medical History:   Diagnosis Date     ADHD      HTN      Family History   Problem Relation Age of Onset     Diabetes Father      Current Outpatient Medications   Medication Sig Dispense Refill     amphetamine-dextroamphetamine (ADDERALL) 30 MG tablet Take 1 tablet (30 mg) by mouth 2 times daily Stop the adderall XR 60 tablet 0     butalbital-acetaminophen-caffeine (ESGIC) -40 MG tablet TAKE 1 TABLET BY MOUTH EVERY 12 HOURS AS NEEDED. AVOID WITH KLONOPIN/ CLONAZEPAM. MAX 50 PER MONTH 50 tablet 0     clonazePAM (KLONOPIN) 0.5 MG ODT Take 1 tablet (0.5 mg) by mouth 2 times daily as  needed for anxiety 10 tablet 0     IBUPROFEN PO Take 600 mg by mouth 2 times daily As needed       traZODone (DESYREL) 50 MG tablet TAKE 1 TABLET(50 MG) BY MOUTH EVERY NIGHT AS NEEDED FOR SLEEP. DO NOT TAKE WITH FLEXERIL 30 tablet 1     calcium carbonate 1250 (500 Ca) MG CHEW Take 500 mg by mouth        cyclobenzaprine (FLEXERIL) 10 MG tablet TAKE 1/2 TO 1 TABLET(5 TO 10 MG) BY MOUTH EVERY NIGHT AS NEEDED FOR MUSCLE SPASMS 30 tablet 1     gabapentin (NEURONTIN) 300 MG capsule TAKE 1 CAPSULE(300 MG) BY MOUTH EVERY NIGHT AS NEEDED FOR SLEEP OR BACK PAIN 90 capsule 0     hydrOXYzine (VISTARIL) 25 MG capsule TAKE 1 TO 2 CAPSULES BY MOUTH AT BEDTIME AS NEEDED FOR SLEEP OR ANXIETY 30 capsule 1     methocarbamol (ROBAXIN) 500 MG tablet TAKE 1 TO 2 TABLETS BY MOUTH DAILY DURING THE DAY AS NEEDED FOR MUSCLE SPASMS 40 tablet 1     nicotine (NICORETTE) 2 MG gum PLACE 1 PIECE OF GUM IN CHEEK AS NEEDED FOR SMOKING CESSATION 100 each 3     sertraline (ZOLOFT) 100 MG tablet TAKE 1.5 TABLETS BY MOUTH FOR DEPRESSION AND ANXIETY. (Patient not taking: Reported on 5/3/2022) 135 tablet 1     Social History     Tobacco Use     Smoking status: Current Every Day Smoker     Packs/day: 0.50     Years: 10.00     Pack years: 5.00     Last attempt to quit: 2010     Years since quittin.7     Smokeless tobacco: Never Used   Substance Use Topics     Alcohol use: Yes     Comment: OCC       OBJECTIVE  BP (!) 149/91   Pulse 105   Temp 97.9  F (36.6  C) (Oral)   Resp 16   SpO2 97%     Physical Exam  Vitals and nursing note reviewed.   Constitutional:       Appearance: Normal appearance. He is normal weight.   HENT:      Nose: Nose normal.      Comments: Not actively bleeding.  No blood seen.    Eyes:      Extraocular Movements: Extraocular movements intact.      Conjunctiva/sclera: Conjunctivae normal.   Cardiovascular:      Rate and Rhythm: Normal rate and regular rhythm.      Pulses: Normal pulses.      Heart sounds: Normal heart  sounds.   Pulmonary:      Effort: Pulmonary effort is normal.      Breath sounds: Normal breath sounds.   Musculoskeletal:      Comments: Negative tinels  Negative kathy.   Skin:     General: Skin is warm and dry.   Neurological:      Mental Status: He is alert.   Psychiatric:         Mood and Affect: Mood normal.         Labs:  No results found for this or any previous visit (from the past 24 hour(s)).    X-Ray was not done.  Ekg:  Nsr, hr 81    ASSESSMENT:      ICD-10-CM    1. Epistaxis  R04.0 Otolaryngology Referral   2. Palpitations  R00.2 EKG 12-lead complete w/read - Clinics   3. Anxiety  F41.9 clonazePAM (KLONOPIN) 0.5 MG ODT   4. Elevated blood pressure reading  R03.0         Medical Decision Making:    Differential Diagnosis:  Epistaxis, anxiety, arrhythmia    Serious Comorbid Conditions:  Adult:  reviewed    PLAN:    ENT referral.    Recommended he see Dr. Waters in the near future to discuss some treatment options for anxiety attacks.  States he has has clonazepam in the past.  Rx for the same #10.    Blood pressure to be repeated before discharge.  Patient asked to monitor blood pressure and follow up with PCP for management.        Followup:    If not improving or if condition worsens, follow up with your Primary Care Provider, If not improving or if conditions worsens over the next 12-24 hours, go to the Emergency Department    There are no Patient Instructions on file for this visit.

## 2022-05-04 ENCOUNTER — MYC REFILL (OUTPATIENT)
Dept: FAMILY MEDICINE | Facility: CLINIC | Age: 32
End: 2022-05-04
Payer: COMMERCIAL

## 2022-05-04 DIAGNOSIS — G44.219 EPISODIC TENSION-TYPE HEADACHE, NOT INTRACTABLE: ICD-10-CM

## 2022-05-04 DIAGNOSIS — G47.00 INSOMNIA, UNSPECIFIED TYPE: ICD-10-CM

## 2022-05-04 DIAGNOSIS — F90.8 ATTENTION DEFICIT HYPERACTIVITY DISORDER (ADHD), OTHER TYPE: ICD-10-CM

## 2022-05-04 RX ORDER — TRAZODONE HYDROCHLORIDE 50 MG/1
TABLET, FILM COATED ORAL
Qty: 30 TABLET | Refills: 3 | Status: SHIPPED | OUTPATIENT
Start: 2022-05-04

## 2022-05-04 RX ORDER — DEXTROAMPHETAMINE SACCHARATE, AMPHETAMINE ASPARTATE, DEXTROAMPHETAMINE SULFATE AND AMPHETAMINE SULFATE 7.5; 7.5; 7.5; 7.5 MG/1; MG/1; MG/1; MG/1
30 TABLET ORAL 2 TIMES DAILY
Qty: 60 TABLET | Refills: 0 | Status: SHIPPED | OUTPATIENT
Start: 2022-05-04 | End: 2022-06-02

## 2022-05-04 RX ORDER — BUTALBITAL, ACETAMINOPHEN AND CAFFEINE 50; 325; 40 MG/1; MG/1; MG/1
TABLET ORAL
Qty: 50 TABLET | Refills: 0 | Status: SHIPPED | OUTPATIENT
Start: 2022-05-04 | End: 2022-06-02

## 2022-05-04 NOTE — TELEPHONE ENCOUNTER
Pt requesting fill date of 5/5/22    Routing refill request to provider for review/approval because:  Drug not on the [facility] refill protocol   Shama Roberts BSN, RN

## 2022-05-04 NOTE — LETTER
May 4, 2022    Tejas Villalba III  [address] [facility] [address]    Dear Tejas,       We recently received a refill request for amphetamine-dextroamphetamine (ADDERALL) 30 MG tablet and butalbital-acetaminophen-caffeine (ESGIC) -40 MG tablet.  We have refilled this for a one time 30 day supply only because you are due for a:    Medication Check Video visit      Please call at your earliest convenience so that there will not be a delay with your future refills.          Thank you,   Your [facility] Team/[place]  [phone]

## 2022-05-04 NOTE — TELEPHONE ENCOUNTER
Routing refill request to provider for review/approval because:  Drug interaction warning    Tangela Del Rio BSN, RN

## 2022-05-08 ENCOUNTER — HEALTH MAINTENANCE LETTER (OUTPATIENT)
Age: 32
End: 2022-05-08

## 2022-05-11 DIAGNOSIS — F41.9 ANXIETY: ICD-10-CM

## 2022-05-11 RX ORDER — CLONAZEPAM 0.5 MG/1
0.5 TABLET, ORALLY DISINTEGRATING ORAL 2 TIMES DAILY PRN
Qty: 10 TABLET | Refills: 0 | Status: SHIPPED | OUTPATIENT
Start: 2022-05-11 | End: 2022-05-17

## 2022-05-11 NOTE — TELEPHONE ENCOUNTER
Routing refill request to provider for review/approval because:  Drug not on the [facility] refill protocol   Shama Robetrs BSN, RN

## 2022-05-16 DIAGNOSIS — F41.9 ANXIETY: ICD-10-CM

## 2022-05-16 NOTE — PROGRESS NOTES
ASSESSMENT / PLAN:  (R04.0) Epistaxis  (primary encounter diagnosis)  Comment: likely from dry nares/stress  Plan: CBC with platelets, Adult ENT          Referral        Follow-up ENT. Nasal saline. Limit nsaids and avoid ALCOHOL and asa. Expected course and warning signs reviewed. Await cbc    (R00.2) Palpitations  Comment: likley from stress  Plan: CBC with platelets, TSH with free T4 reflex,         propranolol (INDERAL) 20 MG tablet        Sister with thyroid issues. Chest pain or shortness of breath or prolonged palpitations to er. Limit caffeine.     (F41.9) Anxiety  Comment: needs help  Plan: propranolol (INDERAL) 20 MG tablet        Add propranolol. LA if helpful  Recheck in 3 months  Sooner if worse. If SUICIAL IDEATION OR HOMOCIDAL IDEATION OR BERNADETTE TO ER. Restart zoloft if needed. Continue theparist. Exercise.   (M79.641) Pain of right hand  Comment: chronic issues  Plan: Orthopedic  Referral        Follow-up orthoSaroj Wooten   Ousmane is a 31 year old who presents for the following health issues.  Follow-up adhd, anxiety, migraines, tobacco abuse, insomnia and chronic pain issues.  No daily nsaids/asa or ALCOHOL.  Had nose broken in past.  No bleeding issues. Some rhinorrhea. Believes from stress related. Custody issues with 8yo son. No seen son since fall.   Seeing therapist. No SUICIAL IDEATION OR HOMOCIDAL IDEATION OR BERNADETTE.   Good support system. Active at work. Off zoloft. Patient would like to see an orthopedist for chronic right hand pain.   Was in urgent care for nose bleed and elevated blood pressure. Normal ekg and referral given for ENT. Given some klonopin.   History of Present Illness       Mental Health Follow-up:  Patient presents to follow-up on Anxiety.    Patient's anxiety since last visit has been:  Medium  The patient is having other symptoms associated with anxiety.  Any significant life events: relationship concerns, job concerns, financial concerns, grief  or loss and health concerns  Patient is feeling anxious or having panic attacks.  Patient has no concerns about alcohol or drug use.    Reason for visit:  Anxiety panic attack and hand / wrist pain possibly check thyroid  Symptom onset:  3-4 weeks ago  Symptom intensity:  Severe  Symptom progression:  Improving  Had these symptoms before:  No  What makes it worse:  Work stress  What makes it better:  Less stress breaks and unfortunately medication has been the best relief currently    He eats 2-3 servings of fruits and vegetables daily.He consumes 2 sweetened beverage(s) daily.He exercises with enough effort to increase his heart rate 60 or more minutes per day.  He exercises with enough effort to increase his heart rate 4 days per week.   He is taking medications regularly.    Today's PHQ-9         PHQ-9 Total Score: 6    PHQ-9 Q9 Thoughts of better off dead/self-harm past 2 weeks :   Not at all    How difficult have these problems made it for you to do your work, take care of things at home, or get along with other people: Very difficult  Today's NAOMI-7 Score: 6         Review of Systems   All other ROS negative      Objective    There were no vitals taken for this visit.  There is no height or weight on file to calculate BMI.  Physical Exam   GENERAL: healthy, alert and no distress  EYES: Eyes grossly normal to inspection, PERRL and conjunctivae and sclerae normal  HENT: ear canals and TM's normal, nose and mouth without ulcers or lesions  NECK: no adenopathy, no asymmetry, masses, or scars and thyroid normal to palpation  RESP: lungs clear to auscultation - no rales, rhonchi or wheezes  CV: regular rate and rhythm, normal S1 S2, no S3 or S4, no murmur, click or rub, no peripheral edema and peripheral pulses strong  ABDOMEN: soft, nontender, no hepatosplenomegaly, no masses and bowel sounds normal  MS: no gross musculoskeletal defects noted, no edema  NEURO: Normal strength and tone, mentation intact and speech  normal  PSYCH: mentation appears normal, affect normal/bright  PSYCH: anxious

## 2022-05-17 RX ORDER — CLONAZEPAM 0.5 MG/1
TABLET, ORALLY DISINTEGRATING ORAL
Qty: 10 TABLET | Refills: 0 | Status: SHIPPED | OUTPATIENT
Start: 2022-05-17 | End: 2022-05-19

## 2022-05-19 ENCOUNTER — MYC MEDICAL ADVICE (OUTPATIENT)
Dept: FAMILY MEDICINE | Facility: CLINIC | Age: 32
End: 2022-05-19

## 2022-05-19 ENCOUNTER — OFFICE VISIT (OUTPATIENT)
Dept: FAMILY MEDICINE | Facility: CLINIC | Age: 32
End: 2022-05-19
Payer: COMMERCIAL

## 2022-05-19 VITALS
TEMPERATURE: 98.8 F | SYSTOLIC BLOOD PRESSURE: 142 MMHG | WEIGHT: 240.2 LBS | HEART RATE: 103 BPM | DIASTOLIC BLOOD PRESSURE: 80 MMHG | BODY MASS INDEX: 30.83 KG/M2 | HEIGHT: 74 IN | OXYGEN SATURATION: 99 %

## 2022-05-19 DIAGNOSIS — F41.9 ANXIETY: ICD-10-CM

## 2022-05-19 DIAGNOSIS — R04.0 EPISTAXIS: Primary | ICD-10-CM

## 2022-05-19 DIAGNOSIS — R00.2 PALPITATIONS: ICD-10-CM

## 2022-05-19 DIAGNOSIS — M79.641 PAIN OF RIGHT HAND: ICD-10-CM

## 2022-05-19 LAB
ERYTHROCYTE [DISTWIDTH] IN BLOOD BY AUTOMATED COUNT: 13.3 % (ref 10–15)
HCT VFR BLD AUTO: 46.3 % (ref 40–53)
HGB BLD-MCNC: 15.6 G/DL (ref 13.3–17.7)
MCH RBC QN AUTO: 30.7 PG (ref 26.5–33)
MCHC RBC AUTO-ENTMCNC: 33.7 G/DL (ref 31.5–36.5)
MCV RBC AUTO: 91 FL (ref 78–100)
PLATELET # BLD AUTO: 195 10E3/UL (ref 150–450)
RBC # BLD AUTO: 5.08 10E6/UL (ref 4.4–5.9)
T4 FREE SERPL-MCNC: 0.79 NG/DL (ref 0.76–1.46)
TSH SERPL DL<=0.005 MIU/L-ACNC: 0.32 MU/L (ref 0.4–4)
WBC # BLD AUTO: 7 10E3/UL (ref 4–11)

## 2022-05-19 PROCEDURE — 99214 OFFICE O/P EST MOD 30 MIN: CPT | Performed by: FAMILY MEDICINE

## 2022-05-19 PROCEDURE — 84439 ASSAY OF FREE THYROXINE: CPT | Performed by: FAMILY MEDICINE

## 2022-05-19 PROCEDURE — 85027 COMPLETE CBC AUTOMATED: CPT | Performed by: FAMILY MEDICINE

## 2022-05-19 PROCEDURE — 84443 ASSAY THYROID STIM HORMONE: CPT | Performed by: FAMILY MEDICINE

## 2022-05-19 PROCEDURE — 36415 COLL VENOUS BLD VENIPUNCTURE: CPT | Performed by: FAMILY MEDICINE

## 2022-05-19 RX ORDER — PROPRANOLOL HYDROCHLORIDE 20 MG/1
20 TABLET ORAL 3 TIMES DAILY PRN
Qty: 90 TABLET | Refills: 1 | Status: SHIPPED | OUTPATIENT
Start: 2022-05-19 | End: 2022-07-21

## 2022-05-19 RX ORDER — CLONAZEPAM 0.5 MG/1
TABLET, ORALLY DISINTEGRATING ORAL
Qty: 30 TABLET | Refills: 0 | Status: SHIPPED | OUTPATIENT
Start: 2022-05-19 | End: 2023-02-17

## 2022-05-19 ASSESSMENT — ANXIETY QUESTIONNAIRES
3. WORRYING TOO MUCH ABOUT DIFFERENT THINGS: SEVERAL DAYS
1. FEELING NERVOUS, ANXIOUS, OR ON EDGE: SEVERAL DAYS
4. TROUBLE RELAXING: SEVERAL DAYS
7. FEELING AFRAID AS IF SOMETHING AWFUL MIGHT HAPPEN: SEVERAL DAYS
8. IF YOU CHECKED OFF ANY PROBLEMS, HOW DIFFICULT HAVE THESE MADE IT FOR YOU TO DO YOUR WORK, TAKE CARE OF THINGS AT HOME, OR GET ALONG WITH OTHER PEOPLE?: SOMEWHAT DIFFICULT
7. FEELING AFRAID AS IF SOMETHING AWFUL MIGHT HAPPEN: SEVERAL DAYS
GAD7 TOTAL SCORE: 6
6. BECOMING EASILY ANNOYED OR IRRITABLE: SEVERAL DAYS
GAD7 TOTAL SCORE: 6
GAD7 TOTAL SCORE: 6
5. BEING SO RESTLESS THAT IT IS HARD TO SIT STILL: NOT AT ALL
2. NOT BEING ABLE TO STOP OR CONTROL WORRYING: SEVERAL DAYS

## 2022-05-19 ASSESSMENT — PAIN SCALES - GENERAL: PAINLEVEL: NO PAIN (1)

## 2022-05-19 ASSESSMENT — PATIENT HEALTH QUESTIONNAIRE - PHQ9
SUM OF ALL RESPONSES TO PHQ QUESTIONS 1-9: 6
SUM OF ALL RESPONSES TO PHQ QUESTIONS 1-9: 6
10. IF YOU CHECKED OFF ANY PROBLEMS, HOW DIFFICULT HAVE THESE PROBLEMS MADE IT FOR YOU TO DO YOUR WORK, TAKE CARE OF THINGS AT HOME, OR GET ALONG WITH OTHER PEOPLE: VERY DIFFICULT

## 2022-05-20 ENCOUNTER — MYC MEDICAL ADVICE (OUTPATIENT)
Dept: FAMILY MEDICINE | Facility: CLINIC | Age: 32
End: 2022-05-20

## 2022-05-20 NOTE — TELEPHONE ENCOUNTER
Provider:  Please see MyChart message from the patient. Patient has seen your result note below.  Thank you. Tayla Talley R.N.

## 2022-05-26 ENCOUNTER — MYC MEDICAL ADVICE (OUTPATIENT)
Dept: FAMILY MEDICINE | Facility: CLINIC | Age: 32
End: 2022-05-26
Payer: COMMERCIAL

## 2022-05-26 DIAGNOSIS — R53.83 OTHER FATIGUE: Primary | ICD-10-CM

## 2022-05-26 NOTE — TELEPHONE ENCOUNTER
Provider:  Please place future thyroid lab orders and then close the chart.   Thank you. Tayla Talley R.N.    Generally normal results except TSH slightly low (but normal thyroid hormone T4). This should be repeated in 6months. Normal blood count.     Patient:  Tejas HOPSON Aribaltazar III     Provider:  Karan Waters MD MD  Please call/email with questions or concerns.

## 2022-06-02 ENCOUNTER — MYC REFILL (OUTPATIENT)
Dept: FAMILY MEDICINE | Facility: CLINIC | Age: 32
End: 2022-06-02
Payer: COMMERCIAL

## 2022-06-02 DIAGNOSIS — G44.219 EPISODIC TENSION-TYPE HEADACHE, NOT INTRACTABLE: ICD-10-CM

## 2022-06-02 DIAGNOSIS — F90.8 ATTENTION DEFICIT HYPERACTIVITY DISORDER (ADHD), OTHER TYPE: ICD-10-CM

## 2022-06-02 RX ORDER — BUTALBITAL, ACETAMINOPHEN AND CAFFEINE 50; 325; 40 MG/1; MG/1; MG/1
TABLET ORAL
Qty: 50 TABLET | Refills: 0 | Status: SHIPPED | OUTPATIENT
Start: 2022-06-03 | End: 2022-06-30

## 2022-06-02 RX ORDER — DEXTROAMPHETAMINE SACCHARATE, AMPHETAMINE ASPARTATE, DEXTROAMPHETAMINE SULFATE AND AMPHETAMINE SULFATE 7.5; 7.5; 7.5; 7.5 MG/1; MG/1; MG/1; MG/1
30 TABLET ORAL 2 TIMES DAILY
Qty: 60 TABLET | Refills: 0 | Status: SHIPPED | OUTPATIENT
Start: 2022-06-03 | End: 2022-06-30

## 2022-06-07 ENCOUNTER — MYC REFILL (OUTPATIENT)
Dept: FAMILY MEDICINE | Facility: CLINIC | Age: 32
End: 2022-06-07
Payer: COMMERCIAL

## 2022-06-07 DIAGNOSIS — F41.9 ANXIETY: ICD-10-CM

## 2022-06-07 RX ORDER — CLONAZEPAM 0.5 MG/1
TABLET, ORALLY DISINTEGRATING ORAL
Qty: 30 TABLET | Refills: 0 | Status: CANCELLED | OUTPATIENT
Start: 2022-06-07

## 2022-06-08 RX ORDER — CLONAZEPAM 0.5 MG/1
TABLET, ORALLY DISINTEGRATING ORAL
Qty: 30 TABLET | OUTPATIENT
Start: 2022-06-08

## 2022-06-08 RX ORDER — CLONAZEPAM 0.5 MG/1
0.5 TABLET ORAL 2 TIMES DAILY PRN
Qty: 20 TABLET | Refills: 0 | Status: SHIPPED | OUTPATIENT
Start: 2022-06-08 | End: 2022-06-26

## 2022-06-08 NOTE — TELEPHONE ENCOUNTER
Routing refill request to provider for review/approval because:  Drug not on the [facility] refill protocol     Daniella Fong RN

## 2022-06-24 DIAGNOSIS — F41.9 ANXIETY: ICD-10-CM

## 2022-06-24 NOTE — TELEPHONE ENCOUNTER
Routing refill request to provider for review/approval because:  Drug not on the [facility] refill protocol   Jil Castañeda RN

## 2022-06-26 RX ORDER — CLONAZEPAM 0.5 MG/1
TABLET ORAL
Qty: 20 TABLET | Refills: 0 | Status: SHIPPED | OUTPATIENT
Start: 2022-06-26 | End: 2022-07-22

## 2022-06-30 ENCOUNTER — TELEPHONE (OUTPATIENT)
Dept: FAMILY MEDICINE | Facility: CLINIC | Age: 32
End: 2022-06-30

## 2022-06-30 DIAGNOSIS — F90.8 ATTENTION DEFICIT HYPERACTIVITY DISORDER (ADHD), OTHER TYPE: ICD-10-CM

## 2022-06-30 DIAGNOSIS — G44.219 EPISODIC TENSION-TYPE HEADACHE, NOT INTRACTABLE: ICD-10-CM

## 2022-06-30 RX ORDER — DEXTROAMPHETAMINE SACCHARATE, AMPHETAMINE ASPARTATE, DEXTROAMPHETAMINE SULFATE AND AMPHETAMINE SULFATE 7.5; 7.5; 7.5; 7.5 MG/1; MG/1; MG/1; MG/1
30 TABLET ORAL 2 TIMES DAILY
Qty: 60 TABLET | Refills: 0 | Status: SHIPPED | OUTPATIENT
Start: 2022-07-01 | End: 2022-07-28

## 2022-06-30 RX ORDER — BUTALBITAL, ACETAMINOPHEN AND CAFFEINE 50; 325; 40 MG/1; MG/1; MG/1
TABLET ORAL
Qty: 50 TABLET | Refills: 0 | Status: SHIPPED | OUTPATIENT
Start: 2022-07-01 | End: 2022-07-29

## 2022-06-30 RX ORDER — BUTALBITAL, ACETAMINOPHEN AND CAFFEINE 50; 325; 40 MG/1; MG/1; MG/1
TABLET ORAL
Qty: 50 TABLET | OUTPATIENT
Start: 2022-06-30

## 2022-06-30 NOTE — TELEPHONE ENCOUNTER
Reason for Call:  Other prescription    Detailed comments: Adreall & another drug / headache medication    Phone Number Patient can be reached at: Cell number on file:    Telephone Information:   Mobile [phone]       Best Time: any    Can we leave a detailed message on this number? YES    Call taken on 6/30/2022 at 2:15 PM by Leda Appiah

## 2022-07-01 NOTE — TELEPHONE ENCOUNTER
Faxed RX for butalbital-acetaminophen-caffeine (ESGIC) -40 MG tablet to [facility] @ [phone].Deirdre Munoz MA/RAHEEL

## 2022-07-19 DIAGNOSIS — F41.9 ANXIETY: ICD-10-CM

## 2022-07-19 DIAGNOSIS — R00.2 PALPITATIONS: ICD-10-CM

## 2022-07-20 ENCOUNTER — ANCILLARY PROCEDURE (OUTPATIENT)
Dept: GENERAL RADIOLOGY | Facility: CLINIC | Age: 32
End: 2022-07-20
Attending: INTERNAL MEDICINE
Payer: COMMERCIAL

## 2022-07-20 ENCOUNTER — OFFICE VISIT (OUTPATIENT)
Dept: URGENT CARE | Facility: URGENT CARE | Age: 32
End: 2022-07-20

## 2022-07-20 VITALS
BODY MASS INDEX: 30.56 KG/M2 | DIASTOLIC BLOOD PRESSURE: 129 MMHG | TEMPERATURE: 98 F | HEART RATE: 105 BPM | SYSTOLIC BLOOD PRESSURE: 165 MMHG | RESPIRATION RATE: 18 BRPM | OXYGEN SATURATION: 100 % | WEIGHT: 238 LBS

## 2022-07-20 DIAGNOSIS — M79.641 PAIN OF RIGHT HAND: ICD-10-CM

## 2022-07-20 DIAGNOSIS — R10.84 ABDOMINAL PAIN, GENERALIZED: ICD-10-CM

## 2022-07-20 DIAGNOSIS — S62.001A CLOSED NONDISPLACED FRACTURE OF SCAPHOID OF RIGHT WRIST, UNSPECIFIED PORTION OF SCAPHOID, INITIAL ENCOUNTER: ICD-10-CM

## 2022-07-20 DIAGNOSIS — R10.84 ABDOMINAL PAIN, GENERALIZED: Primary | ICD-10-CM

## 2022-07-20 DIAGNOSIS — I10 PRIMARY HYPERTENSION: ICD-10-CM

## 2022-07-20 PROCEDURE — 74019 RADEX ABDOMEN 2 VIEWS: CPT | Mod: TC | Performed by: RADIOLOGY

## 2022-07-20 PROCEDURE — 99214 OFFICE O/P EST MOD 30 MIN: CPT | Performed by: INTERNAL MEDICINE

## 2022-07-20 PROCEDURE — 73130 X-RAY EXAM OF HAND: CPT | Mod: TC | Performed by: RADIOLOGY

## 2022-07-20 RX ORDER — POLYETHYLENE GLYCOL 3350 17 G/17G
1 POWDER, FOR SOLUTION ORAL DAILY
Qty: 850 G | Refills: 0 | Status: SHIPPED | OUTPATIENT
Start: 2022-07-20

## 2022-07-20 NOTE — PROGRESS NOTES
SUBJECTIVE:  Tejas Villalba III is an 31 year old male who presents for concern for possible hernia.  Started having some pain in area under belly button.  Some tenderness like a bruise and would radiate into scrotum.  Feels some pressure in the affected area from his belt.  Does heavy lifting in his job a lot. Not aware of any specific moment when had sudden pain.  Occasionally over the past year has felt some little pops when working in that affected area.  No fevers, chills, sweats.  Mild nausea from some discomfort, no vomiting.  No swelling in scrotum. Area below belly button feels full and achy and a little tender.    PMH:   has a past medical history of ADHD and HTN.  Patient Active Problem List   Diagnosis     CARDIOVASCULAR SCREENING; LDL GOAL LESS THAN 160     BMI 30.0-30.9,adult     ADD (attention deficit disorder)     Generalized anxiety disorder     Intermittent explosive disorder     Mid back pain     Intractable chronic migraine without aura and without status migrainosus     Major depressive disorder, recurrent episode, mild (H)     Chronic bilateral low back pain with right-sided sciatica     Attention deficit hyperactivity disorder (ADHD), other type     Scalp cyst     Primary hypertension     Social History     Socioeconomic History     Marital status: Single     Spouse name: None     Number of children: None     Years of education: None     Highest education level: None   Tobacco Use     Smoking status: Current Every Day Smoker     Packs/day: 0.50     Years: 10.00     Pack years: 5.00     Last attempt to quit: 2010     Years since quittin.9     Smokeless tobacco: Never Used   Substance and Sexual Activity     Alcohol use: Yes     Comment: OCC     Drug use: No     Sexual activity: Yes     Partners: Female     Family History   Problem Relation Age of Onset     Diabetes Father        ALLERGIES:  Amoxicillin, Ceclor [cefaclor], Codeine, Erythromycin, Septra [bactrim], and Suprax  [cefixime]    Current Outpatient Medications   Medication     amphetamine-dextroamphetamine (ADDERALL) 30 MG tablet     butalbital-acetaminophen-caffeine (ESGIC) -40 MG tablet     clonazePAM (KLONOPIN) 0.5 MG ODT     clonazePAM (KLONOPIN) 0.5 MG tablet     cyclobenzaprine (FLEXERIL) 10 MG tablet     IBUPROFEN PO     methocarbamol (ROBAXIN) 500 MG tablet     polyethylene glycol (MIRALAX) 17 GM/Dose powder     propranolol (INDERAL) 20 MG tablet     calcium carbonate 1250 (500 Ca) MG CHEW     gabapentin (NEURONTIN) 300 MG capsule     hydrOXYzine (VISTARIL) 25 MG capsule     nicotine (NICORETTE) 2 MG gum     sertraline (ZOLOFT) 100 MG tablet     traZODone (DESYREL) 50 MG tablet     No current facility-administered medications for this visit.         ROS:  ROS is done and is negative for general/constitutional, eye, ENT, Respiratory, cardiovascular, GI, , Skin, musculoskeletal except as noted elsewhere.  All other review of systems negative except as noted elsewhere.      OBJECTIVE:  BP (!) 165/129   Pulse 105   Temp 98  F (36.7  C) (Tympanic)   Resp 18   Wt 108 kg (238 lb)   SpO2 100%   BMI 30.56 kg/m    GENERAL APPEARANCE: Alert, in no acute distress  EYES: normal  NOSE:normal  OROPHARYNX:normal  NECK:No adenopathy,masses or thyromegaly  RESP: normal and clear to auscultation  CV:regular rate and rhythm and no murmurs, clicks, or gallops  ABDOMEN: Abdomen soft.  Mild diffuse tenderness, no rebound, mild fullness in mid lower abdomen BS normal. No masses, organomegaly  : normal external genitalia, no edema of scrotum, normal testicles nontender, no inguinal hernia palpable, no groin tenderness.  SKIN: no ulcers, lesions or rash  MUSCULOSKELETAL:mildly decreased rom and mild pain of right wrist.      RESULTS  .  Recent Results (from the past 48 hour(s))   XR Abdomen 2 Views    Narrative    EXAM: XR ABDOMEN 2 VIEWS  LOCATION: [facility]  DATE/TIME: 7/20/2022 6:45 PM    INDICATION:   Abdominal pain, generalized  COMPARISON: None.      Impression    IMPRESSION: No free air. Nonobstructive bowel gas pattern. Large amount of formed stool in the colon.    XR Hand Right G/E 3 Views    Narrative    EXAM: XR HAND RIGHT G/E 3 VIEWS  [facility]  DATE/TIME: 7/20/2022 6:45 PM    INDICATION: Right hand pain.  COMPARISON: None.      Impression    IMPRESSION: Chronic appearing ununited scaphoid waist fracture, without significant displacement. Cystic lucency overlying the lunate. No acute fracture identified. Normal joint alignment with maintained joint spacing. Normal soft tissues.       ASSESSMENT/PLAN:    ASSESSMENT / PLAN:  (R10.84) Abdominal pain, generalized  (primary encounter diagnosis)  Comment: pt has signifncant stool in colon on xray c/w constipation which is likely a cause of his sxs. pt does have risk factors for hernia, so referred to surgeon for further eval if not improve with tx of constipation.    Plan: XR Abdomen 2 Views, polyethylene glycol         (MIRALAX) 17 GM/Dose powder, Adult General Surg        Referral        Reviewed medication instructions and side effects. Follow up if experiences side effects.. I reviewed supportive care, otc meds to use if needed, expected course, and signs of concern.  Follow up with surgeon for hernia eval if he does not improve within 1 week(s) or if worsens in any way.  Reviewed red flag symptoms and is to go to the ER if experiences any of these.    (I10) Primary hypertension  Comment: may be above goal due to pain  Plan: Recheck BP in 1-2 weeks with a nurse visit, [facility] pharmacy visit, or a visit to primary care doctor.    (M79.641) Pain of right hand  Comment: chronic non-union fracture noted  Plan: XR Hand Right G/E 3 Views        Refer to delia    (S62.001A) Closed nondisplaced fracture of scaphoid of right wrist, unspecified portion of scaphoid, initial encounter  Comment: from old injury a couple years ago but  having worsened pain and function  Plan: Orthopedic  Referral        Refer to ortho for further eval and tx.      See [facility] for orders, medications, letters, patient instructions    Marita Viera M.D.

## 2022-07-21 DIAGNOSIS — F41.9 ANXIETY: ICD-10-CM

## 2022-07-21 RX ORDER — PROPRANOLOL HYDROCHLORIDE 20 MG/1
TABLET ORAL
Qty: 90 TABLET | Refills: 1 | Status: SHIPPED | OUTPATIENT
Start: 2022-07-21 | End: 2022-09-06

## 2022-07-21 NOTE — PATIENT INSTRUCTIONS
Take miralax full dose daily for 2-3 days.  Then, if stools are too loose, decrease to 1/2 dose daily.  If stools are still too loose, then decrease to 1/2 dose every other day until stools are no longer too loose.  You may take a full dose of miralax daily if needed, and can take it for as long as needed.    You need to take at least 1/2 dose twice a week for a month to allow time for your bowel to recover and return to normal function.     Ousmane to follow up with Primary Care provider regarding elevated blood pressure.

## 2022-07-22 RX ORDER — CLONAZEPAM 0.5 MG/1
TABLET ORAL
Qty: 20 TABLET | Refills: 0 | Status: SHIPPED | OUTPATIENT
Start: 2022-07-22 | End: 2023-02-17

## 2022-07-27 ENCOUNTER — OFFICE VISIT (OUTPATIENT)
Dept: ORTHOPEDICS | Facility: CLINIC | Age: 32
End: 2022-07-27
Payer: COMMERCIAL

## 2022-07-27 VITALS
DIASTOLIC BLOOD PRESSURE: 99 MMHG | SYSTOLIC BLOOD PRESSURE: 145 MMHG | HEIGHT: 74 IN | BODY MASS INDEX: 30.54 KG/M2 | WEIGHT: 238 LBS

## 2022-07-27 DIAGNOSIS — S62.021K: Primary | ICD-10-CM

## 2022-07-27 PROCEDURE — 99203 OFFICE O/P NEW LOW 30 MIN: CPT | Performed by: ORTHOPAEDIC SURGERY

## 2022-07-27 ASSESSMENT — PAIN SCALES - GENERAL: PAINLEVEL: MODERATE PAIN (5)

## 2022-07-27 NOTE — PROGRESS NOTES
Chief Complaint:   Chief Complaint   Patient presents with     Right Hand - Pain     Right scaphoid waist fracture. Onset: 3-4 years ago. He thinks it happened when he fell off a hammock while drinking. He is right handed, but he writes with his left hand. Pain is getting worse with pushing, grasping or twisting the hand/wrist.         HPI: Tejas Villalba III is a 31 year old male , right -hand dominant (but writes with left hand), who presents for evaluation and management of a right wrist pain. Injury 3-4 years ago, he thinks when he fell off of a hammock while having a few drinks. Since then, pain is progressing with pushing, grasping, twisting activities. Locates pain along the base of the thumb/wrist area.      He reports having mild pain/discomfort around the wrist site. He denies associated numbness or tingling. He denies any other injuries to his upper extremity.   Symptoms: pain.  Location of symptoms: radial right wrist.  Pain severity: 4/10 to 5/10.  Pain quality: aching, sharp and shooting  Frequency of symptoms: are constant  Aggravating factors: pushing, twisting, squeezing.  Relieving factors: rest, brace, tylenol, ibuprofen.    Previous treatment: brace    Past medical history:  has a past medical history of ADHD and HTN.   Patient Active Problem List    Diagnosis Date Noted     Primary hypertension 07/20/2022     Priority: Medium     Scalp cyst 03/26/2021     Priority: Medium     Added automatically from request for surgery [id number]       Attention deficit hyperactivity disorder (ADHD), other type 06/30/2017     Priority: Medium     Chronic bilateral low back pain with right-sided sciatica 07/07/2016     Priority: Medium     Intractable chronic migraine without aura and without status migrainosus 04/19/2016     Priority: Medium     Major depressive disorder, recurrent episode, mild (H) 04/19/2016     Priority: Medium     Mid back pain 09/24/2015     Priority: Medium     Intermittent explosive  disorder 11/06/2013     Priority: Medium     Generalized anxiety disorder 12/11/2012     Priority: Medium     Diagnosis updated by automated process. Provider to review and confirm.       BMI 30.0-30.9,adult 07/27/2011     Priority: Medium     ADD (attention deficit disorder) 07/27/2011     Priority: Medium     CARDIOVASCULAR SCREENING; LDL GOAL LESS THAN 160 10/31/2010     Priority: Medium     Past surgical history:  has a past surgical history that includes pe tubes.     Medications:    Current Outpatient Medications   Medication Sig Dispense Refill     amphetamine-dextroamphetamine (ADDERALL) 30 MG tablet Take 1 tablet (30 mg) by mouth 2 times daily Stop the adderall XR 60 tablet 0     butalbital-acetaminophen-caffeine (ESGIC) -40 MG tablet TAKE 1 TABLET BY MOUTH EVERY 12 HOURS AS NEEDED. AVOID WITH KLONOPIN/ CLONAZEPAM. MAX 50 PER MONTH 50 tablet 0     calcium carbonate 1250 (500 Ca) MG CHEW Take 500 mg by mouth  (Patient not taking: No sig reported)       clonazePAM (KLONOPIN) 0.5 MG ODT DISSOLVE 1 TABLET(0.5 MG) ON THE TONGUE TWICE DAILY AS NEEDED FOR ANXIETY 30 tablet 0     clonazePAM (KLONOPIN) 0.5 MG tablet TAKE 1 TABLET(0.5 MG) BY MOUTH TWICE DAILY AS NEEDED FOR ANXIETY 20 tablet 0     cyclobenzaprine (FLEXERIL) 10 MG tablet TAKE 1/2 TO 1 TABLET(5 TO 10 MG) BY MOUTH EVERY NIGHT AS NEEDED FOR MUSCLE SPASMS 30 tablet 1     gabapentin (NEURONTIN) 300 MG capsule TAKE 1 CAPSULE(300 MG) BY MOUTH EVERY NIGHT AS NEEDED FOR SLEEP OR BACK PAIN (Patient not taking: No sig reported) 90 capsule 0     hydrOXYzine (VISTARIL) 25 MG capsule TAKE 1 TO 2 CAPSULES BY MOUTH AT BEDTIME AS NEEDED FOR SLEEP OR ANXIETY (Patient not taking: No sig reported) 30 capsule 1     IBUPROFEN PO Take 600 mg by mouth 2 times daily As needed       methocarbamol (ROBAXIN) 500 MG tablet TAKE 1 TO 2 TABLETS BY MOUTH DAILY DURING THE DAY AS NEEDED FOR MUSCLE SPASMS 40 tablet 1     nicotine (NICORETTE) 2 MG gum PLACE 1 PIECE OF GUM IN CHEEK  "AS NEEDED FOR SMOKING CESSATION (Patient not taking: No sig reported) 100 each 3     polyethylene glycol (MIRALAX) 17 GM/Dose powder Take 17 g (1 capful) by mouth daily 850 g 0     propranolol (INDERAL) 20 MG tablet TAKE 1 TABLET(20 MG) BY MOUTH THREE TIMES DAILY AS NEEDED FOR ANXIETY OR HIGH BLOOD PRESSURE. MAY DOUBLE DOSAGE AS NEEDED 90 tablet 1     sertraline (ZOLOFT) 100 MG tablet TAKE 1.5 TABLETS BY MOUTH FOR DEPRESSION AND ANXIETY. (Patient not taking: No sig reported) 135 tablet 1     traZODone (DESYREL) 50 MG tablet TAKE 1 TABLET(50 MG) BY MOUTH EVERY NIGHT AS NEEDED FOR SLEEP. DO NOT TAKE WITH FLEXERIL (Patient not taking: No sig reported) 30 tablet 3        Allergies:     Allergies   Allergen Reactions     Amoxicillin      Ceclor [Cefaclor]      Codeine Itching     Erythromycin      Septra [Bactrim]      Suprax [Cefixime]         Family History: family history includes Diabetes in his father.     Social History:  reports that he has been smoking. He has a 5.00 pack-year smoking history. He has never used smokeless tobacco. He reports current alcohol use. He reports that he does not use drugs.    Review of Systems:  ROS: 10 point ROS neg other than the symptoms noted above in the HPI and past medical history.    Physical Exam  GENERAL APPEARANCE: healthy, alert, no distress.   SKIN: no suspicious lesions or rashes  NEURO: Normal strength and tone, mentation intact and speech normal  PSYCH:  mentation appears normal and affect normal. Not anxious.  RESPIRATORY: No increased work of breathing.    BP (!) 145/99   Ht 1.88 m (6' 2\")   Wt 108 kg (238 lb)   BMI 30.56 kg/m       right HAND / WRIST EXAM:    Skin intact. No abnormal skin discoloration, erythema or ecchymosis.   Normal wear pattern, color and tone.  No observable or palpable masses of the fingers or palm or wrist.  No palpable triggering of fingers.   No observable or palpable cords or nodules of the fingers or palm.    There is no swelling in the " wrist.  There is moderate tenderness in the distal ulna, TFCC, scaphoid, snuff box of the wrist.  There is no ecchymosis.  There is no erythema of the surrounding skin.  There is no maceration of the skin.  There is no gross deformity in the area.  Intact extensors. No extensor lag.    1st carpometacarpal grind: negative    Intact sensation light touch median, radial, ulnar nerves of the hand  Intact sensation to the radial and ulnar digital nerves of the fingers, as well as the finger tips.  Intact epl fpl fdp edc wrist flexion/extension biceps/triceps deltoid  Brisk capillary refill to all fingers.   Palpable radial pulse, 2+.    X-rays:  3 views right hand from 7/20/2022 were reviewed in clinic today:  Chronic appearing ununited scaphoid waist fracture, without significant displacement. Cystic lucency overlying the lunate. No acute fracture identified. Normal joint alignment with maintained joint spacing. Normal soft tissues..    Assessment: 30yo RHD male with chronic right wrist pain, displaced scaphoid fracture nonunion.    Plan:  * exam, images reviewed  * pain likely due to old scaphoid fracture that was never treated, and has now gone onto nonhealing and is displaced  * notable problem for him and his wrist at a young age  * recommend referral to hand surgery sub-specialist to discuss options for this  * in the meantime, recommend thumb spica bracing, activity modification, over the counter pain control, ice/heat, topical ointments, as needed  * return to clinic as needed.    * All questions were addressed and answered prior to discharge from clinic today. The patient acknowledges an understanding of and agreement with the plan set forth during today's visit. Patient was advised to call our office or MyChart us if any further questions arise upon leaving our office today.          Sudheer Jennings M.D., M.S.  Dept. of Orthopaedic Surgery  [facility]

## 2022-07-27 NOTE — LETTER
7/27/2022         RE: Tejas Villalba III  [id number] [address] [facility] [address] [facility] [address]        Dear Colleague,    Thank you for referring your patient, Tejas Villalba III, to the [facility]. Please see a copy of my visit note below.    Chief Complaint:   Chief Complaint   Patient presents with     Right Hand - Pain     Right scaphoid waist fracture. Onset: 3-4 years ago. He thinks it happened when he fell off a hammock while drinking. He is right handed, but he writes with his left hand. Pain is getting worse with pushing, grasping or twisting the hand/wrist.         HPI: Tejas Villalba III is a 31 year old male , right -hand dominant (but writes with left hand), who presents for evaluation and management of a right wrist pain. Injury 3-4 years ago, he thinks when he fell off of a hammock while having a few drinks. Since then, pain is progressing with pushing, grasping, twisting activities. Locates pain along the base of the thumb/wrist area.      He reports having mild pain/discomfort around the wrist site. He denies associated numbness or tingling. He denies any other injuries to his upper extremity.   Symptoms: pain.  Location of symptoms: radial right wrist.  Pain severity: 4/10 to 5/10.  Pain quality: aching, sharp and shooting  Frequency of symptoms: are constant  Aggravating factors: pushing, twisting, squeezing.  Relieving factors: rest, brace, tylenol, ibuprofen.    Previous treatment: brace    Past medical history:  has a past medical history of ADHD and HTN.   Patient Active Problem List    Diagnosis Date Noted     Primary hypertension 07/20/2022     Priority: Medium     Scalp cyst 03/26/2021     Priority: Medium     Added automatically from request for surgery [id number]       Attention deficit hyperactivity disorder (ADHD), other type 06/30/2017     Priority: Medium     Chronic bilateral low back pain with right-sided sciatica 07/07/2016     Priority:  Medium     Intractable chronic migraine without aura and without status migrainosus 04/19/2016     Priority: Medium     Major depressive disorder, recurrent episode, mild (H) 04/19/2016     Priority: Medium     Mid back pain 09/24/2015     Priority: Medium     Intermittent explosive disorder 11/06/2013     Priority: Medium     Generalized anxiety disorder 12/11/2012     Priority: Medium     Diagnosis updated by automated process. Provider to review and confirm.       BMI 30.0-30.9,adult 07/27/2011     Priority: Medium     ADD (attention deficit disorder) 07/27/2011     Priority: Medium     CARDIOVASCULAR SCREENING; LDL GOAL LESS THAN 160 10/31/2010     Priority: Medium     Past surgical history:  has a past surgical history that includes pe tubes.     Medications:    Current Outpatient Medications   Medication Sig Dispense Refill     amphetamine-dextroamphetamine (ADDERALL) 30 MG tablet Take 1 tablet (30 mg) by mouth 2 times daily Stop the adderall XR 60 tablet 0     butalbital-acetaminophen-caffeine (ESGIC) -40 MG tablet TAKE 1 TABLET BY MOUTH EVERY 12 HOURS AS NEEDED. AVOID WITH KLONOPIN/ CLONAZEPAM. MAX 50 PER MONTH 50 tablet 0     calcium carbonate 1250 (500 Ca) MG CHEW Take 500 mg by mouth  (Patient not taking: No sig reported)       clonazePAM (KLONOPIN) 0.5 MG ODT DISSOLVE 1 TABLET(0.5 MG) ON THE TONGUE TWICE DAILY AS NEEDED FOR ANXIETY 30 tablet 0     clonazePAM (KLONOPIN) 0.5 MG tablet TAKE 1 TABLET(0.5 MG) BY MOUTH TWICE DAILY AS NEEDED FOR ANXIETY 20 tablet 0     cyclobenzaprine (FLEXERIL) 10 MG tablet TAKE 1/2 TO 1 TABLET(5 TO 10 MG) BY MOUTH EVERY NIGHT AS NEEDED FOR MUSCLE SPASMS 30 tablet 1     gabapentin (NEURONTIN) 300 MG capsule TAKE 1 CAPSULE(300 MG) BY MOUTH EVERY NIGHT AS NEEDED FOR SLEEP OR BACK PAIN (Patient not taking: No sig reported) 90 capsule 0     hydrOXYzine (VISTARIL) 25 MG capsule TAKE 1 TO 2 CAPSULES BY MOUTH AT BEDTIME AS NEEDED FOR SLEEP OR ANXIETY (Patient not taking: No sig  "reported) 30 capsule 1     IBUPROFEN PO Take 600 mg by mouth 2 times daily As needed       methocarbamol (ROBAXIN) 500 MG tablet TAKE 1 TO 2 TABLETS BY MOUTH DAILY DURING THE DAY AS NEEDED FOR MUSCLE SPASMS 40 tablet 1     nicotine (NICORETTE) 2 MG gum PLACE 1 PIECE OF GUM IN CHEEK AS NEEDED FOR SMOKING CESSATION (Patient not taking: No sig reported) 100 each 3     polyethylene glycol (MIRALAX) 17 GM/Dose powder Take 17 g (1 capful) by mouth daily 850 g 0     propranolol (INDERAL) 20 MG tablet TAKE 1 TABLET(20 MG) BY MOUTH THREE TIMES DAILY AS NEEDED FOR ANXIETY OR HIGH BLOOD PRESSURE. MAY DOUBLE DOSAGE AS NEEDED 90 tablet 1     sertraline (ZOLOFT) 100 MG tablet TAKE 1.5 TABLETS BY MOUTH FOR DEPRESSION AND ANXIETY. (Patient not taking: No sig reported) 135 tablet 1     traZODone (DESYREL) 50 MG tablet TAKE 1 TABLET(50 MG) BY MOUTH EVERY NIGHT AS NEEDED FOR SLEEP. DO NOT TAKE WITH FLEXERIL (Patient not taking: No sig reported) 30 tablet 3        Allergies:     Allergies   Allergen Reactions     Amoxicillin      Ceclor [Cefaclor]      Codeine Itching     Erythromycin      Septra [Bactrim]      Suprax [Cefixime]         Family History: family history includes Diabetes in his father.     Social History:  reports that he has been smoking. He has a 5.00 pack-year smoking history. He has never used smokeless tobacco. He reports current alcohol use. He reports that he does not use drugs.    Review of Systems:  ROS: 10 point ROS neg other than the symptoms noted above in the HPI and past medical history.    Physical Exam  GENERAL APPEARANCE: healthy, alert, no distress.   SKIN: no suspicious lesions or rashes  NEURO: Normal strength and tone, mentation intact and speech normal  PSYCH:  mentation appears normal and affect normal. Not anxious.  RESPIRATORY: No increased work of breathing.    BP (!) 145/99   Ht 1.88 m (6' 2\")   Wt 108 kg (238 lb)   BMI 30.56 kg/m       right HAND / WRIST EXAM:    Skin intact. No abnormal " skin discoloration, erythema or ecchymosis.   Normal wear pattern, color and tone.  No observable or palpable masses of the fingers or palm or wrist.  No palpable triggering of fingers.   No observable or palpable cords or nodules of the fingers or palm.    There is no swelling in the wrist.  There is moderate tenderness in the distal ulna, TFCC, scaphoid, snuff box of the wrist.  There is no ecchymosis.  There is no erythema of the surrounding skin.  There is no maceration of the skin.  There is no gross deformity in the area.  Intact extensors. No extensor lag.    1st carpometacarpal grind: negative    Intact sensation light touch median, radial, ulnar nerves of the hand  Intact sensation to the radial and ulnar digital nerves of the fingers, as well as the finger tips.  Intact epl fpl fdp edc wrist flexion/extension biceps/triceps deltoid  Brisk capillary refill to all fingers.   Palpable radial pulse, 2+.    X-rays:  3 views right hand from 7/20/2022 were reviewed in clinic today:  Chronic appearing ununited scaphoid waist fracture, without significant displacement. Cystic lucency overlying the lunate. No acute fracture identified. Normal joint alignment with maintained joint spacing. Normal soft tissues..    Assessment: 30yo RHD male with chronic right wrist pain, displaced scaphoid fracture nonunion.    Plan:  * exam, images reviewed  * pain likely due to old scaphoid fracture that was never treated, and has now gone onto nonhealing and is displaced  * notable problem for him and his wrist at a young age  * recommend referral to hand surgery sub-specialist to discuss options for this  * in the meantime, recommend thumb spica bracing, activity modification, over the counter pain control, ice/heat, topical ointments, as needed  * return to clinic as needed.    * All questions were addressed and answered prior to discharge from clinic today. The patient acknowledges an understanding of and agreement with the plan  set forth during today's visit. Patient was advised to call our office or MyChart us if any further questions arise upon leaving our office today.          Sudheer Jennings M.D., M.S.  Dept. of Orthopaedic Surgery  [facility]        Again, thank you for allowing me to participate in the care of your patient.        Sincerely,        Sudheer Jennings MD

## 2022-07-28 ENCOUNTER — VIRTUAL VISIT (OUTPATIENT)
Dept: FAMILY MEDICINE | Facility: CLINIC | Age: 32
End: 2022-07-28
Payer: COMMERCIAL

## 2022-07-28 DIAGNOSIS — F90.8 ATTENTION DEFICIT HYPERACTIVITY DISORDER (ADHD), OTHER TYPE: ICD-10-CM

## 2022-07-28 DIAGNOSIS — M79.641 PAIN OF RIGHT HAND: Primary | ICD-10-CM

## 2022-07-28 DIAGNOSIS — G44.219 EPISODIC TENSION-TYPE HEADACHE, NOT INTRACTABLE: ICD-10-CM

## 2022-07-28 PROCEDURE — 99213 OFFICE O/P EST LOW 20 MIN: CPT | Mod: 95 | Performed by: PHYSICIAN ASSISTANT

## 2022-07-28 RX ORDER — DEXTROAMPHETAMINE SACCHARATE, AMPHETAMINE ASPARTATE, DEXTROAMPHETAMINE SULFATE AND AMPHETAMINE SULFATE 7.5; 7.5; 7.5; 7.5 MG/1; MG/1; MG/1; MG/1
30 TABLET ORAL 2 TIMES DAILY
Qty: 30 TABLET | Refills: 0 | Status: SHIPPED | OUTPATIENT
Start: 2022-07-28 | End: 2022-08-09

## 2022-07-28 NOTE — PROGRESS NOTES
Ousmane is a 31 year old who is being evaluated via a billable video visit.      How would you like to obtain your AVS? MyChart  If the video visit is dropped, the invitation should be resent by: Text to cell phone: [phone]  Will anyone else be joining your video visit? No          Assessment & Plan       ICD-10-CM    1. Pain of right hand  M79.641 diclofenac (VOLTAREN) 50 MG EC tablet   2. Attention deficit hyperactivity disorder (ADHD), other type  F90.8 amphetamine-dextroamphetamine (ADDERALL) 30 MG tablet     1.  Patient can try diclofenac twice a day as needed he will he wear a wrist brace.  He will keep his follow-up appointment with the hand specialist.  2.  I will refill a short supply in the absence of his primary provider we will reschedule him for a follow-up appointment next week with his primary provider for continued care    Return in about 1 week (around 8/4/2022) for recheck.    Gilmar Vargas PA-C  [facility]   Ousmane is a 31 year old accompanied by his self, presenting for the following health issues:  Pain      HPI     Hand painful recheck see a surgeon Need pain med  He had an injury couple years ago was seen in urgent care and then by orthopedist.  He is can be referred to hand specialist but can consider September.  He has a lot of pain with activities he is hardly can sleep at night.  He is looking for something stronger for pain management.    His primary provider is not available this week he also needs refill of his Adderall.        Review of Systems   Constitutional, HEENT, cardiovascular, pulmonary, gi and gu systems are negative, except as otherwise noted.      Objective             Physical Exam   GENERAL: Healthy, alert and no distress  EYES: Eyes grossly normal to inspection.  No discharge or erythema, or obvious scleral/conjunctival abnormalities.  RESP: No audible wheeze, cough, or visible cyanosis.  No visible retractions or increased work  of breathing.    SKIN: Visible skin clear. No significant rash, abnormal pigmentation or lesions.  NEURO: Cranial nerves grossly intact.  Mentation and speech appropriate for age.  PSYCH: Mentation appears normal, affect normal/bright, judgement and insight intact, normal speech and appearance well-groomed.                Video-Visit Details    Video Start Time: 5:02 PM    Type of service:  Video Visit    Video End Time:5:15 PM    Originating Location (pt. Location): car    Distant Location (provider location):  [facility]     Platform used for Video Visit: Quaero    Saroj Campo

## 2022-07-28 NOTE — TELEPHONE ENCOUNTER
DIAGNOSIS: Closed nondisplaced fracture of scaphoid of right wrist, unspecified portion of scaphoid, initial encounter   APPOINTMENT DATE: 09/02/2022   NOTES STATUS DETAILS   OFFICE NOTE from referring provider Internal 07/27/2022 Dr Jennings [facility]    OFFICE NOTE from other specialist Internal 07/20/2022 Dr Viera [facility]    DISCHARGE SUMMARY from hospital N/A    DISCHARGE REPORT from the ER N/A    OPERATIVE REPORT N/A    MEDICATION LIST N/A    EMG (for Spine) N/A    IMPLANT RECORD/STICKER N/A    LABS     CBC/DIFF N/A    CULTURES N/A    INJECTIONS DONE IN RADIOLOGY N/A    MRI N/A    CT SCAN N/A    XRAYS (IMAGES & REPORTS) Internal 07/20/2022 RT hand  01/17/2014 RT hand   TUMOR     PATHOLOGY  Slides & report N/A

## 2022-07-29 RX ORDER — BUTALBITAL, ACETAMINOPHEN AND CAFFEINE 50; 325; 40 MG/1; MG/1; MG/1
TABLET ORAL
Qty: 50 TABLET | Refills: 0 | Status: SHIPPED | OUTPATIENT
Start: 2022-07-29 | End: 2022-09-06

## 2022-08-04 NOTE — PROGRESS NOTES
Ousmane is a 31 year old who is being evaluated via a billable video visit.    Follow-up hand fracture - seen by ortho  Follow-up adhd, anxiety, migraines, tobacco abuse, insomnia and chronic pain issues.  adderall BID work. Outside blood pressure cuff at dad's house. Propranolol helpful. Klonopin prn for sleep or anxiety.   Dating a couple dates.   Seeing hand specialist in one month.   Using wrist splint at bedtime. Tramadol - side effects.   diclofenac  How would you like to obtain your AVS? MyChart  If the video visit is dropped, the invitation should be resent by: Text to cell phone: [phone]  Will anyone else be joining your video visit? No      ASSESSMENT / PLAN:  (H98.376) Pain of right hand  (primary encounter diagnosis)    Plan: oxyCODONE (ROXICODONE) 5 MG tablet        Follow-up hand specialist next month and continue prn wrist splint. P.t./injection/bone graft? Patient would like limited oxycodone -helpful in past. Avoid within 8 hours of klonopin and avoid wth ALCOHOL or driving. Continue prn diclofenac.     (F41.9) Anxiety  Comment: stable  Plan: limit caffeine/ ALCOHOL. If SUICIAL IDEATION OR HOMOCIDAL IDEATION OR BERNADETTE TO ER. .Recheck in 3 months  Sooner if worse. .Call/email with questions/concerns     (K29.70) Gastritis without bleeding, unspecified chronicity, unspecified gastritis type  Comment: high risk with diclofenac  Plan: omeprazole (PRILOSEC) 20 MG DR capsule        Reveiwed risks and side effects of medication      (F32.2) Major depressive disorder, single episode, severe without psychotic features (H)  Comment: stable  Plan: if SUICIAL IDEATION OR HOMOCIDAL IDEATION OR BERNADETTE TO ER        Subjective   Ousmane is a 31 year old, presenting for the following health issues:  No chief complaint on file.      HPI   Recheck Hand- Needs pain medication      Review of Systems         Objective           Vitals:  No vitals were obtained today due to virtual visit.    Physical Exam   GENERAL: Healthy,  alert and no distress  EYES: Eyes grossly normal to inspection.  No discharge or erythema, or obvious scleral/conjunctival abnormalities.  RESP: No audible wheeze, cough, or visible cyanosis.  No visible retractions or increased work of breathing.    SKIN: Visible skin clear. No significant rash, abnormal pigmentation or lesions.  NEURO: Cranial nerves grossly intact.  Mentation and speech appropriate for age.  PSYCH: Mentation appears normal, affect normal/bright, judgement and insight intact, normal speech and appearance well-groomed.        Video-Visit Details    Video Start Time: 2:21 PM    Type of service:  Video Visit    Video End Time:2:31 PM    Originating Location (pt. Location): Home    Distant Location (provider location):  [facility]     Platform used for Video Visit: Volvant    .  ..  Answers for HPI/ROS submitted by the patient on 8/5/2022  If you checked off any problems, how difficult have these problems made it for you to do your work, take care of things at home, or get along with other people?: Somewhat difficult  PHQ9 TOTAL SCORE: 4

## 2022-08-05 ENCOUNTER — VIRTUAL VISIT (OUTPATIENT)
Dept: FAMILY MEDICINE | Facility: CLINIC | Age: 32
End: 2022-08-05
Payer: COMMERCIAL

## 2022-08-05 DIAGNOSIS — F41.9 ANXIETY: ICD-10-CM

## 2022-08-05 DIAGNOSIS — F32.2 MAJOR DEPRESSIVE DISORDER, SINGLE EPISODE, SEVERE WITHOUT PSYCHOTIC FEATURES (H): ICD-10-CM

## 2022-08-05 DIAGNOSIS — M79.641 PAIN OF RIGHT HAND: Primary | ICD-10-CM

## 2022-08-05 DIAGNOSIS — K29.70 GASTRITIS WITHOUT BLEEDING, UNSPECIFIED CHRONICITY, UNSPECIFIED GASTRITIS TYPE: ICD-10-CM

## 2022-08-05 PROCEDURE — 99214 OFFICE O/P EST MOD 30 MIN: CPT | Mod: 95 | Performed by: FAMILY MEDICINE

## 2022-08-05 RX ORDER — OXYCODONE HYDROCHLORIDE 5 MG/1
TABLET ORAL
Qty: 15 TABLET | Refills: 0 | Status: SHIPPED | OUTPATIENT
Start: 2022-08-05 | End: 2022-08-22

## 2022-08-05 ASSESSMENT — PATIENT HEALTH QUESTIONNAIRE - PHQ9
10. IF YOU CHECKED OFF ANY PROBLEMS, HOW DIFFICULT HAVE THESE PROBLEMS MADE IT FOR YOU TO DO YOUR WORK, TAKE CARE OF THINGS AT HOME, OR GET ALONG WITH OTHER PEOPLE: SOMEWHAT DIFFICULT
SUM OF ALL RESPONSES TO PHQ QUESTIONS 1-9: 4
SUM OF ALL RESPONSES TO PHQ QUESTIONS 1-9: 4

## 2022-08-09 ENCOUNTER — MYC REFILL (OUTPATIENT)
Dept: FAMILY MEDICINE | Facility: CLINIC | Age: 32
End: 2022-08-09

## 2022-08-09 DIAGNOSIS — F90.8 ATTENTION DEFICIT HYPERACTIVITY DISORDER (ADHD), OTHER TYPE: ICD-10-CM

## 2022-08-10 RX ORDER — DEXTROAMPHETAMINE SACCHARATE, AMPHETAMINE ASPARTATE, DEXTROAMPHETAMINE SULFATE AND AMPHETAMINE SULFATE 7.5; 7.5; 7.5; 7.5 MG/1; MG/1; MG/1; MG/1
30 TABLET ORAL 2 TIMES DAILY
Qty: 60 TABLET | Refills: 0 | Status: SHIPPED | OUTPATIENT
Start: 2022-08-11 | End: 2022-09-06

## 2022-08-22 ENCOUNTER — MYC REFILL (OUTPATIENT)
Dept: FAMILY MEDICINE | Facility: CLINIC | Age: 32
End: 2022-08-22

## 2022-08-22 DIAGNOSIS — M79.641 PAIN OF RIGHT HAND: ICD-10-CM

## 2022-08-22 RX ORDER — OXYCODONE HYDROCHLORIDE 5 MG/1
TABLET ORAL
Qty: 14 TABLET | Refills: 0 | Status: SHIPPED | OUTPATIENT
Start: 2022-08-22 | End: 2022-09-02

## 2022-08-26 DIAGNOSIS — F41.9 ANXIETY: ICD-10-CM

## 2022-08-26 RX ORDER — CLONAZEPAM 0.5 MG/1
TABLET ORAL
Qty: 20 TABLET | OUTPATIENT
Start: 2022-08-26

## 2022-09-02 ENCOUNTER — PRE VISIT (OUTPATIENT)
Dept: ORTHOPEDICS | Facility: CLINIC | Age: 32
End: 2022-09-02

## 2022-09-02 ENCOUNTER — MYC REFILL (OUTPATIENT)
Dept: FAMILY MEDICINE | Facility: CLINIC | Age: 32
End: 2022-09-02

## 2022-09-02 ENCOUNTER — OFFICE VISIT (OUTPATIENT)
Dept: ORTHOPEDICS | Facility: CLINIC | Age: 32
End: 2022-09-02
Payer: COMMERCIAL

## 2022-09-02 DIAGNOSIS — M79.641 PAIN OF RIGHT HAND: ICD-10-CM

## 2022-09-02 DIAGNOSIS — S62.021K: ICD-10-CM

## 2022-09-02 DIAGNOSIS — F17.200 NICOTINE DEPENDENCE WITH CURRENT USE: Primary | ICD-10-CM

## 2022-09-02 PROCEDURE — 99204 OFFICE O/P NEW MOD 45 MIN: CPT | Performed by: STUDENT IN AN ORGANIZED HEALTH CARE EDUCATION/TRAINING PROGRAM

## 2022-09-02 RX ORDER — NICOTINE 21 MG/24HR
1 PATCH, TRANSDERMAL 24 HOURS TRANSDERMAL EVERY 24 HOURS
Qty: 21 PATCH | Refills: 1 | Status: SHIPPED | OUTPATIENT
Start: 2022-09-02 | End: 2024-04-01

## 2022-09-02 NOTE — LETTER
9/2/2022         RE: Tejas Villalba III  [address] [address] [address]        Dear Colleague,    Thank you for referring your patient, Tejas Villalba III, to the [facility]. Please see a copy of my visit note below.    Ortho Hand    31M ambi S p/w R wrist injury. He fell off a hammock 3.5-4 years ago and braced his fall with a R extended wrist. She then got frustrated and punched the ground with the R fist. He ignored the pain and did not seek treatment at that time. He presents today with     ROS: Negative, see HPI  Past medical history: HTN, depression, anxiety, ADHD  Past surgical history: None to the hands and wrists  Medications: Adderrall, Inderal, Zoloft, Trazodone  Allergies: Amoxicillin, Septra, Erythromycin, Ceclor, Cefixine  SH: Smokes 0.5-1 PPD, can stop smoking but needs help  FH: No bleeding or clotting issues, or problems with anesthesia    Examination:  Nonlabored breathing  Not distressed  R snuffbox tenderness and some swelling  R scaphoid tubercle tenderness  R 1st extensor compartment without tenderness and negative Finkelstein's test  No SL, LT, prox capitate, radial styloid, ulnar fovea, DRUJ with no subluxation, ECU tenderness    XR: R scaphoid nonunion with collapse, lunate cystic changes, no significant arthritis    A/P: 31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 Since he does not appear to have significant arthritis, he may potentially be a candidate for nonunion repair with bone grafting. However, we have to evaluate the structural integrity of the scaphoid and blood flow. Also, patient understands that we would not attempt to repair a nonunion if he continues to smoke.   -CT of the R wrist without contrast  -MR of the R wrist with IV contrast  -Prescribed nicotine patches. Patient needs to be off all nicotine  patches prior to any elective surgery, should that be recommended  -Return to clinic once imaging is done  -A total of 45 minutes was devoted to review of chart, direct face-to-face patient counseling and documentation during this encounter, exclusive of any procedure performed.    Lawrence Lerner MD, PhD        Again, thank you for allowing me to participate in the care of your patient.        Sincerely,        Lawrence Lerner MD

## 2022-09-02 NOTE — PATIENT INSTRUCTIONS
Transdermal, Nicoderm CQ(R):   Recommended dosing plan if currently smoking more than 10 cigarettes/day:   - Apply one 21-mg patch transdermally daily for weeks 1 through 6  - Then one 14-mg patch daily for weeks 7 and 8  - Then one 7-mg patch daily for weeks 9 and 10

## 2022-09-03 NOTE — PROGRESS NOTES
Ortho Hand    31M ambi S p/w R wrist injury. He fell off a hammock 3.5-4 years ago and braced his fall with a R extended wrist. She then got frustrated and punched the ground with the R fist. He ignored the pain and did not seek treatment at that time. He presents today with     ROS: Negative, see HPI  Past medical history: HTN, depression, anxiety, ADHD  Past surgical history: None to the hands and wrists  Medications: Adderrall, Inderal, Zoloft, Trazodone  Allergies: Amoxicillin, Septra, Erythromycin, Ceclor, Cefixine  SH: Smokes 0.5-1 PPD, can stop smoking but needs help  FH: No bleeding or clotting issues, or problems with anesthesia    Examination:  Nonlabored breathing  Not distressed  R snuffbox tenderness and some swelling  R scaphoid tubercle tenderness  R 1st extensor compartment without tenderness and negative Finkelstein's test  No SL, LT, prox capitate, radial styloid, ulnar fovea, DRUJ with no subluxation, ECU tenderness    XR: R scaphoid nonunion with collapse, lunate cystic changes, no significant arthritis    A/P: 31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 Since he does not appear to have significant arthritis, he may potentially be a candidate for nonunion repair with bone grafting. However, we have to evaluate the structural integrity of the scaphoid and blood flow. Also, patient understands that we would not attempt to repair a nonunion if he continues to smoke.   -CT of the R wrist without contrast  -MR of the R wrist with IV contrast  -Prescribed nicotine patches. Patient needs to be off all nicotine patches prior to any elective surgery, should that be recommended  -Return to clinic once imaging is done  -A total of 45 minutes was devoted to review of chart, direct face-to-face patient counseling and documentation during this encounter, exclusive of any procedure  performed.    Lawrence Lerner MD, PhD

## 2022-09-04 RX ORDER — OXYCODONE HYDROCHLORIDE 5 MG/1
TABLET ORAL
Qty: 14 TABLET | Refills: 0 | Status: SHIPPED | OUTPATIENT
Start: 2022-09-06 | End: 2022-09-20

## 2022-09-06 ENCOUNTER — MYC REFILL (OUTPATIENT)
Dept: FAMILY MEDICINE | Facility: CLINIC | Age: 32
End: 2022-09-06

## 2022-09-06 DIAGNOSIS — F41.9 ANXIETY: ICD-10-CM

## 2022-09-06 DIAGNOSIS — F90.8 ATTENTION DEFICIT HYPERACTIVITY DISORDER (ADHD), OTHER TYPE: ICD-10-CM

## 2022-09-06 DIAGNOSIS — R00.2 PALPITATIONS: ICD-10-CM

## 2022-09-06 DIAGNOSIS — G44.219 EPISODIC TENSION-TYPE HEADACHE, NOT INTRACTABLE: ICD-10-CM

## 2022-09-06 RX ORDER — BUTALBITAL, ACETAMINOPHEN AND CAFFEINE 50; 325; 40 MG/1; MG/1; MG/1
TABLET ORAL
Qty: 50 TABLET | Refills: 0 | Status: SHIPPED | OUTPATIENT
Start: 2022-09-06 | End: 2022-10-18

## 2022-09-06 RX ORDER — PROPRANOLOL HYDROCHLORIDE 20 MG/1
TABLET ORAL
Qty: 90 TABLET | Refills: 1 | Status: SHIPPED | OUTPATIENT
Start: 2022-09-06 | End: 2022-10-20

## 2022-09-06 RX ORDER — DEXTROAMPHETAMINE SACCHARATE, AMPHETAMINE ASPARTATE, DEXTROAMPHETAMINE SULFATE AND AMPHETAMINE SULFATE 7.5; 7.5; 7.5; 7.5 MG/1; MG/1; MG/1; MG/1
30 TABLET ORAL 2 TIMES DAILY
Qty: 60 TABLET | Refills: 0 | Status: SHIPPED | OUTPATIENT
Start: 2022-09-09 | End: 2022-09-09

## 2022-09-08 ENCOUNTER — MYC MEDICAL ADVICE (OUTPATIENT)
Dept: FAMILY MEDICINE | Facility: CLINIC | Age: 32
End: 2022-09-08

## 2022-09-08 DIAGNOSIS — F90.8 ATTENTION DEFICIT HYPERACTIVITY DISORDER (ADHD), OTHER TYPE: ICD-10-CM

## 2022-09-09 ENCOUNTER — TELEPHONE (OUTPATIENT)
Dept: FAMILY MEDICINE | Facility: CLINIC | Age: 32
End: 2022-09-09

## 2022-09-09 DIAGNOSIS — F90.8 ATTENTION DEFICIT HYPERACTIVITY DISORDER (ADHD), OTHER TYPE: ICD-10-CM

## 2022-09-09 RX ORDER — DEXTROAMPHETAMINE SACCHARATE, AMPHETAMINE ASPARTATE, DEXTROAMPHETAMINE SULFATE AND AMPHETAMINE SULFATE 7.5; 7.5; 7.5; 7.5 MG/1; MG/1; MG/1; MG/1
30 TABLET ORAL 2 TIMES DAILY
Qty: 60 TABLET | Refills: 0 | Status: SHIPPED | OUTPATIENT
Start: 2022-09-10 | End: 2022-09-09

## 2022-09-09 RX ORDER — DEXTROAMPHETAMINE SACCHARATE, AMPHETAMINE ASPARTATE, DEXTROAMPHETAMINE SULFATE AND AMPHETAMINE SULFATE 7.5; 7.5; 7.5; 7.5 MG/1; MG/1; MG/1; MG/1
30 TABLET ORAL 2 TIMES DAILY
Qty: 60 TABLET | Refills: 0 | Status: SHIPPED | OUTPATIENT
Start: 2022-09-10 | End: 2022-10-18

## 2022-09-09 NOTE — TELEPHONE ENCOUNTER
PCP sent adderall refill to [facility] pharmacy but according to patient below, it is on back order.  Patient found that the pended pharmacy has 80 tablets on hand and would like it resent there.     Tangela MAYON, RN

## 2022-09-09 NOTE — TELEPHONE ENCOUNTER
:     Dr. Waters has signed and given you a copy of Adderall in your basket. Can you please deliver this to the  for patient  today.     Daniella Fong RN

## 2022-09-20 ENCOUNTER — MYC REFILL (OUTPATIENT)
Dept: FAMILY MEDICINE | Facility: CLINIC | Age: 32
End: 2022-09-20

## 2022-09-20 DIAGNOSIS — M79.641 PAIN OF RIGHT HAND: ICD-10-CM

## 2022-09-20 RX ORDER — OXYCODONE HYDROCHLORIDE 5 MG/1
TABLET ORAL
Qty: 14 TABLET | Refills: 0 | Status: SHIPPED | OUTPATIENT
Start: 2022-09-21 | End: 2022-10-04

## 2022-10-04 ENCOUNTER — MYC REFILL (OUTPATIENT)
Dept: FAMILY MEDICINE | Facility: CLINIC | Age: 32
End: 2022-10-04

## 2022-10-04 DIAGNOSIS — M79.641 PAIN OF RIGHT HAND: ICD-10-CM

## 2022-10-04 RX ORDER — OXYCODONE HYDROCHLORIDE 5 MG/1
TABLET ORAL
Qty: 20 TABLET | Refills: 0 | Status: SHIPPED | OUTPATIENT
Start: 2022-10-05 | End: 2022-10-24

## 2022-10-12 ENCOUNTER — OFFICE VISIT (OUTPATIENT)
Dept: URGENT CARE | Facility: URGENT CARE | Age: 32
End: 2022-10-12
Payer: COMMERCIAL

## 2022-10-12 VITALS
WEIGHT: 237 LBS | RESPIRATION RATE: 20 BRPM | SYSTOLIC BLOOD PRESSURE: 145 MMHG | HEART RATE: 82 BPM | BODY MASS INDEX: 30.43 KG/M2 | TEMPERATURE: 98.1 F | OXYGEN SATURATION: 99 % | DIASTOLIC BLOOD PRESSURE: 92 MMHG

## 2022-10-12 DIAGNOSIS — R03.0 ELEVATED BP WITHOUT DIAGNOSIS OF HYPERTENSION: ICD-10-CM

## 2022-10-12 DIAGNOSIS — K21.9 GASTROESOPHAGEAL REFLUX DISEASE, UNSPECIFIED WHETHER ESOPHAGITIS PRESENT: ICD-10-CM

## 2022-10-12 DIAGNOSIS — J02.9 SORE THROAT: Primary | ICD-10-CM

## 2022-10-12 LAB — DEPRECATED S PYO AG THROAT QL EIA: NEGATIVE

## 2022-10-12 PROCEDURE — 99214 OFFICE O/P EST MOD 30 MIN: CPT | Performed by: INTERNAL MEDICINE

## 2022-10-12 PROCEDURE — 87651 STREP A DNA AMP PROBE: CPT | Performed by: INTERNAL MEDICINE

## 2022-10-12 RX ORDER — OMEPRAZOLE 40 MG/1
40 CAPSULE, DELAYED RELEASE ORAL DAILY
Qty: 30 CAPSULE | Refills: 0 | Status: SHIPPED | OUTPATIENT
Start: 2022-10-12

## 2022-10-12 NOTE — PROGRESS NOTES
SUBJECTIVE:  Tejas Villalba III is an 32 year old male who presents for some soreness in front of neck and trouble swallowing.  When drinks it hurts down in throat.  No fevers.  Mild runny nose. Occasional cough.  Maybe around someone with a sore throat.  Radiates down into chest some.  Worse when swallows.  No vomiting.  Stools a little loose.  Some indigestion over the past week.  Sometimes gets heartburn.  Has omeprazole but hasn't taken it.   Feels like the front of this throat feels irritated.  He is worried about strep as he thinks he might have been exposed.  No fevers.   He did quit smoking a month ago and reports his heartburn has been a little better since then, but still gets it.    PMH:   has a past medical history of ADHD and HTN.  Patient Active Problem List   Diagnosis     CARDIOVASCULAR SCREENING; LDL GOAL LESS THAN 160     BMI 30.0-30.9,adult     ADD (attention deficit disorder)     Generalized anxiety disorder     Intermittent explosive disorder     Mid back pain     Intractable chronic migraine without aura and without status migrainosus     Major depressive disorder, recurrent episode, mild (H)     Chronic bilateral low back pain with right-sided sciatica     Attention deficit hyperactivity disorder (ADHD), other type     Scalp cyst     Primary hypertension     Social History     Socioeconomic History     Marital status: Single   Tobacco Use     Smoking status: Every Day     Packs/day: 0.50     Years: 10.00     Pack years: 5.00     Types: Cigarettes     Last attempt to quit: 2010     Years since quittin.1     Smokeless tobacco: Never   Substance and Sexual Activity     Alcohol use: Yes     Comment: OCC     Drug use: No     Sexual activity: Yes     Partners: Female     Family History   Problem Relation Age of Onset     Diabetes Father        ALLERGIES:  Amoxicillin, Ceclor [cefaclor], Codeine, Erythromycin, Septra [bactrim], and Suprax [cefixime]    Current Outpatient Medications    Medication     amphetamine-dextroamphetamine (ADDERALL) 30 MG tablet     butalbital-acetaminophen-caffeine (ESGIC) -40 MG tablet     calcium carbonate 1250 (500 Ca) MG CHEW     cyclobenzaprine (FLEXERIL) 10 MG tablet     gabapentin (NEURONTIN) 300 MG capsule     hydrOXYzine (VISTARIL) 25 MG capsule     IBUPROFEN PO     methocarbamol (ROBAXIN) 500 MG tablet     omeprazole (PRILOSEC) 20 MG DR capsule     omeprazole (PRILOSEC) 40 MG DR capsule     oxyCODONE (ROXICODONE) 5 MG tablet     polyethylene glycol (MIRALAX) 17 GM/Dose powder     propranolol (INDERAL) 20 MG tablet     traZODone (DESYREL) 50 MG tablet     clonazePAM (KLONOPIN) 0.5 MG ODT     clonazePAM (KLONOPIN) 0.5 MG tablet     diclofenac (VOLTAREN) 50 MG EC tablet     nicotine (NICODERM CQ) 21 MG/24HR 24 hr patch     nicotine (NICORETTE) 2 MG gum     sertraline (ZOLOFT) 100 MG tablet     No current facility-administered medications for this visit.         ROS:  ROS is done and is negative for general/constitutional, eye, ENT, Respiratory, cardiovascular, GI, , Skin, musculoskeletal except as noted elsewhere.  All other review of systems negative except as noted elsewhere.      OBJECTIVE:  BP (!) 145/92   Pulse 82   Temp 98.1  F (36.7  C) (Tympanic)   Resp 20   Wt 107.5 kg (237 lb)   SpO2 99%   BMI 30.43 kg/m    GENERAL APPEARANCE: Alert, in no acute distress  EYES: normal  EARS: External ears normal. Canals clear. TM's normal.  NOSE:normal  OROPHARYNX:normal  NECK:No adenopathy,masses or thyromegaly  RESP: normal and clear to auscultation  CV:regular rate and rhythm and no murmurs, clicks, or gallops  ABDOMEN: Abdomen soft. BS normal. Mild epigastric tenderness, no rebound.  No masses, organomegaly  SKIN: no ulcers, lesions or rash  MUSCULOSKELETAL:Musculoskeletal normal      RESULTS  Results for orders placed or performed in visit on 10/12/22   Streptococcus A Rapid Screen w/Reflex to PCR - Clinic Collect     Status: Normal    Specimen:  Throat; Swab   Result Value Ref Range    Group A Strep antigen Negative Negative   .  No results found for this or any previous visit (from the past 48 hour(s)).    ASSESSMENT/PLAN:    ASSESSMENT / PLAN:  (J02.9) Sore throat  (primary encounter diagnosis)  Comment: neg rapid strep.  Currently his sxs are c/w gerd as cause  Plan: Streptococcus A Rapid Screen w/Reflex to PCR -         Clinic Collect, Group A Streptococcus PCR         Throat Swab        Await strep PCR and treat if positive.    (K21.9) Gastroesophageal reflux disease, unspecified whether esophagitis present  Comment: sxs c/w gerd which sounds like he has had for a while but worse recently with some throat sxs.    Plan: omeprazole (PRILOSEC) 40 MG DR capsule        Start omeprazole.  May use prn tums for sx relief this week.  F/u with pcp in 1 month to determine if longer course of tx needed or not.  Reviewed diet as well.  Pt has quit smoking which should help sxs as well. Reviewed medication instructions and side effects. Follow up if experiences side effects.. I reviewed supportive care, otc meds to use if needed, expected course, and signs of concern.  Follow up with pcp in 1 month(s) or sooner if worsens in any way.  Reviewed red flag symptoms and is to go to the ER if experiences any of these.    (R03.0) Elevated BP without diagnosis of hypertension  Comment: may be due to discomfort  Plan: Recheck BP in 3-4 weeks with a nurse visit, [facility] pharmacy visit, or a visit to primary care doctor.      See [facility] for orders, medications, letters, patient instructions    Marita Viera M.D.

## 2022-10-13 LAB — GROUP A STREP BY PCR: NOT DETECTED

## 2022-10-18 ENCOUNTER — MYC REFILL (OUTPATIENT)
Dept: FAMILY MEDICINE | Facility: CLINIC | Age: 32
End: 2022-10-18

## 2022-10-18 DIAGNOSIS — G44.219 EPISODIC TENSION-TYPE HEADACHE, NOT INTRACTABLE: ICD-10-CM

## 2022-10-18 DIAGNOSIS — F90.8 ATTENTION DEFICIT HYPERACTIVITY DISORDER (ADHD), OTHER TYPE: ICD-10-CM

## 2022-10-18 RX ORDER — DEXTROAMPHETAMINE SACCHARATE, AMPHETAMINE ASPARTATE, DEXTROAMPHETAMINE SULFATE AND AMPHETAMINE SULFATE 7.5; 7.5; 7.5; 7.5 MG/1; MG/1; MG/1; MG/1
30 TABLET ORAL 2 TIMES DAILY
Qty: 60 TABLET | Refills: 0 | Status: SHIPPED | OUTPATIENT
Start: 2022-10-20 | End: 2022-10-20

## 2022-10-18 RX ORDER — BUTALBITAL, ACETAMINOPHEN AND CAFFEINE 50; 325; 40 MG/1; MG/1; MG/1
TABLET ORAL
Qty: 50 TABLET | Refills: 0 | Status: SHIPPED | OUTPATIENT
Start: 2022-10-20 | End: 2022-11-23

## 2022-10-19 DIAGNOSIS — R00.2 PALPITATIONS: ICD-10-CM

## 2022-10-19 DIAGNOSIS — F41.9 ANXIETY: ICD-10-CM

## 2022-10-20 ENCOUNTER — MYC MEDICAL ADVICE (OUTPATIENT)
Dept: FAMILY MEDICINE | Facility: CLINIC | Age: 32
End: 2022-10-20

## 2022-10-20 DIAGNOSIS — F90.8 ATTENTION DEFICIT HYPERACTIVITY DISORDER (ADHD), OTHER TYPE: ICD-10-CM

## 2022-10-20 RX ORDER — DEXTROAMPHETAMINE SACCHARATE, AMPHETAMINE ASPARTATE, DEXTROAMPHETAMINE SULFATE AND AMPHETAMINE SULFATE 7.5; 7.5; 7.5; 7.5 MG/1; MG/1; MG/1; MG/1
30 TABLET ORAL 2 TIMES DAILY
Qty: 60 TABLET | Refills: 0 | Status: SHIPPED | OUTPATIENT
Start: 2022-10-20 | End: 2022-11-18

## 2022-10-20 RX ORDER — PROPRANOLOL HYDROCHLORIDE 20 MG/1
TABLET ORAL
Qty: 90 TABLET | Refills: 1 | Status: SHIPPED | OUTPATIENT
Start: 2022-10-20 | End: 2023-01-30

## 2022-10-20 NOTE — TELEPHONE ENCOUNTER
Called [facility] and cancelled prescription   To provider resend to [facility] pharmacy      Maddi MAYON, RN

## 2022-10-24 ENCOUNTER — MYC REFILL (OUTPATIENT)
Dept: FAMILY MEDICINE | Facility: CLINIC | Age: 32
End: 2022-10-24

## 2022-10-24 DIAGNOSIS — M79.641 PAIN OF RIGHT HAND: ICD-10-CM

## 2022-10-24 RX ORDER — OXYCODONE HYDROCHLORIDE 5 MG/1
TABLET ORAL
Qty: 30 TABLET | Refills: 0 | Status: SHIPPED | OUTPATIENT
Start: 2022-10-25 | End: 2022-11-22

## 2022-11-09 ENCOUNTER — MYC MEDICAL ADVICE (OUTPATIENT)
Dept: FAMILY MEDICINE | Facility: CLINIC | Age: 32
End: 2022-11-09

## 2022-11-09 NOTE — TELEPHONE ENCOUNTER
Called pt to schedule. Next open was 12/13. Advised pt he can do a e-visit as well. Pt will do evisit.  Diana Vaughn,

## 2022-11-14 ENCOUNTER — MYC MEDICAL ADVICE (OUTPATIENT)
Dept: FAMILY MEDICINE | Facility: CLINIC | Age: 32
End: 2022-11-14

## 2022-11-18 ENCOUNTER — MYC REFILL (OUTPATIENT)
Dept: FAMILY MEDICINE | Facility: CLINIC | Age: 32
End: 2022-11-18

## 2022-11-18 DIAGNOSIS — F90.8 ATTENTION DEFICIT HYPERACTIVITY DISORDER (ADHD), OTHER TYPE: ICD-10-CM

## 2022-11-18 DIAGNOSIS — M79.641 PAIN OF RIGHT HAND: ICD-10-CM

## 2022-11-18 RX ORDER — OXYCODONE HYDROCHLORIDE 5 MG/1
TABLET ORAL
Qty: 30 TABLET | Refills: 0 | OUTPATIENT
Start: 2022-11-18

## 2022-11-18 RX ORDER — DEXTROAMPHETAMINE SACCHARATE, AMPHETAMINE ASPARTATE, DEXTROAMPHETAMINE SULFATE AND AMPHETAMINE SULFATE 7.5; 7.5; 7.5; 7.5 MG/1; MG/1; MG/1; MG/1
30 TABLET ORAL 2 TIMES DAILY
Qty: 60 TABLET | Refills: 0 | Status: SHIPPED | OUTPATIENT
Start: 2022-11-19 | End: 2022-12-16

## 2022-11-21 ENCOUNTER — ANCILLARY PROCEDURE (OUTPATIENT)
Dept: CT IMAGING | Facility: CLINIC | Age: 32
End: 2022-11-21
Attending: STUDENT IN AN ORGANIZED HEALTH CARE EDUCATION/TRAINING PROGRAM
Payer: COMMERCIAL

## 2022-11-21 ENCOUNTER — ANCILLARY PROCEDURE (OUTPATIENT)
Dept: MRI IMAGING | Facility: CLINIC | Age: 32
End: 2022-11-21
Attending: STUDENT IN AN ORGANIZED HEALTH CARE EDUCATION/TRAINING PROGRAM
Payer: COMMERCIAL

## 2022-11-21 DIAGNOSIS — S62.021K: ICD-10-CM

## 2022-11-21 PROCEDURE — 73200 CT UPPER EXTREMITY W/O DYE: CPT | Mod: RT | Performed by: RADIOLOGY

## 2022-11-21 PROCEDURE — A9585 GADOBUTROL INJECTION: HCPCS | Performed by: RADIOLOGY

## 2022-11-21 PROCEDURE — 73222 MRI JOINT UPR EXTREM W/DYE: CPT | Mod: RT | Performed by: RADIOLOGY

## 2022-11-21 RX ORDER — GADOBUTROL 604.72 MG/ML
10 INJECTION INTRAVENOUS ONCE
Status: COMPLETED | OUTPATIENT
Start: 2022-11-21 | End: 2022-11-21

## 2022-11-21 RX ADMIN — GADOBUTROL 10 ML: 604.72 INJECTION INTRAVENOUS at 15:27

## 2022-11-22 ENCOUNTER — MYC REFILL (OUTPATIENT)
Dept: FAMILY MEDICINE | Facility: CLINIC | Age: 32
End: 2022-11-22

## 2022-11-22 DIAGNOSIS — M79.641 PAIN OF RIGHT HAND: ICD-10-CM

## 2022-11-22 RX ORDER — OXYCODONE HYDROCHLORIDE 5 MG/1
TABLET ORAL
Qty: 30 TABLET | Refills: 0 | Status: SHIPPED | OUTPATIENT
Start: 2022-11-25 | End: 2022-12-22

## 2022-11-23 DIAGNOSIS — G44.219 EPISODIC TENSION-TYPE HEADACHE, NOT INTRACTABLE: ICD-10-CM

## 2022-11-23 RX ORDER — BUTALBITAL, ACETAMINOPHEN AND CAFFEINE 50; 325; 40 MG/1; MG/1; MG/1
TABLET ORAL
Qty: 40 TABLET | Refills: 0 | Status: SHIPPED | OUTPATIENT
Start: 2022-11-23 | End: 2023-01-23

## 2022-12-16 ENCOUNTER — MYC REFILL (OUTPATIENT)
Dept: FAMILY MEDICINE | Facility: CLINIC | Age: 32
End: 2022-12-16

## 2022-12-16 DIAGNOSIS — F90.8 ATTENTION DEFICIT HYPERACTIVITY DISORDER (ADHD), OTHER TYPE: ICD-10-CM

## 2022-12-17 RX ORDER — DEXTROAMPHETAMINE SACCHARATE, AMPHETAMINE ASPARTATE, DEXTROAMPHETAMINE SULFATE AND AMPHETAMINE SULFATE 7.5; 7.5; 7.5; 7.5 MG/1; MG/1; MG/1; MG/1
30 TABLET ORAL 2 TIMES DAILY
Qty: 60 TABLET | Refills: 0 | Status: SHIPPED | OUTPATIENT
Start: 2022-12-18 | End: 2023-01-17

## 2022-12-20 ENCOUNTER — MYC MEDICAL ADVICE (OUTPATIENT)
Dept: FAMILY MEDICINE | Facility: CLINIC | Age: 32
End: 2022-12-20

## 2022-12-20 ENCOUNTER — ANCILLARY PROCEDURE (OUTPATIENT)
Dept: GENERAL RADIOLOGY | Facility: CLINIC | Age: 32
End: 2022-12-20
Attending: NURSE PRACTITIONER
Payer: COMMERCIAL

## 2022-12-20 ENCOUNTER — OFFICE VISIT (OUTPATIENT)
Dept: URGENT CARE | Facility: URGENT CARE | Age: 32
End: 2022-12-20
Payer: OTHER MISCELLANEOUS

## 2022-12-20 VITALS
HEART RATE: 88 BPM | OXYGEN SATURATION: 98 % | SYSTOLIC BLOOD PRESSURE: 152 MMHG | RESPIRATION RATE: 18 BRPM | BODY MASS INDEX: 33.51 KG/M2 | WEIGHT: 261 LBS | DIASTOLIC BLOOD PRESSURE: 100 MMHG | TEMPERATURE: 98.4 F

## 2022-12-20 DIAGNOSIS — G44.219 EPISODIC TENSION-TYPE HEADACHE, NOT INTRACTABLE: ICD-10-CM

## 2022-12-20 DIAGNOSIS — S99.922A INJURY OF LEFT FOOT, INITIAL ENCOUNTER: ICD-10-CM
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
## 2022-12-20 DIAGNOSIS — M79.641 PAIN OF RIGHT HAND: ICD-10-CM

## 2022-12-20 DIAGNOSIS — S93.602A FOOT SPRAIN, LEFT, INITIAL ENCOUNTER: Primary | ICD-10-CM

## 2022-12-20 PROCEDURE — 73630 X-RAY EXAM OF FOOT: CPT | Mod: TC | Performed by: RADIOLOGY

## 2022-12-20 PROCEDURE — 99214 OFFICE O/P EST MOD 30 MIN: CPT | Performed by: NURSE PRACTITIONER

## 2022-12-20 ASSESSMENT — PAIN SCALES - GENERAL: PAINLEVEL: MODERATE PAIN (4)

## 2022-12-20 NOTE — TELEPHONE ENCOUNTER
Provider:  Please see Picosunhart message from the patient.  Thank you. Tayla Talley R.N.    This refill/encounter is being handled by a team outside your facility.  If action needs to be taken, please route the encounter back to your team that would normally handle it. Please do not send directly back to the sender. Thank you. Tayla Talley R.N.

## 2022-12-20 NOTE — PROGRESS NOTES
Assessment & Plan     Foot sprain, left, initial encounter  - Orthopedic  Referral  - Crutches Order for DME - ONLY FOR DME  - POST OP SHOE DELUXE MALE    Injury of left foot, initial encounter  - XR Foot Left G/E 3 Views    Work related injury       Reviewed xray images and results during visit and discussed likely foot sprain. Recommend RICE: rest, ice, compress, elevate, weight bearing as tolerated. He is fitted with post-op shoe for support and crutches as needed. Ibuprofen 600 mg every 8 hours with food and 1000 mg tylenol every 8 hours as needed instead of Esgic. Recommend discussing oxycodone with prescribing provider. Letter given for work, further restrictions to come from PCP or orthopedics, referral given for orthopedics.    Follow-up with PCP if symptoms persist for 7 days, and sooner if symptoms worsen or new symptoms develop.     Discussed red flag symptoms which warrant immediate visit in emergency room    All questions were answered and patient verbalized understanding. AVS reviewed with patient.     35 minutes spent during visit, chart review, and charting on day of encounter.    Valorie Beasley, DNP, APRN, CNP 12/20/2022 3:17 PM  [facility] [place]          Je Romeo is a 32 year old male who presents to clinic today for the following health issues:  Chief Complaint   Patient presents with     Work Comp     Left great toe     MS Injury/Pain    Onset of symptoms was today  Location: left great toe  Context: The injury happened while at work      Mechanism: slipped hyperextending left great toe  Course of symptoms is worsening.    Severity mild at rest, severe with walking  Current and Associated symptoms: Pain, Swelling, Bruising and Decreased range of motion  Denies  Warmth and Redness  Aggravating Factors: walking, weight-bearing and movement  Therapies to improve symptoms include: Esgic with tylenol and 400 mg motrin helped a little today  This is the first  time this type of problem has occurred for this patient.   He has been taking oxycodone for wrist pain and is due for a refill in a few days which will land on the holiday and wonders if he can have a refill of this?     Employer: [employer]  Job Title: /sales  Date of injury: 12/20/22  Time of injury: 530am    Problem list, Medication list, Allergies, and Medical history reviewed in EPIC.    ROS:  Review of systems negative except for noted above        Objective    BP (!) 152/100   Pulse 88   Temp 98.4  F (36.9  C) (Tympanic)   Resp 18   Wt 118.4 kg (261 lb)   SpO2 98%   BMI 33.51 kg/m    Physical Exam  Constitutional:       General: He is not in acute distress.     Appearance: He is not toxic-appearing or diaphoretic.   Cardiovascular:      Pulses: Normal pulses.   Musculoskeletal:      Left lower leg: Normal.      Right ankle: Normal.      Left ankle: Normal.      Right foot: Normal.      Left foot: Normal range of motion. Swelling and bony tenderness present.      Comments: Moderate swelling left foot with tenderness with palpation left 1-4th metatarsals   Skin:     General: Skin is warm and dry.      Findings: Bruising present. No erythema.      Comments: Bruising left distal foot   Neurological:      Mental Status: He is alert.      Sensory: No sensory deficit.      Gait: Gait abnormal.      Comments: Walking with a limp favoring left foot        X-ray left foot was performed and reviewed independently by myself showing no obvious fracture or dislocation  Radiologist impression:   Results for orders placed or performed in visit on 12/20/22   XR Foot Left G/E 3 Views     Status: None (Preliminary result)    Narrative    EXAM: FOOT LEFT THREE OR MORE VIEWS  DATE/TIME: 12/20/2022 1:58 PM     INDICATION: Left foot pain after a hyperextension injury.  COMPARISON: None.      Impression    IMPRESSION: Normal joint spacing and alignment.  No fracture.

## 2022-12-20 NOTE — TELEPHONE ENCOUNTER
Routed to provider to review and advise regarding early oxycodone order request to treat toe/foot pain. See 12/20/22 urgent care encounter.    GAEL LutzN, RN

## 2022-12-20 NOTE — LETTER
December 20, 2022      Tejas Villalba III  [id number] [address] [address]        To Whom It May Concern:    Tejas Villalba III  was seen on 12/20/22 for foot sprain.  Please allow him to use crutches as needed and no use of right foot for the next week unless symptoms resolve until further evaluated by primary or orthopedics.       Sincerely,      BRIDGER Eugene

## 2022-12-22 ENCOUNTER — TELEPHONE (OUTPATIENT)
Dept: FAMILY MEDICINE | Facility: CLINIC | Age: 32
End: 2022-12-22

## 2022-12-22 RX ORDER — OXYCODONE HYDROCHLORIDE 5 MG/1
TABLET ORAL
Qty: 30 TABLET | Refills: 0 | Status: SHIPPED | OUTPATIENT
Start: 2022-12-22 | End: 2023-01-23

## 2022-12-22 NOTE — TELEPHONE ENCOUNTER
Please clarify if recommending pain management referral or do you want an appointment with you in the future to discuss pain management options?     Daniella Fong RN

## 2022-12-22 NOTE — TELEPHONE ENCOUNTER
Yes. Pain management referral since on three controlled substances. Referral placed. Will continue pain meds until seen. Karan Waters MD

## 2022-12-23 NOTE — TELEPHONE ENCOUNTER
LOV: 9/2/22, plan was for a scaphoid repair with bone grafting, but Pt needed to be completely off all nicotine prior to any surgery. Also needs CT and MRI. Looks like these were both done on 11/21/22 and he reports being nicotine free since early October. At that time, I did advise that Pt would need to be seen in clinic to review imaging a specific surgery details.

## 2022-12-23 NOTE — TELEPHONE ENCOUNTER
12/23 Called and was unable to leave voicemail. Mailbox is full.  Provided phone number [phone] to schedule follow up with Dr. Lerner.     Izabela stuart Procedure   Orthopedics, Podiatry, Sports Medicine, Ent ,Eye , Audiology, Adult Endocrine & Diabetes, Nutrition & Medication Therapy Management Specialties   [facility]

## 2022-12-23 NOTE — TELEPHONE ENCOUNTER
12/23 QWiPS message sent to patient to schedule.    Izabela stuart Procedure   Orthopedics, Podiatry, Sports Medicine, Ent ,Eye , Audiology, Adult Endocrine & Diabetes, Nutrition & Medication Therapy Management Specialties   [facility]

## 2022-12-28 ENCOUNTER — OFFICE VISIT (OUTPATIENT)
Dept: PODIATRY | Facility: CLINIC | Age: 32
End: 2022-12-28
Attending: NURSE PRACTITIONER
Payer: OTHER MISCELLANEOUS

## 2022-12-28 VITALS — DIASTOLIC BLOOD PRESSURE: 80 MMHG | HEART RATE: 90 BPM | SYSTOLIC BLOOD PRESSURE: 132 MMHG

## 2022-12-28 DIAGNOSIS — S93.602A FOOT SPRAIN, LEFT, INITIAL ENCOUNTER: ICD-10-CM

## 2022-12-28 PROBLEM — K35.30 ACUTE APPENDICITIS WITH LOCALIZED PERITONITIS, WITHOUT PERFORATION, ABSCESS, OR GANGRENE: Status: ACTIVE | Noted: 2022-12-28

## 2022-12-28 PROCEDURE — 99203 OFFICE O/P NEW LOW 30 MIN: CPT | Performed by: PODIATRIST

## 2022-12-28 RX ORDER — HYDROCODONE BITARTRATE AND ACETAMINOPHEN 5; 325 MG/1; MG/1
TABLET ORAL
COMMUNITY
Start: 2022-01-28 | End: 2023-02-07

## 2022-12-28 NOTE — LETTER
December 28, 2022        Tejas Villalba III  [address] [facility] [address]          To whom it may concern:    RE: Tejas Villalba III    Patient was seen and treated today at our clinic.  Patient may return to work 12/29/22 with the following:  Light duty-Driving only   When the patient returns to work, the following restrictions apply until 3 weeks:    12/29/22-1/18/23    Please contact me for questions or concerns.      Sincerely,        Dr. Nawaf SOLORZANOPRUSLAN FAC FAS/lld    (Electronically Signed)

## 2022-12-28 NOTE — LETTER
[address] [address]  Phone: [phone]    December 28, 2022        Tejas Villalba III  [address] [facility] [address]          To whom it may concern:    RE: Tejas Villalba III    Patient was seen and treated today at our clinic.  Patient may return to work 1/3/23 with the following:  Light duty-Driving only   When the patient returns to work, the following restrictions apply until 3 weeks:    1/3/23-1/25/23      Please contact me for questions or concerns.      Sincerely,        Dr. Nawaf Zelaya D.P.M FAC FAS/lld    (Electronically Signed)

## 2022-12-28 NOTE — PROGRESS NOTES
Subjective:    Pt is seen today for injury to left big toe.  On December 20, 2022 patient was at work.  He fell and when he landed he hyperextended his big toe.  Had pain on the dorsum of the joint.  Pain aggravated by activity and relieved by rest.  He had swelling and bruising.  Denies erythema.  Denies increased deformity.  Denies numbness.  He works driving and unloading trucks.  He has been at home since injury.  He was given postop shoe.  Denies past history of injury to this area before.    ROS: See above         Allergies   Allergen Reactions     Amoxicillin      Ceclor [Cefaclor]      Codeine Itching     Erythromycin      Septra [Bactrim]      Suprax [Cefixime]        Current Outpatient Medications   Medication Sig Dispense Refill     amphetamine-dextroamphetamine (ADDERALL) 30 MG tablet Take 1 tablet (30 mg) by mouth 2 times daily Stop the adderall XR 60 tablet 0     butalbital-acetaminophen-caffeine (ESGIC) -40 MG tablet TAKE 1 TABLET BY MOUTH EVERY 12 HOURS AS NEEDED 40 tablet 0     calcium carbonate 1250 (500 Ca) MG CHEW Take 500 mg by mouth (Patient not taking: Reported on 12/20/2022)       clonazePAM (KLONOPIN) 0.5 MG ODT DISSOLVE 1 TABLET(0.5 MG) ON THE TONGUE TWICE DAILY AS NEEDED FOR ANXIETY (Patient not taking: Reported on 10/12/2022) 30 tablet 0     clonazePAM (KLONOPIN) 0.5 MG tablet TAKE 1 TABLET(0.5 MG) BY MOUTH TWICE DAILY AS NEEDED FOR ANXIETY (Patient not taking: Reported on 10/12/2022) 20 tablet 0     cyclobenzaprine (FLEXERIL) 10 MG tablet TAKE 1/2 TO 1 TABLET(5 TO 10 MG) BY MOUTH EVERY NIGHT AS NEEDED FOR MUSCLE SPASMS 30 tablet 1     diclofenac (VOLTAREN) 50 MG EC tablet Take 1 tablet (50 mg) by mouth 2 times daily as needed for moderate pain 60 tablet 0     gabapentin (NEURONTIN) 300 MG capsule TAKE 1 CAPSULE(300 MG) BY MOUTH EVERY NIGHT AS NEEDED FOR SLEEP OR BACK PAIN (Patient not taking: Reported on 12/20/2022) 90 capsule 0     HYDROcodone-acetaminophen (NORCO) 5-325 MG  tablet TAKE 1 TABLET BY MOUTH EVERY 4-6 HOURS AS NEEDED       hydrOXYzine (VISTARIL) 25 MG capsule TAKE 1 TO 2 CAPSULES BY MOUTH AT BEDTIME AS NEEDED FOR SLEEP OR ANXIETY (Patient not taking: Reported on 12/20/2022) 30 capsule 1     IBUPROFEN PO Take 600 mg by mouth 2 times daily As needed       methocarbamol (ROBAXIN) 500 MG tablet TAKE 1 TO 2 TABLETS BY MOUTH DAILY DURING THE DAY AS NEEDED FOR MUSCLE SPASMS 40 tablet 1     nicotine (NICODERM CQ) 21 MG/24HR 24 hr patch Place 1 patch onto the skin every 24 hours (Patient not taking: Reported on 10/12/2022) 21 patch 1     nicotine (NICORETTE) 2 MG gum PLACE 1 PIECE OF GUM IN CHEEK AS NEEDED FOR SMOKING CESSATION (Patient not taking: Reported on 10/12/2022) 100 each 3     omeprazole (PRILOSEC) 20 MG DR capsule Take 1 capsule (20 mg) by mouth daily Take with diclofenac to protect stomach. 90 capsule 1     omeprazole (PRILOSEC) 40 MG DR capsule Take 1 capsule (40 mg) by mouth daily 30 capsule 0     oxyCODONE (ROXICODONE) 5 MG tablet 1 tab daily as needed for wrist pain. Avoid within 8 hours of klonopin or ALCOHOL ok early refill. 30 tablet 0     polyethylene glycol (MIRALAX) 17 GM/Dose powder Take 17 g (1 capful) by mouth daily (Patient not taking: Reported on 12/20/2022) 850 g 0     propranolol (INDERAL) 20 MG tablet TAKE 1 TABLET(20 MG) BY MOUTH THREE TIMES DAILY AS NEEDED FOR ANXIETY OR HIGH BLOOD PRESSURE. MAY DOUBLE DOSAGE AS NEEDED (Patient not taking: Reported on 12/20/2022) 90 tablet 1     sertraline (ZOLOFT) 100 MG tablet TAKE 1.5 TABLETS BY MOUTH FOR DEPRESSION AND ANXIETY. (Patient not taking: Reported on 10/12/2022) 135 tablet 1     traZODone (DESYREL) 50 MG tablet TAKE 1 TABLET(50 MG) BY MOUTH EVERY NIGHT AS NEEDED FOR SLEEP. DO NOT TAKE WITH FLEXERIL 30 tablet 3       Patient Active Problem List   Diagnosis     CARDIOVASCULAR SCREENING; LDL GOAL LESS THAN 160     BMI 30.0-30.9,adult     ADD (attention deficit disorder)     Generalized anxiety disorder      Intermittent explosive disorder     Mid back pain     Intractable chronic migraine without aura and without status migrainosus     Major depressive disorder, recurrent episode, mild (H)     Chronic bilateral low back pain with right-sided sciatica     Attention deficit hyperactivity disorder (ADHD), other type     Scalp cyst     Primary hypertension     Acute appendicitis with localized peritonitis, without perforation, abscess, or gangrene       Past Medical History:   Diagnosis Date     ADHD      HTN        Past Surgical History:   Procedure Laterality Date     PE TUBES         Family History   Problem Relation Age of Onset     Diabetes Father        Social History     Tobacco Use     Smoking status: Former     Packs/day: 0.50     Years: 10.00     Pack years: 5.00     Types: Cigarettes     Quit date: 2010     Years since quittin.3     Smokeless tobacco: Never   Substance Use Topics     Alcohol use: Yes     Comment: OCC         Exam:    Vitals: /80   Pulse 90   BMI: There is no height or weight on file to calculate BMI.  Height: Data Unavailable    Constitutional/ general:  Pt is in no apparent distress, appears well-nourished.  Cooperative with history and physical exam.     Psych:  The patient answered questions appropriately.  Normal affect.  Seems to have reasonable expectations, in terms of treatment.     Eyes:  Visual scanning/ tracking without deficit.     Ears:  Response to auditory stimuli is normal.  negative hearing aid devices.  Auricles in proper alignment.     Lymphatic:  Popliteal lymph nodes not enlarged.     Lungs:  Non labored breathing, non labored speech. No cough.  No audible wheezing. Even, quiet breathing.       Vascular:  positive pedal pulses bilaterally for both the DP and PT arteries.  CFT < 3 sec.  negative ankle edema.  positive pedal hair growth.    Neuro:  Alert and oriented x 3. Coordinated gait.  Light touch sensation is intact to the L4, L5, S1 distributions. No  obvious deficits.  No evidence of neurological-based weakness, spasticity, or contracture in the lower extremities.      Derm: Normal texture and turgor.  No erythema, ecchymosis, or cyanosis.      Musculoskeletal:    Lower extremity muscle strength is normal.  Patient is ambulatory without an assistive device or brace.  No gross deformities.  Normal arch.  Normal ROM all forefoot and rearfoot joints.  Left first MTPJ extensor and flexor tendons intact.  No pain on palpation of the sesamoids.  Edema on the dorsum of the first MTPJ.  Just some faint ecchymosis noted and this is almost resolved.  No erythema.  Pain on the dorsum of the left first MTPJ.  Pain with dorsiflexion.    Narrative & Impression   EXAM: FOOT LEFT THREE OR MORE VIEWS  DATE/TIME: 12/20/2022 1:58 PM      INDICATION: Left foot pain after a hyperextension injury.  COMPARISON: None.                                                                      IMPRESSION: Normal joint spacing and alignment.  No fracture.         Assessment: Left first MTPJ turf toe injury    Plan:  X-rays from past personally reviewed.  Discussed with patient that turf toe injury can be very slow to heal.  Discussed importance of protecting this so it does not get reinjured.  Discussed stiff shoes to wear at home.  Dispensed carbon plate to be worn and work boot.  Also discussed he could get new stiff work boots.  His current work boots are quite malleable.  In the future recommend a stiffer shoe to protect feet at all times.  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  Will return to clinic in 3 weeks to reevaluate.  Discussed if he needs help in strengthening in this physical therapy can be beneficial.    Nawaf Zelaya, AR, FACFAS

## 2022-12-28 NOTE — LETTER
12/28/2022         RE: Tejas Villalba III  [address] [facility] [facility] [address]        Dear Colleague,    Thank you for referring your patient, Tejas Villalba III, to the [facility]. Please see a copy of my visit note below.     Subjective:    Pt is seen today for injury to left big toe.  On December 20, 2022 patient was at work.  He fell and when he landed he hyperextended his big toe.  Had pain on the dorsum of the joint.  Pain aggravated by activity and relieved by rest.  He had swelling and bruising.  Denies erythema.  Denies increased deformity.  Denies numbness.  He works driving and unloading trucks.  He has been at home since injury.  He was given postop shoe.  Denies past history of injury to this area before.    ROS: See above         Allergies   Allergen Reactions     Amoxicillin      Ceclor [Cefaclor]      Codeine Itching     Erythromycin      Septra [Bactrim]      Suprax [Cefixime]        Current Outpatient Medications   Medication Sig Dispense Refill     amphetamine-dextroamphetamine (ADDERALL) 30 MG tablet Take 1 tablet (30 mg) by mouth 2 times daily Stop the adderall XR 60 tablet 0     butalbital-acetaminophen-caffeine (ESGIC) -40 MG tablet TAKE 1 TABLET BY MOUTH EVERY 12 HOURS AS NEEDED 40 tablet 0     calcium carbonate 1250 (500 Ca) MG CHEW Take 500 mg by mouth (Patient not taking: Reported on 12/20/2022)       clonazePAM (KLONOPIN) 0.5 MG ODT DISSOLVE 1 TABLET(0.5 MG) ON THE TONGUE TWICE DAILY AS NEEDED FOR ANXIETY (Patient not taking: Reported on 10/12/2022) 30 tablet 0     clonazePAM (KLONOPIN) 0.5 MG tablet TAKE 1 TABLET(0.5 MG) BY MOUTH TWICE DAILY AS NEEDED FOR ANXIETY (Patient not taking: Reported on 10/12/2022) 20 tablet 0     cyclobenzaprine (FLEXERIL) 10 MG tablet TAKE 1/2 TO 1 TABLET(5 TO 10 MG) BY MOUTH EVERY NIGHT AS NEEDED FOR MUSCLE SPASMS 30 tablet 1     diclofenac (VOLTAREN) 50 MG EC tablet Take 1 tablet (50 mg) by mouth 2  times daily as needed for moderate pain 60 tablet 0     gabapentin (NEURONTIN) 300 MG capsule TAKE 1 CAPSULE(300 MG) BY MOUTH EVERY NIGHT AS NEEDED FOR SLEEP OR BACK PAIN (Patient not taking: Reported on 12/20/2022) 90 capsule 0     HYDROcodone-acetaminophen (NORCO) 5-325 MG tablet TAKE 1 TABLET BY MOUTH EVERY 4-6 HOURS AS NEEDED       hydrOXYzine (VISTARIL) 25 MG capsule TAKE 1 TO 2 CAPSULES BY MOUTH AT BEDTIME AS NEEDED FOR SLEEP OR ANXIETY (Patient not taking: Reported on 12/20/2022) 30 capsule 1     IBUPROFEN PO Take 600 mg by mouth 2 times daily As needed       methocarbamol (ROBAXIN) 500 MG tablet TAKE 1 TO 2 TABLETS BY MOUTH DAILY DURING THE DAY AS NEEDED FOR MUSCLE SPASMS 40 tablet 1     nicotine (NICODERM CQ) 21 MG/24HR 24 hr patch Place 1 patch onto the skin every 24 hours (Patient not taking: Reported on 10/12/2022) 21 patch 1     nicotine (NICORETTE) 2 MG gum PLACE 1 PIECE OF GUM IN CHEEK AS NEEDED FOR SMOKING CESSATION (Patient not taking: Reported on 10/12/2022) 100 each 3     omeprazole (PRILOSEC) 20 MG DR capsule Take 1 capsule (20 mg) by mouth daily Take with diclofenac to protect stomach. 90 capsule 1     omeprazole (PRILOSEC) 40 MG DR capsule Take 1 capsule (40 mg) by mouth daily 30 capsule 0     oxyCODONE (ROXICODONE) 5 MG tablet 1 tab daily as needed for wrist pain. Avoid within 8 hours of klonopin or ALCOHOL ok early refill. 30 tablet 0     polyethylene glycol (MIRALAX) 17 GM/Dose powder Take 17 g (1 capful) by mouth daily (Patient not taking: Reported on 12/20/2022) 850 g 0     propranolol (INDERAL) 20 MG tablet TAKE 1 TABLET(20 MG) BY MOUTH THREE TIMES DAILY AS NEEDED FOR ANXIETY OR HIGH BLOOD PRESSURE. MAY DOUBLE DOSAGE AS NEEDED (Patient not taking: Reported on 12/20/2022) 90 tablet 1     sertraline (ZOLOFT) 100 MG tablet TAKE 1.5 TABLETS BY MOUTH FOR DEPRESSION AND ANXIETY. (Patient not taking: Reported on 10/12/2022) 135 tablet 1     traZODone (DESYREL) 50 MG tablet TAKE 1 TABLET(50 MG)  BY MOUTH EVERY NIGHT AS NEEDED FOR SLEEP. DO NOT TAKE WITH FLEXERIL 30 tablet 3       Patient Active Problem List   Diagnosis     CARDIOVASCULAR SCREENING; LDL GOAL LESS THAN 160     BMI 30.0-30.9,adult     ADD (attention deficit disorder)     Generalized anxiety disorder     Intermittent explosive disorder     Mid back pain     Intractable chronic migraine without aura and without status migrainosus     Major depressive disorder, recurrent episode, mild (H)     Chronic bilateral low back pain with right-sided sciatica     Attention deficit hyperactivity disorder (ADHD), other type     Scalp cyst     Primary hypertension     Acute appendicitis with localized peritonitis, without perforation, abscess, or gangrene       Past Medical History:   Diagnosis Date     ADHD      HTN        Past Surgical History:   Procedure Laterality Date     PE TUBES         Family History   Problem Relation Age of Onset     Diabetes Father        Social History     Tobacco Use     Smoking status: Former     Packs/day: 0.50     Years: 10.00     Pack years: 5.00     Types: Cigarettes     Quit date: 2010     Years since quittin.3     Smokeless tobacco: Never   Substance Use Topics     Alcohol use: Yes     Comment: OCC         Exam:    Vitals: /80   Pulse 90   BMI: There is no height or weight on file to calculate BMI.  Height: Data Unavailable    Constitutional/ general:  Pt is in no apparent distress, appears well-nourished.  Cooperative with history and physical exam.     Psych:  The patient answered questions appropriately.  Normal affect.  Seems to have reasonable expectations, in terms of treatment.     Eyes:  Visual scanning/ tracking without deficit.     Ears:  Response to auditory stimuli is normal.  negative hearing aid devices.  Auricles in proper alignment.     Lymphatic:  Popliteal lymph nodes not enlarged.     Lungs:  Non labored breathing, non labored speech. No cough.  No audible wheezing. Even, quiet  breathing.       Vascular:  positive pedal pulses bilaterally for both the DP and PT arteries.  CFT < 3 sec.  negative ankle edema.  positive pedal hair growth.    Neuro:  Alert and oriented x 3. Coordinated gait.  Light touch sensation is intact to the L4, L5, S1 distributions. No obvious deficits.  No evidence of neurological-based weakness, spasticity, or contracture in the lower extremities.      Derm: Normal texture and turgor.  No erythema, ecchymosis, or cyanosis.      Musculoskeletal:    Lower extremity muscle strength is normal.  Patient is ambulatory without an assistive device or brace.  No gross deformities.  Normal arch.  Normal ROM all forefoot and rearfoot joints.  Left first MTPJ extensor and flexor tendons intact.  No pain on palpation of the sesamoids.  Edema on the dorsum of the first MTPJ.  Just some faint ecchymosis noted and this is almost resolved.  No erythema.  Pain on the dorsum of the left first MTPJ.  Pain with dorsiflexion.    Narrative & Impression   EXAM: FOOT LEFT THREE OR MORE VIEWS  DATE/TIME: 12/20/2022 1:58 PM      INDICATION: Left foot pain after a hyperextension injury.  COMPARISON: None.                                                                      IMPRESSION: Normal joint spacing and alignment.  No fracture.         Assessment: Left first MTPJ turf toe injury    Plan:  X-rays from past personally reviewed.  Discussed with patient that turf toe injury can be very slow to heal.  Discussed importance of protecting this so it does not get reinjured.  Discussed stiff shoes to wear at home.  Dispensed carbon plate to be worn and work boot.  Also discussed he could get new stiff work boots.  His current work boots are quite malleable.  In the future recommend a stiffer shoe to protect feet at all times.  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  Will return to clinic in 3 weeks to reevaluate.  Discussed if he needs help in strengthening in this physical therapy can  be beneficial.    Nawaf Zelaya DPM, FACFAS           Again, thank you for allowing me to participate in the care of your patient.        Sincerely,        Nawaf Zelaya DPM

## 2022-12-28 NOTE — PATIENT INSTRUCTIONS
We wish you continued good healing. If you have any questions or concerns, please do not hesitate to contact us at  [phone]    [facility] (secure e-mail communication and access to your chart) to send a message or to make an appointment.    Please remember to call and schedule a follow up appointment if one was recommended at your earliest convenience.     PODIATRY CLINIC HOURS  TELEPHONE NUMBER    Dr. Nawaf SOLORZANOPRUSLAN [facility]        Clinics:  [facility]   [place]  Tuesday 1PM-6PM  [place]  Wednesday 745AM-330PM  [place]  Thursday/Friday 745AM-230PM       [facility] APPOINTMENTS  [phone]    [place] APPOINTMENTS  [phone]        If you need a medication refill, please contact us you may need lab work and/or a follow up visit prior to your refill (i.e. Antifungal medications).  If MRI needed please call Imaging at [phone]   HOW DO I GET MY KNEE SCOOTER? Knee scooters can be picked up at ANY Medical Supply stores with your knee scooter Prescription.  OR  Bring your signed prescription to an [facility] Medical Equipment showroom.

## 2023-01-11 ENCOUNTER — MYC REFILL (OUTPATIENT)
Dept: FAMILY MEDICINE | Facility: CLINIC | Age: 33
End: 2023-01-11

## 2023-01-11 DIAGNOSIS — G44.219 EPISODIC TENSION-TYPE HEADACHE, NOT INTRACTABLE: ICD-10-CM

## 2023-01-12 ENCOUNTER — MYC MEDICAL ADVICE (OUTPATIENT)
Dept: FAMILY MEDICINE | Facility: CLINIC | Age: 33
End: 2023-01-12

## 2023-01-12 RX ORDER — BUTALBITAL, ACETAMINOPHEN AND CAFFEINE 50; 325; 40 MG/1; MG/1; MG/1
1 TABLET ORAL EVERY 12 HOURS PRN
Qty: 40 TABLET | Refills: 0 | OUTPATIENT
Start: 2023-01-12

## 2023-01-13 NOTE — TELEPHONE ENCOUNTER
Please review pt message below. Pt does NOT agree to plan for pain clinic referral at this time. Requesting appointment with PCP to discuss. Please advise.    GAEL LutzN, RN

## 2023-01-13 NOTE — TELEPHONE ENCOUNTER
Please help patient set-up appointment in next month- ok video visit. Patient is on a lot of controlled substances and we are tightly monitored by the [employer]. Will continue to help in the comfortable scope of my practice until get's insurance. Karan Waters MD

## 2023-01-17 ENCOUNTER — MYC REFILL (OUTPATIENT)
Dept: FAMILY MEDICINE | Facility: CLINIC | Age: 33
End: 2023-01-17
Payer: COMMERCIAL

## 2023-01-17 ENCOUNTER — MYC MEDICAL ADVICE (OUTPATIENT)
Dept: FAMILY MEDICINE | Facility: CLINIC | Age: 33
End: 2023-01-17
Payer: COMMERCIAL

## 2023-01-17 DIAGNOSIS — F90.8 ATTENTION DEFICIT HYPERACTIVITY DISORDER (ADHD), OTHER TYPE: ICD-10-CM

## 2023-01-17 DIAGNOSIS — F90.8 ATTENTION DEFICIT HYPERACTIVITY DISORDER (ADHD), OTHER TYPE: Primary | ICD-10-CM

## 2023-01-17 RX ORDER — DEXTROAMPHETAMINE SACCHARATE, AMPHETAMINE ASPARTATE, DEXTROAMPHETAMINE SULFATE AND AMPHETAMINE SULFATE 7.5; 7.5; 7.5; 7.5 MG/1; MG/1; MG/1; MG/1
30 TABLET ORAL 2 TIMES DAILY
Qty: 60 TABLET | Refills: 0 | Status: SHIPPED | OUTPATIENT
Start: 2023-01-17 | End: 2023-08-28

## 2023-01-17 RX ORDER — DEXTROAMPHETAMINE SACCHARATE, AMPHETAMINE ASPARTATE, DEXTROAMPHETAMINE SULFATE AND AMPHETAMINE SULFATE 5; 5; 5; 5 MG/1; MG/1; MG/1; MG/1
20 TABLET ORAL 3 TIMES DAILY
Qty: 90 TABLET | Refills: 0 | Status: SHIPPED | OUTPATIENT
Start: 2023-01-17 | End: 2024-04-01

## 2023-01-17 NOTE — TELEPHONE ENCOUNTER
Provider:  Please see MyChart message from the patient.  Thank you. Tayla Talley R.N.     The patient is a 48y Male complaining of shortness of breath.

## 2023-01-18 ENCOUNTER — TELEPHONE (OUTPATIENT)
Dept: FAMILY MEDICINE | Facility: CLINIC | Age: 33
End: 2023-01-18
Payer: COMMERCIAL

## 2023-01-18 DIAGNOSIS — F90.8 ATTENTION DEFICIT HYPERACTIVITY DISORDER (ADHD), OTHER TYPE: ICD-10-CM

## 2023-01-18 RX ORDER — DEXTROAMPHETAMINE SACCHARATE, AMPHETAMINE ASPARTATE, DEXTROAMPHETAMINE SULFATE AND AMPHETAMINE SULFATE 3.75; 3.75; 3.75; 3.75 MG/1; MG/1; MG/1; MG/1
30 TABLET ORAL 2 TIMES DAILY
Qty: 120 TABLET | Refills: 0 | Status: SHIPPED | OUTPATIENT
Start: 2023-01-18 | End: 2023-02-17

## 2023-01-18 NOTE — TELEPHONE ENCOUNTER
New Medication Request        What medication are you requesting?: [facility] is calling-They did not get Adderall 20 mg in. They are wondering if you can order adderall 15 mg, 4 a day. They ran this though the insurance company, and it will be covered.    Reason for medication request: See above    Have you taken this medication before?: No    Controlled Substance Agreement on file:   CSA -- Patient Level:    CSA: None found at the patient level.         Patient offered an appointment? No    Preferred Pharmacy:    [id number] - [address] AT [address] [address]  Phone: [phone] Fax: [phone]        Could we send this information to you in [place] or would you prefer to receive a phone call?:   No preference   Okay to leave a detailed message?: No at Other phone number:  [facility]-Reta Munoz MA/RAHEEL

## 2023-01-19 ENCOUNTER — OFFICE VISIT (OUTPATIENT)
Dept: PODIATRY | Facility: CLINIC | Age: 33
End: 2023-01-19
Payer: OTHER MISCELLANEOUS

## 2023-01-19 VITALS — SYSTOLIC BLOOD PRESSURE: 140 MMHG | DIASTOLIC BLOOD PRESSURE: 86 MMHG | HEART RATE: 80 BPM

## 2023-01-19 DIAGNOSIS — S93.602A FOOT SPRAIN, LEFT, INITIAL ENCOUNTER: Primary | ICD-10-CM

## 2023-01-19 PROCEDURE — 99213 OFFICE O/P EST LOW 20 MIN: CPT | Performed by: PODIATRIST

## 2023-01-19 NOTE — PATIENT INSTRUCTIONS
We wish you continued good healing. If you have any questions or concerns, please do not hesitate to contact us at  [phone]    [facility] (secure e-mail communication and access to your chart) to send a message or to make an appointment.    Please remember to call and schedule a follow up appointment if one was recommended at your earliest convenience.     PODIATRY CLINIC HOURS  TELEPHONE NUMBER    Dr. Nawaf SOLORZANOPRUSLAN [facility]        Clinics:  [facility]   [place]  Tuesday 1PM-6PM  [place]  Wednesday 745AM-330PM  [place]  Thursday/Friday 745AM-230PM       [place] APPOINTMENTS  [phone]    [place] APPOINTMENTS  [phone]        If you need a medication refill, please contact us you may need lab work and/or a follow up visit prior to your refill (i.e. Antifungal medications).  If MRI needed please call Imaging at [phone]   HOW DO I GET MY KNEE SCOOTER? Knee scooters can be picked up at ANY Medical Supply stores with your knee scooter Prescription.  OR  Bring your signed prescription to an [facility] Medical Equipment showroom.

## 2023-01-19 NOTE — LETTER
1/19/2023         RE: Tejas Villalba III  [address] [facility] [address]        Dear Colleague,    Thank you for referring your patient, Tejas Villalba III, to the [facility]. Please see a copy of my visit note below.     Subjective:    12/28/22   Pt is seen today for injury to left big toe.  On December 20, 2022 patient was at work.  He fell and when he landed he hyperextended his big toe.  Had pain on the dorsum of the joint.  Pain aggravated by activity and relieved by rest.  He had swelling and bruising.  Denies erythema.  Denies increased deformity.  Denies numbness.  He works driving and unloading trucks.  He has been at home since injury.  He was given postop shoe.  Denies past history of injury to this area before.    1/19/23 patient is 4 weeks 2 days status post left hallux turf toe injury on December 20, 2022.  Patient states he is 30 to 40% better.  Still has pain dorsally.  Edema is decreasing.  Can move it slightly easier.  No other new changes.    ROS: See above         Allergies   Allergen Reactions     Amoxicillin      Ceclor [Cefaclor]      Codeine Itching     Erythromycin      Septra [Bactrim]      Suprax [Cefixime]        Current Outpatient Medications   Medication Sig Dispense Refill     amphetamine-dextroamphetamine (ADDERALL) 15 MG tablet Take 2 tablets (30 mg) by mouth 2 times daily Temporary change in adderall until XR available 120 tablet 0     amphetamine-dextroamphetamine (ADDERALL) 20 MG tablet Take 1 tablet (20 mg) by mouth 3 times daily As needed for inattention. temporarily replaces adderall xr 90 tablet 0     amphetamine-dextroamphetamine (ADDERALL) 30 MG tablet Take 1 tablet (30 mg) by mouth 2 times daily Stop the adderall XR 60 tablet 0     butalbital-acetaminophen-caffeine (ESGIC) -40 MG tablet TAKE 1 TABLET BY MOUTH EVERY 12 HOURS AS NEEDED 40 tablet 0     calcium carbonate 1250 (500 Ca) MG CHEW Take 500 mg by mouth (Patient  not taking: Reported on 12/20/2022)       clonazePAM (KLONOPIN) 0.5 MG ODT DISSOLVE 1 TABLET(0.5 MG) ON THE TONGUE TWICE DAILY AS NEEDED FOR ANXIETY (Patient not taking: Reported on 10/12/2022) 30 tablet 0     clonazePAM (KLONOPIN) 0.5 MG tablet TAKE 1 TABLET(0.5 MG) BY MOUTH TWICE DAILY AS NEEDED FOR ANXIETY (Patient not taking: Reported on 10/12/2022) 20 tablet 0     cyclobenzaprine (FLEXERIL) 10 MG tablet TAKE 1/2 TO 1 TABLET(5 TO 10 MG) BY MOUTH EVERY NIGHT AS NEEDED FOR MUSCLE SPASMS 30 tablet 1     diclofenac (VOLTAREN) 50 MG EC tablet Take 1 tablet (50 mg) by mouth 2 times daily as needed for moderate pain 60 tablet 0     gabapentin (NEURONTIN) 300 MG capsule TAKE 1 CAPSULE(300 MG) BY MOUTH EVERY NIGHT AS NEEDED FOR SLEEP OR BACK PAIN (Patient not taking: Reported on 12/20/2022) 90 capsule 0     HYDROcodone-acetaminophen (NORCO) 5-325 MG tablet TAKE 1 TABLET BY MOUTH EVERY 4-6 HOURS AS NEEDED       hydrOXYzine (VISTARIL) 25 MG capsule TAKE 1 TO 2 CAPSULES BY MOUTH AT BEDTIME AS NEEDED FOR SLEEP OR ANXIETY (Patient not taking: Reported on 12/20/2022) 30 capsule 1     IBUPROFEN PO Take 600 mg by mouth 2 times daily As needed       methocarbamol (ROBAXIN) 500 MG tablet TAKE 1 TO 2 TABLETS BY MOUTH DAILY DURING THE DAY AS NEEDED FOR MUSCLE SPASMS 40 tablet 1     nicotine (NICODERM CQ) 21 MG/24HR 24 hr patch Place 1 patch onto the skin every 24 hours (Patient not taking: Reported on 10/12/2022) 21 patch 1     nicotine (NICORETTE) 2 MG gum PLACE 1 PIECE OF GUM IN CHEEK AS NEEDED FOR SMOKING CESSATION (Patient not taking: Reported on 10/12/2022) 100 each 3     omeprazole (PRILOSEC) 20 MG DR capsule Take 1 capsule (20 mg) by mouth daily Take with diclofenac to protect stomach. 90 capsule 1     omeprazole (PRILOSEC) 40 MG DR capsule Take 1 capsule (40 mg) by mouth daily 30 capsule 0     oxyCODONE (ROXICODONE) 5 MG tablet 1 tab daily as needed for wrist pain. Avoid within 8 hours of klonopin or ALCOHOL ok early refill.  30 tablet 0     polyethylene glycol (MIRALAX) 17 GM/Dose powder Take 17 g (1 capful) by mouth daily (Patient not taking: Reported on 2022) 850 g 0     propranolol (INDERAL) 20 MG tablet TAKE 1 TABLET(20 MG) BY MOUTH THREE TIMES DAILY AS NEEDED FOR ANXIETY OR HIGH BLOOD PRESSURE. MAY DOUBLE DOSAGE AS NEEDED (Patient not taking: Reported on 2022) 90 tablet 1     sertraline (ZOLOFT) 100 MG tablet TAKE 1.5 TABLETS BY MOUTH FOR DEPRESSION AND ANXIETY. (Patient not taking: Reported on 10/12/2022) 135 tablet 1     traZODone (DESYREL) 50 MG tablet TAKE 1 TABLET(50 MG) BY MOUTH EVERY NIGHT AS NEEDED FOR SLEEP. DO NOT TAKE WITH FLEXERIL 30 tablet 3       Patient Active Problem List   Diagnosis     CARDIOVASCULAR SCREENING; LDL GOAL LESS THAN 160     BMI 30.0-30.9,adult     ADD (attention deficit disorder)     Generalized anxiety disorder     Intermittent explosive disorder     Mid back pain     Intractable chronic migraine without aura and without status migrainosus     Major depressive disorder, recurrent episode, mild (H)     Chronic bilateral low back pain with right-sided sciatica     Attention deficit hyperactivity disorder (ADHD), other type     Scalp cyst     Primary hypertension     Acute appendicitis with localized peritonitis, without perforation, abscess, or gangrene       Past Medical History:   Diagnosis Date     ADHD      HTN        Past Surgical History:   Procedure Laterality Date     PE TUBES         Family History   Problem Relation Age of Onset     Diabetes Father        Social History     Tobacco Use     Smoking status: Former     Packs/day: 0.50     Years: 10.00     Pack years: 5.00     Types: Cigarettes     Quit date: 2010     Years since quittin.4     Smokeless tobacco: Never   Substance Use Topics     Alcohol use: Yes     Comment: OCC         Exam:    Vitals: There were no vitals taken for this visit.  BMI: There is no height or weight on file to calculate BMI.  Height: Data  Unavailable    Constitutional/ general:  Pt is in no apparent distress, appears well-nourished.  Cooperative with history and physical exam.     Psych:  The patient answered questions appropriately.  Normal affect.  Seems to have reasonable expectations, in terms of treatment.     Eyes:  Visual scanning/ tracking without deficit.     Ears:  Response to auditory stimuli is normal.  negative hearing aid devices.  Auricles in proper alignment.     Lymphatic:  Popliteal lymph nodes not enlarged.     Lungs:  Non labored breathing, non labored speech. No cough.  No audible wheezing. Even, quiet breathing.       Vascular:  positive pedal pulses bilaterally for both the DP and PT arteries.  CFT < 3 sec.  negative ankle edema.  positive pedal hair growth.    Neuro:  Alert and oriented x 3. Coordinated gait.  Light touch sensation is intact to the L4, L5, S1 distributions. No obvious deficits.  No evidence of neurological-based weakness, spasticity, or contracture in the lower extremities.      Derm: Normal texture and turgor.  No erythema, ecchymosis, or cyanosis.      Musculoskeletal:    Lower extremity muscle strength is normal.  Patient is ambulatory without an assistive device or brace.  No gross deformities.  Normal arch.  Normal ROM all forefoot and rearfoot joints.  Left first MTPJ extensor and flexor tendons intact.  No pain on palpation of the sesamoids.  Edema on the dorsum of the first MTPJ.  No erythema or ecchymosis.  Pain on the dorsum of the left first MTPJ, but less than last visit.  Pain with dorsiflexion.    Narrative & Impression   EXAM: FOOT LEFT THREE OR MORE VIEWS  DATE/TIME: 12/20/2022 1:58 PM      INDICATION: Left foot pain after a hyperextension injury.  COMPARISON: None.                                                                      IMPRESSION: Normal joint spacing and alignment.  No fracture.         Assessment: Left first MTPJ turf toe injury    Plan:  Patient improving.  Again discussed  that this is a slow injury to heal.  We will start physical therapy.  Prescription for orthotics to help forefoot function more efficiently once he goes back to work.  Patient will be careful to protect this and not reinjure this.  Return to clinic in 3 weeks.    Nawaf Zelaya DPM, FACFAS           Again, thank you for allowing me to participate in the care of your patient.        Sincerely,        Nawaf Zelaya DPM

## 2023-01-19 NOTE — PROGRESS NOTES
Subjective:    12/28/22   Pt is seen today for injury to left big toe.  On December 20, 2022 patient was at work.  He fell and when he landed he hyperextended his big toe.  Had pain on the dorsum of the joint.  Pain aggravated by activity and relieved by rest.  He had swelling and bruising.  Denies erythema.  Denies increased deformity.  Denies numbness.  He works driving and unloading trucks.  He has been at home since injury.  He was given postop shoe.  Denies past history of injury to this area before.    1/19/23 patient is 4 weeks 2 days status post left hallux turf toe injury on December 20, 2022.  Patient states he is 30 to 40% better.  Still has pain dorsally.  Edema is decreasing.  Can move it slightly easier.  No other new changes.    ROS: See above         Allergies   Allergen Reactions     Amoxicillin      Ceclor [Cefaclor]      Codeine Itching     Erythromycin      Septra [Bactrim]      Suprax [Cefixime]        Current Outpatient Medications   Medication Sig Dispense Refill     amphetamine-dextroamphetamine (ADDERALL) 15 MG tablet Take 2 tablets (30 mg) by mouth 2 times daily Temporary change in adderall until XR available 120 tablet 0     amphetamine-dextroamphetamine (ADDERALL) 20 MG tablet Take 1 tablet (20 mg) by mouth 3 times daily As needed for inattention. temporarily replaces adderall xr 90 tablet 0     amphetamine-dextroamphetamine (ADDERALL) 30 MG tablet Take 1 tablet (30 mg) by mouth 2 times daily Stop the adderall XR 60 tablet 0     butalbital-acetaminophen-caffeine (ESGIC) -40 MG tablet TAKE 1 TABLET BY MOUTH EVERY 12 HOURS AS NEEDED 40 tablet 0     calcium carbonate 1250 (500 Ca) MG CHEW Take 500 mg by mouth (Patient not taking: Reported on 12/20/2022)       clonazePAM (KLONOPIN) 0.5 MG ODT DISSOLVE 1 TABLET(0.5 MG) ON THE TONGUE TWICE DAILY AS NEEDED FOR ANXIETY (Patient not taking: Reported on 10/12/2022) 30 tablet 0     clonazePAM (KLONOPIN) 0.5 MG tablet TAKE 1 TABLET(0.5 MG) BY  MOUTH TWICE DAILY AS NEEDED FOR ANXIETY (Patient not taking: Reported on 10/12/2022) 20 tablet 0     cyclobenzaprine (FLEXERIL) 10 MG tablet TAKE 1/2 TO 1 TABLET(5 TO 10 MG) BY MOUTH EVERY NIGHT AS NEEDED FOR MUSCLE SPASMS 30 tablet 1     diclofenac (VOLTAREN) 50 MG EC tablet Take 1 tablet (50 mg) by mouth 2 times daily as needed for moderate pain 60 tablet 0     gabapentin (NEURONTIN) 300 MG capsule TAKE 1 CAPSULE(300 MG) BY MOUTH EVERY NIGHT AS NEEDED FOR SLEEP OR BACK PAIN (Patient not taking: Reported on 12/20/2022) 90 capsule 0     HYDROcodone-acetaminophen (NORCO) 5-325 MG tablet TAKE 1 TABLET BY MOUTH EVERY 4-6 HOURS AS NEEDED       hydrOXYzine (VISTARIL) 25 MG capsule TAKE 1 TO 2 CAPSULES BY MOUTH AT BEDTIME AS NEEDED FOR SLEEP OR ANXIETY (Patient not taking: Reported on 12/20/2022) 30 capsule 1     IBUPROFEN PO Take 600 mg by mouth 2 times daily As needed       methocarbamol (ROBAXIN) 500 MG tablet TAKE 1 TO 2 TABLETS BY MOUTH DAILY DURING THE DAY AS NEEDED FOR MUSCLE SPASMS 40 tablet 1     nicotine (NICODERM CQ) 21 MG/24HR 24 hr patch Place 1 patch onto the skin every 24 hours (Patient not taking: Reported on 10/12/2022) 21 patch 1     nicotine (NICORETTE) 2 MG gum PLACE 1 PIECE OF GUM IN CHEEK AS NEEDED FOR SMOKING CESSATION (Patient not taking: Reported on 10/12/2022) 100 each 3     omeprazole (PRILOSEC) 20 MG DR capsule Take 1 capsule (20 mg) by mouth daily Take with diclofenac to protect stomach. 90 capsule 1     omeprazole (PRILOSEC) 40 MG DR capsule Take 1 capsule (40 mg) by mouth daily 30 capsule 0     oxyCODONE (ROXICODONE) 5 MG tablet 1 tab daily as needed for wrist pain. Avoid within 8 hours of klonopin or ALCOHOL ok early refill. 30 tablet 0     polyethylene glycol (MIRALAX) 17 GM/Dose powder Take 17 g (1 capful) by mouth daily (Patient not taking: Reported on 12/20/2022) 850 g 0     propranolol (INDERAL) 20 MG tablet TAKE 1 TABLET(20 MG) BY MOUTH THREE TIMES DAILY AS NEEDED FOR ANXIETY OR HIGH  BLOOD PRESSURE. MAY DOUBLE DOSAGE AS NEEDED (Patient not taking: Reported on 2022) 90 tablet 1     sertraline (ZOLOFT) 100 MG tablet TAKE 1.5 TABLETS BY MOUTH FOR DEPRESSION AND ANXIETY. (Patient not taking: Reported on 10/12/2022) 135 tablet 1     traZODone (DESYREL) 50 MG tablet TAKE 1 TABLET(50 MG) BY MOUTH EVERY NIGHT AS NEEDED FOR SLEEP. DO NOT TAKE WITH FLEXERIL 30 tablet 3       Patient Active Problem List   Diagnosis     CARDIOVASCULAR SCREENING; LDL GOAL LESS THAN 160     BMI 30.0-30.9,adult     ADD (attention deficit disorder)     Generalized anxiety disorder     Intermittent explosive disorder     Mid back pain     Intractable chronic migraine without aura and without status migrainosus     Major depressive disorder, recurrent episode, mild (H)     Chronic bilateral low back pain with right-sided sciatica     Attention deficit hyperactivity disorder (ADHD), other type     Scalp cyst     Primary hypertension     Acute appendicitis with localized peritonitis, without perforation, abscess, or gangrene       Past Medical History:   Diagnosis Date     ADHD      HTN        Past Surgical History:   Procedure Laterality Date     PE TUBES         Family History   Problem Relation Age of Onset     Diabetes Father        Social History     Tobacco Use     Smoking status: Former     Packs/day: 0.50     Years: 10.00     Pack years: 5.00     Types: Cigarettes     Quit date: 2010     Years since quittin.4     Smokeless tobacco: Never   Substance Use Topics     Alcohol use: Yes     Comment: OCC         Exam:    Vitals: There were no vitals taken for this visit.  BMI: There is no height or weight on file to calculate BMI.  Height: Data Unavailable    Constitutional/ general:  Pt is in no apparent distress, appears well-nourished.  Cooperative with history and physical exam.     Psych:  The patient answered questions appropriately.  Normal affect.  Seems to have reasonable expectations, in terms of  treatment.     Eyes:  Visual scanning/ tracking without deficit.     Ears:  Response to auditory stimuli is normal.  negative hearing aid devices.  Auricles in proper alignment.     Lymphatic:  Popliteal lymph nodes not enlarged.     Lungs:  Non labored breathing, non labored speech. No cough.  No audible wheezing. Even, quiet breathing.       Vascular:  positive pedal pulses bilaterally for both the DP and PT arteries.  CFT < 3 sec.  negative ankle edema.  positive pedal hair growth.    Neuro:  Alert and oriented x 3. Coordinated gait.  Light touch sensation is intact to the L4, L5, S1 distributions. No obvious deficits.  No evidence of neurological-based weakness, spasticity, or contracture in the lower extremities.      Derm: Normal texture and turgor.  No erythema, ecchymosis, or cyanosis.      Musculoskeletal:    Lower extremity muscle strength is normal.  Patient is ambulatory without an assistive device or brace.  No gross deformities.  Normal arch.  Normal ROM all forefoot and rearfoot joints.  Left first MTPJ extensor and flexor tendons intact.  No pain on palpation of the sesamoids.  Edema on the dorsum of the first MTPJ.  No erythema or ecchymosis.  Pain on the dorsum of the left first MTPJ, but less than last visit.  Pain with dorsiflexion.    Narrative & Impression   EXAM: FOOT LEFT THREE OR MORE VIEWS  DATE/TIME: 12/20/2022 1:58 PM      INDICATION: Left foot pain after a hyperextension injury.  COMPARISON: None.                                                                      IMPRESSION: Normal joint spacing and alignment.  No fracture.         Assessment: Left first MTPJ turf toe injury    Plan:  Patient improving.  Again discussed that this is a slow injury to heal.  We will start physical therapy.  Prescription for orthotics to help forefoot function more efficiently once he goes back to work.  Patient will be careful to protect this and not reinjure this.  Return to clinic in 3 weeks.    Nawaf  HOANG ZelayaM, FACFAS

## 2023-01-19 NOTE — LETTER
[facility]  [address]  [place] [address]  Phone: [phone]    January 19, 2023        Tejas Villalba III  [address] [address] [address]          To whom it may concern:    RE: Tejas Villalba III    Patient was seen and treated today at our clinic and missed work.  No work for 3 weeks  01/19/23-2/9/23      Please contact me for questions or concerns.      Sincerely,        Dr. Nawaf SOLORZANOPRUSLAN FAC FAS/lld    (Electronically Signed)

## 2023-01-23 ENCOUNTER — VIRTUAL VISIT (OUTPATIENT)
Dept: FAMILY MEDICINE | Facility: CLINIC | Age: 33
End: 2023-01-23
Payer: COMMERCIAL

## 2023-01-23 ENCOUNTER — TELEPHONE (OUTPATIENT)
Dept: PODIATRY | Facility: CLINIC | Age: 33
End: 2023-01-23

## 2023-01-23 DIAGNOSIS — F41.9 ANXIETY: ICD-10-CM

## 2023-01-23 DIAGNOSIS — M79.641 PAIN OF RIGHT HAND: ICD-10-CM

## 2023-01-23 DIAGNOSIS — G43.719 INTRACTABLE CHRONIC MIGRAINE WITHOUT AURA AND WITHOUT STATUS MIGRAINOSUS: Primary | ICD-10-CM

## 2023-01-23 DIAGNOSIS — F32.2 MAJOR DEPRESSIVE DISORDER, SINGLE EPISODE, SEVERE WITHOUT PSYCHOTIC FEATURES (H): ICD-10-CM

## 2023-01-23 DIAGNOSIS — F90.8 ATTENTION DEFICIT HYPERACTIVITY DISORDER (ADHD), OTHER TYPE: ICD-10-CM

## 2023-01-23 DIAGNOSIS — R00.2 PALPITATIONS: ICD-10-CM

## 2023-01-23 PROCEDURE — 99214 OFFICE O/P EST MOD 30 MIN: CPT | Mod: 95 | Performed by: FAMILY MEDICINE

## 2023-01-23 RX ORDER — HYDROXYZINE PAMOATE 25 MG/1
CAPSULE ORAL
Qty: 60 CAPSULE | Refills: 1 | Status: SHIPPED | OUTPATIENT
Start: 2023-01-23

## 2023-01-23 RX ORDER — SUMATRIPTAN 25 MG/1
25 TABLET, FILM COATED ORAL
Qty: 12 TABLET | Refills: 1 | Status: SHIPPED | OUTPATIENT
Start: 2023-01-23

## 2023-01-23 RX ORDER — OXYCODONE HYDROCHLORIDE 5 MG/1
TABLET ORAL
Qty: 30 TABLET | Refills: 0 | Status: SHIPPED | OUTPATIENT
Start: 2023-01-23 | End: 2023-02-17

## 2023-01-23 ASSESSMENT — PATIENT HEALTH QUESTIONNAIRE - PHQ9
10. IF YOU CHECKED OFF ANY PROBLEMS, HOW DIFFICULT HAVE THESE PROBLEMS MADE IT FOR YOU TO DO YOUR WORK, TAKE CARE OF THINGS AT HOME, OR GET ALONG WITH OTHER PEOPLE: SOMEWHAT DIFFICULT
SUM OF ALL RESPONSES TO PHQ QUESTIONS 1-9: 3
SUM OF ALL RESPONSES TO PHQ QUESTIONS 1-9: 3

## 2023-01-23 NOTE — PROGRESS NOTES
Ousmane is a 32 year old who is being evaluated via a billable video visit.      How would you like to obtain your AVS? MyChart  If the video visit is dropped, the invitation should be resent by: Text to cell phone: [phone]      ASSESSMENT / PLAN:  (G49.965) Intractable chronic migraine without aura and without status migrainosus  (primary encounter diagnosis)  Comment: needs help  Plan: hydrOXYzine (VISTARIL) 25 MG capsule,         SUMAtriptan (IMITREX) 25 MG tablet        Will need to hold fiorcet while on oxycodone. Reveiwed risks and side effects of medication  Add prn imitrex/vistaril. Neurology if worse. Exercise and avoid with ALCOHOL.     (B76.351) Pain of right hand  Plan: oxyCODONE (ROXICODONE) 5 MG tablet        Per hand specialist - possible surgery in future. Reveiwed risks and side effects of medication  Avoid with ALCOHOL.     (F90.8) Attention deficit hyperactivity disorder (ADHD), other type  Comment: stable  Plan: adderall.   Recheck in 3 months      (F41.9) Anxiety  Comment: stable  Plan: hydrOXYzine (VISTARIL) 25 MG capsule        Reveiwed risks and side effects of medication  If SUICIAL IDEATION OR HOMOCIDAL IDEATION OR BERNADETTE TO ER         Subjective   Ousmane is a 32 year old presenting for the following health issues:  Follow-up adhd, anxiety, migraines, tobacco abuse, insomnia and chronic pain issues.  Seeing son on saturdays 7yo now.   Work - out for 2.5 weeks -foot injury.  Hand going to get surgery - appointment soon.   Emotionally doing ok. On/off dating.   Would like continue oxycondone instead - fiorcet. Occasionally ALCOHOL socially.     No chief complaint on file.      History of Present Illness       Reason for visit:  Medications    He eats 4 or more servings of fruits and vegetables daily.He consumes 1 sweetened beverage(s) daily.He exercises with enough effort to increase his heart rate 30 to 60 minutes per day.  He exercises with enough effort to increase his heart rate 5 days per  week.   He is taking medications regularly.    Today's PHQ-9         PHQ-9 Total Score: 3    PHQ-9 Q9 Thoughts of better off dead/self-harm past 2 weeks :   Not at all    How difficult have these problems made it for you to do your work, take care of things at home, or get along with other people: Somewhat difficult       Review of Systems         Objective           Vitals:  No vitals were obtained today due to virtual visit.    Physical Exam   GENERAL: Healthy, alert and no distress  EYES: Eyes grossly normal to inspection.  No discharge or erythema, or obvious scleral/conjunctival abnormalities.  RESP: No audible wheeze, cough, or visible cyanosis.  No visible retractions or increased work of breathing.    SKIN: Visible skin clear. No significant rash, abnormal pigmentation or lesions.  NEURO: Cranial nerves grossly intact.  Mentation and speech appropriate for age.  PSYCH: Mentation appears normal, affect normal/bright, judgement and insight intact, normal speech and appearance well-groomed.        Video-Visit Details    Type of service:  Video Visit   Video Start Time: 4:22 PM  Video End Time:4:32 PM    Originating Location (pt. Location): Home  Distant Location (provider location):  On-site  Platform used for Video Visit: [employer]

## 2023-01-23 NOTE — TELEPHONE ENCOUNTER
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  For question contact Rosy -  : [phone]

## 2023-01-27 ENCOUNTER — MYC MEDICAL ADVICE (OUTPATIENT)
Dept: PODIATRY | Facility: CLINIC | Age: 33
End: 2023-01-27
Payer: COMMERCIAL

## 2023-01-27 ENCOUNTER — THERAPY VISIT (OUTPATIENT)
Dept: PHYSICAL THERAPY | Facility: CLINIC | Age: 33
End: 2023-01-27
Payer: OTHER MISCELLANEOUS

## 2023-01-27 DIAGNOSIS — M79.675 PAIN OF TOE OF LEFT FOOT: ICD-10-CM

## 2023-01-27 DIAGNOSIS — M79.672 LEFT FOOT PAIN: ICD-10-CM

## 2023-01-27 PROCEDURE — 97161 PT EVAL LOW COMPLEX 20 MIN: CPT | Mod: GP | Performed by: PHYSICAL THERAPIST

## 2023-01-27 PROCEDURE — 97110 THERAPEUTIC EXERCISES: CPT | Mod: GP | Performed by: PHYSICAL THERAPIST

## 2023-01-27 NOTE — PROGRESS NOTES
Physical Therapy Initial Evaluation  Subjective:  The history is provided by the patient.   Patient Health History  Tejas Villalba III being seen for L foot/ big toe.     Problem began: 12/20/2022.   Problem occurred: fell at work   Pain is reported as 5/10 on pain scale.  General health as reported by patient is good.  Pertinent medical history includes: depression, high blood pressure, migraines/headaches and smoking.      Other medical allergies details: antibiotics.   Surgeries include:  Other. Other surgery history details: ear and cyst.    Current medications:  Anti-inflammatory, high blood pressure medication, muscle relaxants, sleep medication and pain medication. Other medications details: ADHD.    Current occupation is / delivery.   Primary job tasks include:  Driving, lifting/carrying, pushing/pulling and repetitive tasks.   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                Therapist Generated HPI Evaluation  Problem details: 12/20/2022 - slipped off truck and bent big toe up.  Originally middle of foot was swollen and bruised.  Big toe is the most painful now.  .         Type of problem:  Left foot.    This is a new condition.  Condition occurred with:  A fall/slip and a wrong landing.  Where condition occurred: at work.  Patient reports pain:  Great toe and anterior.  Pain is described as aching and sharp and is constant.  Pain radiates to:  No radiation. Pain is the same all the time.  Since onset symptoms are gradually improving.  Associated symptoms:  Edema and loss of motion/stiffness. Symptoms are exacerbated by walking, standing, ascending stairs and descending stairs  and relieved by ice and bracing/immobilizing (plate in shoe).  Special tests included:  X-ray (normal ).    Restrictions due to condition include:  Currently not working due to present treatment.                          Objective:    Gait:  Places minimal to no weight through great  toe  Gait Type:  Antalgic     Deviations:  Ankle:  Push off decr L          Ankle/Foot Evaluation  ROM:  Arom ankle eval: painful.  AROM:    Dorsiflexion:  Left:   Normal   Right:   Normal   Plantarflexion:  Left:  Normal     Right:  Normal         Great Toe Extension:  Left:  10     Right: 45  PROM:    Dorsiflexion:  Left:    Normal      Right:   Normal    Plantarflexion:  Left:    Normal     Right:  Normal         Great Toe Extension:  Left:    15     Right: 51      Strength wnl ankle: not formally assessed at toe due to pain.  LIGAMENT TESTING: normal                PALPATION: Palpation of ankle: tender 1st MTP joint and 1st DIP joint;  metatarsal 1/2 and slight tnederness in 2nd toe.    EDEMA: Edema ankle: mild at first toe MTP joint.          MOBILITY TESTING: not assessed (pain)                                                                General     ROS    Assessment/Plan:    Patient is a 32 year old male with L foot/ toe pain complaints.    Patient has the following significant findings with corresponding treatment plan.                Diagnosis 1:  L foot/ toe pain  Pain -  hot/cold therapy, US, manual therapy, self management, education, directional preference exercise and home program  Decreased ROM/flexibility - manual therapy and therapeutic exercise  Decreased joint mobility - manual therapy and therapeutic exercise  Decreased strength - therapeutic exercise and therapeutic activities  Edema - cold therapy and self management/home program  Impaired muscle performance - neuro re-education  Decreased function - therapeutic activities    Therapy Evaluation Codes:   1) History comprised of:   Personal factors that impact the plan of care:      None.    Comorbidity factors that impact the plan of care are:      Depression, High blood pressure, Migraines/headaches and Smoking.     Medications impacting care: Anti-inflammatory, High blood pressure, Muscle relaxant, Pain and Sleep.  2) Examination of Body  Systems comprised of:   Body structures and functions that impact the plan of care:      Toes and foot.   Activity limitations that impact the plan of care are:      Standing and Walking.  3) Clinical presentation characteristics are:   Stable/Uncomplicated.  4) Decision-Making    Low complexity using standardized patient assessment instrument and/or measureable assessment of functional outcome.  Cumulative Therapy Evaluation is: Low complexity.    Previous and current functional limitations:  (See Goal Flow Sheet for this information)    Short term and Long term goals: (See Goal Flow Sheet for this information)     Communication ability:  Patient appears to be able to clearly communicate and understand verbal and written communication and follow directions correctly.  Treatment Explanation - The following has been discussed with the patient:   RX ordered/plan of care  Anticipated outcomes  Possible risks and side effects  This patient would benefit from PT intervention to resume normal activities.   Rehab potential is good.    Frequency:  1 X week, once daily  Duration:  for 8 weeks  Discharge Plan:  Achieve all LTG.  Independent in home treatment program.  Reach maximal therapeutic benefit.    Please refer to the daily flowsheet for treatment today, total treatment time and time spent performing 1:1 timed codes.

## 2023-01-30 ENCOUNTER — TELEPHONE (OUTPATIENT)
Dept: PHYSICAL THERAPY | Facility: CLINIC | Age: 33
End: 2023-01-30
Payer: COMMERCIAL

## 2023-01-30 DIAGNOSIS — F41.9 ANXIETY: ICD-10-CM

## 2023-01-30 DIAGNOSIS — R00.2 PALPITATIONS: ICD-10-CM

## 2023-01-30 RX ORDER — PROPRANOLOL HYDROCHLORIDE 20 MG/1
TABLET ORAL
Qty: 90 TABLET | Refills: 3 | Status: SHIPPED | OUTPATIENT
Start: 2023-01-30

## 2023-02-03 ENCOUNTER — ANCILLARY PROCEDURE (OUTPATIENT)
Dept: GENERAL RADIOLOGY | Facility: CLINIC | Age: 33
End: 2023-02-03
Attending: STUDENT IN AN ORGANIZED HEALTH CARE EDUCATION/TRAINING PROGRAM
Payer: COMMERCIAL

## 2023-02-03 ENCOUNTER — OFFICE VISIT (OUTPATIENT)
Dept: ORTHOPEDICS | Facility: CLINIC | Age: 33
End: 2023-02-03
Payer: COMMERCIAL

## 2023-02-03 DIAGNOSIS — S62.021K: Primary | ICD-10-CM

## 2023-02-03 DIAGNOSIS — S62.021K: ICD-10-CM

## 2023-02-03 PROCEDURE — 99214 OFFICE O/P EST MOD 30 MIN: CPT | Performed by: STUDENT IN AN ORGANIZED HEALTH CARE EDUCATION/TRAINING PROGRAM

## 2023-02-03 PROCEDURE — 73110 X-RAY EXAM OF WRIST: CPT | Mod: RT | Performed by: RADIOLOGY

## 2023-02-03 NOTE — LETTER
2/3/2023         RE: Tejas Villalba III  [address] [address] [address]        Dear Colleague,    Thank you for referring your patient, Tejas Villalba III, to the [facility]. Please see a copy of my visit note below.    Ortho Hand    Returns to clinic.  Still has pain in the right wrist.  Reviewed imaging studies.    On exam, still with tenderness of the right snuffbox.  Right active wrist extension and flexion limited to 45 degrees.    CT: Right scaphoid waist fracture nonunion  MR: Right scaphoid with diffuse T2 signal concerning for possible avascular necrosis  XR: Right scaphoid waist fracture nonunion with no obvious signs of arthritis except for perhaps the radial styloid    A/P: 32-year-old male 9 months status post right scaphoid fracture resenting with nonunion    -Discussed options for management.  Patient is motivated for surgery.  The options for surgery include right scaphoid waist fracture nonunion debridement with bone grafting and cannulated compression screw fixation versus scaphoidectomy with midcarpal or four bone fusion.  It is possible that after debridement of the scaphoid nonunion, there is evidence of punctate bleeding.  If there is no radial scaphoid arthritis, it is very reasonable to consider nonunion repair with bone grafting and screw fixation.  However, if the scaphoid is not well perfused or there is radial scaphoid arthritis, my recommendation would then be scaphoidectomy with partial wrist fusion.  Patient is motivated and would like to proceed.  -Discussed the risks of surgery, including but not limited to: Infection, bleeding, pain, poor scarring, nonunion, malunion, hardware failure, prominent hardware requiring removal, need for revision surgery, prolonged stiffness, persistent pain, complex regional pain syndrome, dorsal impingement with need for hardware removal, extensor tendon irritation.  Despite these risks, patient  consents to and would like to proceed with right scaphoid mid waist nonunion debridement with possible bone grafting with cannulated compression screw internal fixation, possible scaphoidectomy with four bone fusion.  Discussed the motion preserving nature of the partial wrist fusion, with the expectation that there is loss of  strength of approximately 25%.  Patient understands this and accepts these risks and benefits.  -Case request placed  -MAC with block  -A total of 30 minutes was devoted to review of chart, direct face-to-face patient counseling and documentation during this encounter, exclusive of any procedure performed.    Lawrence Lerner MD, PhD        Again, thank you for allowing me to participate in the care of your patient.        Sincerely,        Lawrence Lerner MD

## 2023-02-03 NOTE — PROGRESS NOTES
Ortho Hand    Returns to clinic.  Still has pain in the right wrist.  Reviewed imaging studies.    On exam, still with tenderness of the right snuffbox.  Right active wrist extension and flexion limited to 45 degrees.    CT: Right scaphoid waist fracture nonunion  MR: Right scaphoid with diffuse T2 signal concerning for possible avascular necrosis  XR: Right scaphoid waist fracture nonunion with no obvious signs of arthritis except for perhaps the radial styloid    A/P: 32-year-old male status post right scaphoid fracture w unclear timing presenting with nonunion/fragmentation/poss AVN    -Discussed options for management.  Patient is motivated for surgery.  The options for surgery include right scaphoid waist fracture nonunion debridement with bone grafting and cannulated compression screw fixation versus scaphoidectomy with midcarpal or four bone fusion.  It is possible that after debridement of the scaphoid nonunion, there is evidence of punctate bleeding.  If there is no radial scaphoid arthritis, it is very reasonable to consider nonunion repair with bone grafting and screw fixation.  However, if the scaphoid is not well perfused or there is radial scaphoid arthritis, my recommendation would then be scaphoidectomy with partial wrist fusion. There is a high likelihood we will not be able to salvage the scaphoid. Patient is motivated and would like to proceed.  -Discussed the risks of surgery, including but not limited to: Infection, bleeding, pain, poor scarring, nonunion, malunion, hardware failure, prominent hardware requiring removal, need for revision surgery, prolonged stiffness, persistent pain, complex regional pain syndrome, dorsal impingement with need for hardware removal, extensor tendon irritation.  Despite these risks, patient consents to and would like to proceed with right scaphoid mid waist nonunion debridement with possible bone grafting with cannulated compression screw internal fixation,  possible scaphoidectomy with four bone fusion.  Discussed the motion preserving nature of the partial wrist fusion, with the expectation that there is loss of  strength of approximately 25%.  Patient understands this and accepts these risks and benefits.  -Case request placed  -MAC with block  -A total of 30 minutes was devoted to review of chart, direct face-to-face patient counseling and documentation during this encounter, exclusive of any procedure performed.    Lawrence Lerner MD, PhD

## 2023-02-06 ENCOUNTER — TELEPHONE (OUTPATIENT)
Dept: SURGERY | Facility: CLINIC | Age: 33
End: 2023-02-06
Payer: COMMERCIAL

## 2023-02-06 ENCOUNTER — THERAPY VISIT (OUTPATIENT)
Dept: PHYSICAL THERAPY | Facility: CLINIC | Age: 33
End: 2023-02-06
Attending: PODIATRIST
Payer: OTHER MISCELLANEOUS

## 2023-02-06 DIAGNOSIS — S93.602A FOOT SPRAIN, LEFT, INITIAL ENCOUNTER: ICD-10-CM

## 2023-02-06 DIAGNOSIS — M79.672 LEFT FOOT PAIN: Primary | ICD-10-CM

## 2023-02-06 DIAGNOSIS — M79.675 PAIN OF TOE OF LEFT FOOT: ICD-10-CM

## 2023-02-06 PROBLEM — S62.021K: Status: ACTIVE | Noted: 2023-02-06

## 2023-02-06 PROCEDURE — 97110 THERAPEUTIC EXERCISES: CPT | Mod: GP | Performed by: PHYSICAL THERAPIST

## 2023-02-06 PROCEDURE — 97035 APP MDLTY 1+ULTRASOUND EA 15: CPT | Mod: GP | Performed by: PHYSICAL THERAPIST

## 2023-02-06 PROCEDURE — 97140 MANUAL THERAPY 1/> REGIONS: CPT | Mod: GP | Performed by: PHYSICAL THERAPIST

## 2023-02-06 NOTE — TELEPHONE ENCOUNTER
Called and LVM for patient to schedule surgeryt with Dr. Lerner. Provided direct call back number [phone].

## 2023-02-07 ENCOUNTER — MYC MEDICAL ADVICE (OUTPATIENT)
Dept: PODIATRY | Facility: CLINIC | Age: 33
End: 2023-02-07
Payer: COMMERCIAL

## 2023-02-07 ENCOUNTER — OFFICE VISIT (OUTPATIENT)
Dept: PODIATRY | Facility: CLINIC | Age: 33
End: 2023-02-07
Payer: OTHER MISCELLANEOUS

## 2023-02-07 VITALS — SYSTOLIC BLOOD PRESSURE: 150 MMHG | DIASTOLIC BLOOD PRESSURE: 90 MMHG | HEART RATE: 120 BPM

## 2023-02-07 DIAGNOSIS — S93.602A FOOT SPRAIN, LEFT, INITIAL ENCOUNTER: Primary | ICD-10-CM

## 2023-02-07 PROCEDURE — 99213 OFFICE O/P EST LOW 20 MIN: CPT | Performed by: PODIATRIST

## 2023-02-07 NOTE — LETTER
2/7/2023         RE: Tejas Villalba III  [address] [address] [facility] [address]        Dear Colleague,    Thank you for referring your patient, Tejas Villalba III, to the [facility]. Please see a copy of my visit note below.     Subjective:    12/28/22   Pt is seen today for injury to left big toe.  On December 20, 2022 patient was at work.  He fell and when he landed he hyperextended his big toe.  Had pain on the dorsum of the joint.  Pain aggravated by activity and relieved by rest.  He had swelling and bruising.  Denies erythema.  Denies increased deformity.  Denies numbness.  He works driving and unloading trucks.  He has been at home since injury.  He was given postop shoe.  Denies past history of injury to this area before.    1/19/23 patient is 4 weeks 2 days status post left hallux turf toe injury on December 20, 2022.  Patient states he is 30 to 40% better.  Still has pain dorsally.  Edema is decreasing.  Can move it slightly easier.  No other new changes.    2/7/23  Patient is 7 weeks s/p left hallux turf toeinjury on 12/20/22.   Patient states swelling has decreasing.  He has less pain.  He can move this easier.  He has gone to physical therapy and he believes this has been helpful.  He would like to continue this.  He is in the process of getting orthotics.  He has no other new complaints today.  Denies any weakness or numbness         ROS: See above         Allergies   Allergen Reactions     Amoxicillin      Ceclor [Cefaclor]      Codeine Itching     Erythromycin      Septra [Bactrim]      Suprax [Cefixime]        Current Outpatient Medications   Medication Sig Dispense Refill     amphetamine-dextroamphetamine (ADDERALL) 15 MG tablet Take 2 tablets (30 mg) by mouth 2 times daily Temporary change in adderall until XR available 120 tablet 0     amphetamine-dextroamphetamine (ADDERALL) 20 MG tablet Take 1 tablet (20 mg) by mouth 3 times daily As needed  for inattention. temporarily replaces adderall xr (Patient not taking: Reported on 1/23/2023) 90 tablet 0     amphetamine-dextroamphetamine (ADDERALL) 30 MG tablet Take 1 tablet (30 mg) by mouth 2 times daily Stop the adderall XR (Patient not taking: Reported on 1/23/2023) 60 tablet 0     calcium carbonate 1250 (500 Ca) MG CHEW Take 500 mg by mouth       clonazePAM (KLONOPIN) 0.5 MG ODT DISSOLVE 1 TABLET(0.5 MG) ON THE TONGUE TWICE DAILY AS NEEDED FOR ANXIETY (Patient not taking: Reported on 10/12/2022) 30 tablet 0     clonazePAM (KLONOPIN) 0.5 MG tablet TAKE 1 TABLET(0.5 MG) BY MOUTH TWICE DAILY AS NEEDED FOR ANXIETY (Patient not taking: Reported on 10/12/2022) 20 tablet 0     cyclobenzaprine (FLEXERIL) 10 MG tablet TAKE 1/2 TO 1 TABLET(5 TO 10 MG) BY MOUTH EVERY NIGHT AS NEEDED FOR MUSCLE SPASMS 30 tablet 1     diclofenac (VOLTAREN) 50 MG EC tablet Take 1 tablet (50 mg) by mouth 2 times daily as needed for moderate pain 60 tablet 0     gabapentin (NEURONTIN) 300 MG capsule TAKE 1 CAPSULE(300 MG) BY MOUTH EVERY NIGHT AS NEEDED FOR SLEEP OR BACK PAIN (Patient not taking: Reported on 12/20/2022) 90 capsule 0     hydrOXYzine (VISTARIL) 25 MG capsule 1-2 tabs every 12 hours as needed for pain/anxiety or migraines. May cause drowsiness 60 capsule 1     hydrOXYzine (VISTARIL) 25 MG capsule TAKE 1 TO 2 CAPSULES BY MOUTH AT BEDTIME AS NEEDED FOR SLEEP OR ANXIETY (Patient not taking: Reported on 12/20/2022) 30 capsule 1     IBUPROFEN PO Take 600 mg by mouth 2 times daily As needed       methocarbamol (ROBAXIN) 500 MG tablet TAKE 1 TO 2 TABLETS BY MOUTH DAILY DURING THE DAY AS NEEDED FOR MUSCLE SPASMS 40 tablet 1     nicotine (NICODERM CQ) 21 MG/24HR 24 hr patch Place 1 patch onto the skin every 24 hours (Patient not taking: Reported on 10/12/2022) 21 patch 1     nicotine (NICORETTE) 2 MG gum PLACE 1 PIECE OF GUM IN CHEEK AS NEEDED FOR SMOKING CESSATION (Patient not taking: Reported on 10/12/2022) 100 each 3     omeprazole  (PRILOSEC) 20 MG DR capsule Take 1 capsule (20 mg) by mouth daily Take with diclofenac to protect stomach. 90 capsule 1     omeprazole (PRILOSEC) 40 MG DR capsule Take 1 capsule (40 mg) by mouth daily (Patient not taking: Reported on 1/23/2023) 30 capsule 0     oxyCODONE (ROXICODONE) 5 MG tablet 1 tab daily as needed for wrist pain. Avoid within 8 hours of klonopin or ALCOHOL ok early refill. 30 tablet 0     polyethylene glycol (MIRALAX) 17 GM/Dose powder Take 17 g (1 capful) by mouth daily (Patient not taking: Reported on 12/20/2022) 850 g 0     propranolol (INDERAL) 20 MG tablet TAKE 1 TABLET(20 MG) BY MOUTH THREE TIMES DAILY AS NEEDED FOR ANXIETY OR HIGH BLOOD PRESSURE. MAY DOUBLE DOSAGE AS NEEDED 90 tablet 3     sertraline (ZOLOFT) 100 MG tablet TAKE 1.5 TABLETS BY MOUTH FOR DEPRESSION AND ANXIETY. (Patient not taking: Reported on 10/12/2022) 135 tablet 1     SUMAtriptan (IMITREX) 25 MG tablet Take 1 tablet (25 mg) by mouth at onset of headache for migraine May repeat in 2 hours. Max 8 tablets/24 hours. 12 tablet 1     traZODone (DESYREL) 50 MG tablet TAKE 1 TABLET(50 MG) BY MOUTH EVERY NIGHT AS NEEDED FOR SLEEP. DO NOT TAKE WITH FLEXERIL 30 tablet 3       Patient Active Problem List   Diagnosis     CARDIOVASCULAR SCREENING; LDL GOAL LESS THAN 160     BMI 30.0-30.9,adult     ADD (attention deficit disorder)     Generalized anxiety disorder     Intermittent explosive disorder     Mid back pain     Intractable chronic migraine without aura and without status migrainosus     Major depressive disorder, recurrent episode, mild (H)     Chronic bilateral low back pain with right-sided sciatica     Attention deficit hyperactivity disorder (ADHD), other type     Scalp cyst     Primary hypertension     Acute appendicitis with localized peritonitis, without perforation, abscess, or gangrene     Left foot pain     Pain of toe of left foot     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, right       Past  Medical History:   Diagnosis Date     ADHD      HTN        Past Surgical History:   Procedure Laterality Date     PE TUBES         Family History   Problem Relation Age of Onset     Diabetes Father        Social History     Tobacco Use     Smoking status: Former     Packs/day: 0.50     Years: 10.00     Pack years: 5.00     Types: Cigarettes     Quit date: 2010     Years since quittin.4     Smokeless tobacco: Never   Substance Use Topics     Alcohol use: Yes     Comment: OCC         Exam:    Vitals: BP (!) 150/90   Pulse 120   BMI: There is no height or weight on file to calculate BMI.  Height: Data Unavailable    Constitutional/ general:  Pt is in no apparent distress, appears well-nourished.  Cooperative with history and physical exam.     Psych:  The patient answered questions appropriately.  Normal affect.  Seems to have reasonable expectations, in terms of treatment.     Eyes:  Visual scanning/ tracking without deficit.     Ears:  Response to auditory stimuli is normal.  negative hearing aid devices.  Auricles in proper alignment.     Lymphatic:  Popliteal lymph nodes not enlarged.     Lungs:  Non labored breathing, non labored speech. No cough.  No audible wheezing. Even, quiet breathing.       Vascular:  positive pedal pulses bilaterally for both the DP and PT arteries.  CFT < 3 sec.  negative ankle edema.  positive pedal hair growth.    Neuro:  Alert and oriented x 3. Coordinated gait.  Light touch sensation is intact    Derm: Normal texture and turgor.  No erythema, ecchymosis, or cyanosis.      Musculoskeletal:    Lower extremity muscle strength is normal.  Patient is ambulatory without an assistive device or brace.  No gross deformities.  Normal arch.  Normal ROM all forefoot and rearfoot joints.  Left first MTPJ extensor and flexor tendons intact.  No pain on palpation of the sesamoids.  Edema on the dorsum of the first MTPJ has decreased and is minimal now.  No erythema or ecchymosis.  Pain on  the dorsum of the left first MTPJ, but less than last visit.  Pain with dorsiflexion and plantarflexion but range of motion somewhat easier.    Narrative & Impression   EXAM: FOOT LEFT THREE OR MORE VIEWS  DATE/TIME: 12/20/2022 1:58 PM      INDICATION: Left foot pain after a hyperextension injury.  COMPARISON: None.                                                                      IMPRESSION: Normal joint spacing and alignment.  No fracture.         Assessment: Left first MTPJ turf toe injury    Plan:  Patient improving.  Again discussed that this is a slow injury to heal.  Patient will continue physical therapy.  Encourage patient to get orthotics.  He is walking in shoe with a carbon plate which is helpful.  Patient may sit at work with light walking only occasionally.  Return to clinic in 3 weeks.    Nawaf Zelaya DPM, FACFAS           Again, thank you for allowing me to participate in the care of your patient.        Sincerely,        Nawaf Zelaya DPM

## 2023-02-07 NOTE — LETTER
[facility]  [address]  [place] [address]  Phone: [phone]    February 7, 2023        Tejas Villalba [facility]  [address] [address] [address]      To whom this may concern:    Patient was seen and treated at our clinic and missed work. Patient will be off from work all day Feb 8th ,2023     Patient may return to work 2/9/23 with the following:  Light duty-unable to bend, stoop or twist. No use of ladders or stairs. Driving only- Car   When the patient returns to work, the following restrictions apply until :  2/9/23-2/28/23      Please contact me for questions or concerns.      Sincerely,        Dr. Nawaf SOLORZANOPRUSLAN FAC FAS/lld

## 2023-02-07 NOTE — PROGRESS NOTES
Subjective:    12/28/22   Pt is seen today for injury to left big toe.  On December 20, 2022 patient was at work.  He fell and when he landed he hyperextended his big toe.  Had pain on the dorsum of the joint.  Pain aggravated by activity and relieved by rest.  He had swelling and bruising.  Denies erythema.  Denies increased deformity.  Denies numbness.  He works driving and unloading trucks.  He has been at home since injury.  He was given postop shoe.  Denies past history of injury to this area before.    1/19/23 patient is 4 weeks 2 days status post left hallux turf toe injury on December 20, 2022.  Patient states he is 30 to 40% better.  Still has pain dorsally.  Edema is decreasing.  Can move it slightly easier.  No other new changes.    2/7/23  Patient is 7 weeks s/p left hallux turf toeinjury on 12/20/22.   Patient states swelling has decreasing.  He has less pain.  He can move this easier.  He has gone to physical therapy and he believes this has been helpful.  He would like to continue this.  He is in the process of getting orthotics.  He has no other new complaints today.  Denies any weakness or numbness         ROS: See above         Allergies   Allergen Reactions     Amoxicillin      Ceclor [Cefaclor]      Codeine Itching     Erythromycin      Septra [Bactrim]      Suprax [Cefixime]        Current Outpatient Medications   Medication Sig Dispense Refill     amphetamine-dextroamphetamine (ADDERALL) 15 MG tablet Take 2 tablets (30 mg) by mouth 2 times daily Temporary change in adderall until XR available 120 tablet 0     amphetamine-dextroamphetamine (ADDERALL) 20 MG tablet Take 1 tablet (20 mg) by mouth 3 times daily As needed for inattention. temporarily replaces adderall xr (Patient not taking: Reported on 1/23/2023) 90 tablet 0     amphetamine-dextroamphetamine (ADDERALL) 30 MG tablet Take 1 tablet (30 mg) by mouth 2 times daily Stop the adderall XR (Patient not taking: Reported on 1/23/2023) 60  tablet 0     calcium carbonate 1250 (500 Ca) MG CHEW Take 500 mg by mouth       clonazePAM (KLONOPIN) 0.5 MG ODT DISSOLVE 1 TABLET(0.5 MG) ON THE TONGUE TWICE DAILY AS NEEDED FOR ANXIETY (Patient not taking: Reported on 10/12/2022) 30 tablet 0     clonazePAM (KLONOPIN) 0.5 MG tablet TAKE 1 TABLET(0.5 MG) BY MOUTH TWICE DAILY AS NEEDED FOR ANXIETY (Patient not taking: Reported on 10/12/2022) 20 tablet 0     cyclobenzaprine (FLEXERIL) 10 MG tablet TAKE 1/2 TO 1 TABLET(5 TO 10 MG) BY MOUTH EVERY NIGHT AS NEEDED FOR MUSCLE SPASMS 30 tablet 1     diclofenac (VOLTAREN) 50 MG EC tablet Take 1 tablet (50 mg) by mouth 2 times daily as needed for moderate pain 60 tablet 0     gabapentin (NEURONTIN) 300 MG capsule TAKE 1 CAPSULE(300 MG) BY MOUTH EVERY NIGHT AS NEEDED FOR SLEEP OR BACK PAIN (Patient not taking: Reported on 12/20/2022) 90 capsule 0     hydrOXYzine (VISTARIL) 25 MG capsule 1-2 tabs every 12 hours as needed for pain/anxiety or migraines. May cause drowsiness 60 capsule 1     hydrOXYzine (VISTARIL) 25 MG capsule TAKE 1 TO 2 CAPSULES BY MOUTH AT BEDTIME AS NEEDED FOR SLEEP OR ANXIETY (Patient not taking: Reported on 12/20/2022) 30 capsule 1     IBUPROFEN PO Take 600 mg by mouth 2 times daily As needed       methocarbamol (ROBAXIN) 500 MG tablet TAKE 1 TO 2 TABLETS BY MOUTH DAILY DURING THE DAY AS NEEDED FOR MUSCLE SPASMS 40 tablet 1     nicotine (NICODERM CQ) 21 MG/24HR 24 hr patch Place 1 patch onto the skin every 24 hours (Patient not taking: Reported on 10/12/2022) 21 patch 1     nicotine (NICORETTE) 2 MG gum PLACE 1 PIECE OF GUM IN CHEEK AS NEEDED FOR SMOKING CESSATION (Patient not taking: Reported on 10/12/2022) 100 each 3     omeprazole (PRILOSEC) 20 MG DR capsule Take 1 capsule (20 mg) by mouth daily Take with diclofenac to protect stomach. 90 capsule 1     omeprazole (PRILOSEC) 40 MG DR capsule Take 1 capsule (40 mg) by mouth daily (Patient not taking: Reported on 1/23/2023) 30 capsule 0     oxyCODONE  (ROXICODONE) 5 MG tablet 1 tab daily as needed for wrist pain. Avoid within 8 hours of klonopin or ALCOHOL ok early refill. 30 tablet 0     polyethylene glycol (MIRALAX) 17 GM/Dose powder Take 17 g (1 capful) by mouth daily (Patient not taking: Reported on 12/20/2022) 850 g 0     propranolol (INDERAL) 20 MG tablet TAKE 1 TABLET(20 MG) BY MOUTH THREE TIMES DAILY AS NEEDED FOR ANXIETY OR HIGH BLOOD PRESSURE. MAY DOUBLE DOSAGE AS NEEDED 90 tablet 3     sertraline (ZOLOFT) 100 MG tablet TAKE 1.5 TABLETS BY MOUTH FOR DEPRESSION AND ANXIETY. (Patient not taking: Reported on 10/12/2022) 135 tablet 1     SUMAtriptan (IMITREX) 25 MG tablet Take 1 tablet (25 mg) by mouth at onset of headache for migraine May repeat in 2 hours. Max 8 tablets/24 hours. 12 tablet 1     traZODone (DESYREL) 50 MG tablet TAKE 1 TABLET(50 MG) BY MOUTH EVERY NIGHT AS NEEDED FOR SLEEP. DO NOT TAKE WITH FLEXERIL 30 tablet 3       Patient Active Problem List   Diagnosis     CARDIOVASCULAR SCREENING; LDL GOAL LESS THAN 160     BMI 30.0-30.9,adult     ADD (attention deficit disorder)     Generalized anxiety disorder     Intermittent explosive disorder     Mid back pain     Intractable chronic migraine without aura and without status migrainosus     Major depressive disorder, recurrent episode, mild (H)     Chronic bilateral low back pain with right-sided sciatica     Attention deficit hyperactivity disorder (ADHD), other type     Scalp cyst     Primary hypertension     Acute appendicitis with localized peritonitis, without perforation, abscess, or gangrene     Left foot pain     Pain of toe of left foot     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, right       Past Medical History:   Diagnosis Date     ADHD      HTN        Past Surgical History:   Procedure Laterality Date     PE TUBES         Family History   Problem Relation Age of Onset     Diabetes Father        Social History     Tobacco Use     Smoking status: Former      Packs/day: 0.50     Years: 10.00     Pack years: 5.00     Types: Cigarettes     Quit date: 2010     Years since quittin.4     Smokeless tobacco: Never   Substance Use Topics     Alcohol use: Yes     Comment: OCC         Exam:    Vitals: BP (!) 150/90   Pulse 120   BMI: There is no height or weight on file to calculate BMI.  Height: Data Unavailable    Constitutional/ general:  Pt is in no apparent distress, appears well-nourished.  Cooperative with history and physical exam.     Psych:  The patient answered questions appropriately.  Normal affect.  Seems to have reasonable expectations, in terms of treatment.     Eyes:  Visual scanning/ tracking without deficit.     Ears:  Response to auditory stimuli is normal.  negative hearing aid devices.  Auricles in proper alignment.     Lymphatic:  Popliteal lymph nodes not enlarged.     Lungs:  Non labored breathing, non labored speech. No cough.  No audible wheezing. Even, quiet breathing.       Vascular:  positive pedal pulses bilaterally for both the DP and PT arteries.  CFT < 3 sec.  negative ankle edema.  positive pedal hair growth.    Neuro:  Alert and oriented x 3. Coordinated gait.  Light touch sensation is intact    Derm: Normal texture and turgor.  No erythema, ecchymosis, or cyanosis.      Musculoskeletal:    Lower extremity muscle strength is normal.  Patient is ambulatory without an assistive device or brace.  No gross deformities.  Normal arch.  Normal ROM all forefoot and rearfoot joints.  Left first MTPJ extensor and flexor tendons intact.  No pain on palpation of the sesamoids.  Edema on the dorsum of the first MTPJ has decreased and is minimal now.  No erythema or ecchymosis.  Pain on the dorsum of the left first MTPJ, but less than last visit.  Pain with dorsiflexion and plantarflexion but range of motion somewhat easier.    Narrative & Impression   EXAM: FOOT LEFT THREE OR MORE VIEWS  DATE/TIME: 2022 1:58 PM      INDICATION: Left foot  [FreeTextEntry1] : \par PMH as noted below:\par Coronary artery disease status post CABG, which includes LIMA to LAD. 4/2010 [facility].  Nuclear stress test from 10/17 with no significant perfusion defect. \par \par Essential hypertension:  Well controlled. \par \par History of having gastric ulcer with a GI bleed in December.  s/p transfusions. GI evaluation for PUD, followed with Dr. Ghosh in the past.\par \par Dyslipidemia. Remaining on statin.\par \par Moderate mitral regurgitation. Remaining stable.\par \par Mild pulmonary hypertension.  No CHF\par \par History of depression. improved.\par \par History of syncopal episode.  Implantable loop recorder. No significant findings.\par \par Bilateral moderate carotid stenosis.  Per past note; No significant change when evaluated in April 2015.  Remaining without neurological events \par  pain after a hyperextension injury.  COMPARISON: None.                                                                      IMPRESSION: Normal joint spacing and alignment.  No fracture.         Assessment: Left first MTPJ turf toe injury    Plan:  Patient improving.  Again discussed that this is a slow injury to heal.  Patient will continue physical therapy.  Encourage patient to get orthotics.  He is walking in shoe with a carbon plate which is helpful.  Patient may sit at work with light walking only occasionally.  Return to clinic in 3 weeks.    Nawaf Zelaya DPM, FACFAS

## 2023-02-07 NOTE — LETTER
[facility] [place]  [address]  [place] [address]  Phone: [phone]  Fax: [phone]    February 7, 2023        Tejas Villalba III  [address] [address]          To whom it may concern:    RE: Tejas Villalba III    Patient was seen and treated at our clinic and missed work.  Patient may return to work  with the following:  Light duty-unable to bend, stoop or twist. No use of ladders or stairs. Driving only- Car   When the patient returns to work, the following restrictions apply until :  2/9/23-2/28/23  Please contact me for questions or concerns.      Sincerely,        Dr. Nawaf SOLORZANOPRUSLAN FAC FAS/ayeshad

## 2023-02-07 NOTE — PATIENT INSTRUCTIONS
We wish you continued good healing. If you have any questions or concerns, please do not hesitate to contact us at  [phone]    [employer] (secure e-mail communication and access to your chart) to send a message or to make an appointment.    Please remember to call and schedule a follow up appointment if one was recommended at your earliest convenience.     PODIATRY CLINIC HOURS  TELEPHONE NUMBER    Dr. Nawaf SOLORZANOPRUSLAN [facility]        Clinics:  [facility]   [place]  Tuesday 1PM-6PM  [place]  Wednesday 745AM-330PM  [place]  Thursday/Friday 745AM-230PM       [facility] APPOINTMENTS  [phone]    [place] APPOINTMENTS  [phone]        If you need a medication refill, please contact us you may need lab work and/or a follow up visit prior to your refill (i.e. Antifungal medications).  If MRI needed please call Imaging at [phone]   HOW DO I GET MY KNEE SCOOTER? Knee scooters can be picked up at ANY Medical Supply stores with your knee scooter Prescription.  OR  Bring your signed prescription to an [facility] Medical Equipment showroom.

## 2023-02-08 NOTE — TELEPHONE ENCOUNTER
Second attempt    Called and LVM for patient to schedule surgeryt with Dr. Lerner. Provided direct call back number [phone]. Will send Scandithart as well.

## 2023-02-08 NOTE — TELEPHONE ENCOUNTER
"Patient responded to [employer] message and asked:    \"I still need to coordinate with my boss I ve been out on work comp and honestly a bit overwhelmed so I haven t talked to him yet regarding a date . Would it be a full 3 months of not working ? I guess I have a few more questions for the dr as well.      Explained to patient writer would route questions to RN and ask clinic to please follow up on post-recovery questions.       "

## 2023-02-08 NOTE — TELEPHONE ENCOUNTER
Called Pt to discuss. We had a lengthy conversation about his recovery and what each of the surgical options involved. He understands it is a long recovery and now just needs to figure out timing and what is going to work best for him and his job. He will let us know when he would like to schedule or if he needs any additional information.     Delgado Loco RN

## 2023-02-10 NOTE — TELEPHONE ENCOUNTER
Patient saw Dr. Zelaya 02/07/2023 for left foot sprain.    Jaycob Taveras RN on 2/10/2023 at 7:58 AM

## 2023-02-13 ENCOUNTER — THERAPY VISIT (OUTPATIENT)
Dept: PHYSICAL THERAPY | Facility: CLINIC | Age: 33
End: 2023-02-13
Attending: PODIATRIST
Payer: OTHER MISCELLANEOUS

## 2023-02-13 DIAGNOSIS — M79.672 LEFT FOOT PAIN: Primary | ICD-10-CM

## 2023-02-13 DIAGNOSIS — M79.675 PAIN OF TOE OF LEFT FOOT: ICD-10-CM

## 2023-02-13 PROCEDURE — 97110 THERAPEUTIC EXERCISES: CPT | Mod: GP | Performed by: PHYSICAL THERAPIST

## 2023-02-13 PROCEDURE — 97112 NEUROMUSCULAR REEDUCATION: CPT | Mod: GP | Performed by: PHYSICAL THERAPIST

## 2023-02-13 PROCEDURE — 97035 APP MDLTY 1+ULTRASOUND EA 15: CPT | Mod: GP | Performed by: PHYSICAL THERAPIST

## 2023-02-14 ENCOUNTER — OFFICE VISIT (OUTPATIENT)
Dept: URGENT CARE | Facility: URGENT CARE | Age: 33
End: 2023-02-14
Payer: COMMERCIAL

## 2023-02-14 VITALS
HEART RATE: 96 BPM | OXYGEN SATURATION: 98 % | WEIGHT: 260.2 LBS | TEMPERATURE: 99.8 F | SYSTOLIC BLOOD PRESSURE: 142 MMHG | DIASTOLIC BLOOD PRESSURE: 92 MMHG | BODY MASS INDEX: 33.41 KG/M2

## 2023-02-14 DIAGNOSIS — J03.90 TONSILLITIS: Primary | ICD-10-CM

## 2023-02-14 DIAGNOSIS — R07.0 THROAT PAIN: ICD-10-CM

## 2023-02-14 LAB — DEPRECATED S PYO AG THROAT QL EIA: NEGATIVE

## 2023-02-14 PROCEDURE — 99213 OFFICE O/P EST LOW 20 MIN: CPT | Performed by: NURSE PRACTITIONER

## 2023-02-14 PROCEDURE — 87651 STREP A DNA AMP PROBE: CPT | Performed by: NURSE PRACTITIONER

## 2023-02-14 RX ORDER — LIDOCAINE HYDROCHLORIDE 20 MG/ML
15 SOLUTION OROPHARYNGEAL
Qty: 100 ML | Refills: 0 | Status: SHIPPED | OUTPATIENT
Start: 2023-02-14

## 2023-02-14 NOTE — LETTER
February 14, 2023      Tejas Villalba III  [address] [address]        To Whom It May Concern:    Tejas Grigsbycarolynnniranjan CHATTERJEE was seen on 2/14/23.  Please excuse him until symptoms improving for 24hours.        Sincerely,        BRIDGER Eugene

## 2023-02-15 LAB — GROUP A STREP BY PCR: NOT DETECTED

## 2023-02-15 NOTE — PROGRESS NOTES
"Assessment & Plan     Tonsillitis    Throat pain    - Streptococcus A Rapid Screen w/Reflex to PCR - Clinic Collect  - Group A Streptococcus PCR Throat Swab  - lidocaine, viscous, (XYLOCAINE) 2 % solution  Dispense: 100 mL; Refill: 0     Reviewed negative rapid strep results during visit, PCR testing in process, will notify if positive. COVID testing declined. Prescription sent to pharmacy for lidocaine swish and spit as needed for pain. Discussed symptoms likely viral in nature and antibiotic not indicated at this time. Recommended rest, fluids, tylenol as needed, gargle warm salt water, throat lozenges.       Follow-up with PCP if symptoms persist for 7 days, and sooner if symptoms worsen or new symptoms develop.     Discussed red flag symptoms which warrant immediate visit in emergency room    All questions were answered and patient verbalized understanding. AVS reviewed with patient.     Valorie Beasley, DNP, APRN, CNP 2/14/2023 6:27 PM  [facility] [place]          Je Romeo is a 32 year old male who presents to clinic today for the following health issues:  Chief Complaint   Patient presents with     Pharyngitis     Pt wants strep test. Sore throat for a few days, today really bad. Burning in nose and throat. Congestion, mild fever, hurts to swallow.      Patient presents for evaluation of sore throat. Associated symptoms: congestion, temp 99F, \"random coughing,\" headache. Denies emesis. No known fever, though patient reports 99F is high for him. Symptoms have been present for a few days and have been worsening today. Nothing tried for symptoms. No known exposures. He has been able to drink fluids and swallow. No immunosuppression. Pain is moderate.     He has a history of HTN.     Problem list, Medication list, Allergies, and Medical history reviewed in EPIC.    ROS:  Review of systems negative except for noted above        Objective    BP (!) 142/92   Pulse 96   Temp 99.8  F " (37.7  C) (Tympanic)   Wt 118 kg (260 lb 3.2 oz)   SpO2 98%   BMI 33.41 kg/m    Physical Exam  Constitutional:       General: He is not in acute distress.     Appearance: He is not toxic-appearing or diaphoretic.   HENT:      Head: Normocephalic and atraumatic.      Right Ear: Tympanic membrane, ear canal and external ear normal.      Left Ear: Tympanic membrane, ear canal and external ear normal.      Nose:      Right Sinus: No maxillary sinus tenderness or frontal sinus tenderness.      Left Sinus: No maxillary sinus tenderness or frontal sinus tenderness.      Comments: Mild nasal congestion     Mouth/Throat:      Mouth: Mucous membranes are moist.      Pharynx: Oropharynx is clear. Posterior oropharyngeal erythema present.      Tonsils: Tonsillar exudate present. No tonsillar abscesses. 1+ on the right. 1+ on the left.      Comments: White exudate right tonsil. Moderate oropharyngeal erythema  Eyes:      Conjunctiva/sclera: Conjunctivae normal.   Cardiovascular:      Rate and Rhythm: Normal rate and regular rhythm.      Heart sounds: Normal heart sounds.   Pulmonary:      Effort: Pulmonary effort is normal. No respiratory distress.      Breath sounds: Normal breath sounds. No wheezing, rhonchi or rales.   Lymphadenopathy:      Cervical: No cervical adenopathy.   Skin:     General: Skin is warm and dry.   Neurological:      Mental Status: He is alert.          Labs:  Results for orders placed or performed in visit on 02/14/23   Streptococcus A Rapid Screen w/Reflex to PCR - Clinic Collect     Status: Normal    Specimen: Throat; Swab   Result Value Ref Range    Group A Strep antigen Negative Negative

## 2023-02-17 ENCOUNTER — MYC REFILL (OUTPATIENT)
Dept: FAMILY MEDICINE | Facility: CLINIC | Age: 33
End: 2023-02-17

## 2023-02-17 ENCOUNTER — MYC MEDICAL ADVICE (OUTPATIENT)
Dept: FAMILY MEDICINE | Facility: CLINIC | Age: 33
End: 2023-02-17

## 2023-02-17 ENCOUNTER — TELEPHONE (OUTPATIENT)
Dept: URGENT CARE | Facility: URGENT CARE | Age: 33
End: 2023-02-17

## 2023-02-17 ENCOUNTER — E-VISIT (OUTPATIENT)
Dept: FAMILY MEDICINE | Facility: CLINIC | Age: 33
End: 2023-02-17
Payer: COMMERCIAL

## 2023-02-17 DIAGNOSIS — F90.8 ATTENTION DEFICIT HYPERACTIVITY DISORDER (ADHD), OTHER TYPE: ICD-10-CM

## 2023-02-17 DIAGNOSIS — M79.641 PAIN OF RIGHT HAND: ICD-10-CM

## 2023-02-17 PROCEDURE — 99421 OL DIG E/M SVC 5-10 MIN: CPT | Performed by: FAMILY MEDICINE

## 2023-02-17 RX ORDER — OXYCODONE HYDROCHLORIDE 5 MG/1
TABLET ORAL
Qty: 45 TABLET | Refills: 0 | Status: SHIPPED | OUTPATIENT
Start: 2023-02-17 | End: 2023-03-16

## 2023-02-17 RX ORDER — DEXTROAMPHETAMINE SACCHARATE, AMPHETAMINE ASPARTATE, DEXTROAMPHETAMINE SULFATE AND AMPHETAMINE SULFATE 3.75; 3.75; 3.75; 3.75 MG/1; MG/1; MG/1; MG/1
30 TABLET ORAL 2 TIMES DAILY
Qty: 120 TABLET | Refills: 0 | Status: SHIPPED | OUTPATIENT
Start: 2023-02-17 | End: 2023-03-16

## 2023-02-17 NOTE — LETTER
February 17, 2023      Tejas Villalba III  [address] [address] [address]        To Whom It May Concern:    Tejas Villalba III was seen on 2/14/23 for sore throat, congestion, cough, headache. Okay to return to work when symptoms are improving for 24-hours and fever-free for 24-hours        Sincerely,        BRIDGER Eugene

## 2023-02-17 NOTE — TELEPHONE ENCOUNTER
Slick, patient's boss is calling in regards to the note done at the urgent care visit on 2/14/23. They said they need more information as to why he is out of work. They want to know what symptoms he is having, and does he need to follow up, to be cleared to come back to work.    I did tell him that there was no F/U appointment coming up. I don't think we can give information about symptoms. They want a clearer note as to when he can come back to work.    Deirdre Munoz MA/RAHEEL    Or can call Tangela Valencia @ [phone]

## 2023-02-21 ENCOUNTER — THERAPY VISIT (OUTPATIENT)
Dept: PHYSICAL THERAPY | Facility: CLINIC | Age: 33
End: 2023-02-21
Payer: OTHER MISCELLANEOUS

## 2023-02-21 ENCOUNTER — TELEPHONE (OUTPATIENT)
Dept: PHYSICAL THERAPY | Facility: CLINIC | Age: 33
End: 2023-02-21
Payer: COMMERCIAL

## 2023-02-21 DIAGNOSIS — M79.672 LEFT FOOT PAIN: Primary | ICD-10-CM

## 2023-02-21 DIAGNOSIS — M79.675 PAIN OF TOE OF LEFT FOOT: ICD-10-CM

## 2023-02-21 PROCEDURE — 97110 THERAPEUTIC EXERCISES: CPT | Mod: GP | Performed by: PHYSICAL THERAPIST

## 2023-02-21 PROCEDURE — 97140 MANUAL THERAPY 1/> REGIONS: CPT | Mod: GP | Performed by: PHYSICAL THERAPIST

## 2023-02-21 PROCEDURE — 97035 APP MDLTY 1+ULTRASOUND EA 15: CPT | Mod: GP | Performed by: PHYSICAL THERAPIST

## 2023-02-28 ENCOUNTER — TELEPHONE (OUTPATIENT)
Dept: PHYSICAL THERAPY | Facility: CLINIC | Age: 33
End: 2023-02-28

## 2023-02-28 ENCOUNTER — OFFICE VISIT (OUTPATIENT)
Dept: PODIATRY | Facility: CLINIC | Age: 33
End: 2023-02-28
Payer: OTHER MISCELLANEOUS

## 2023-02-28 DIAGNOSIS — S93.602A FOOT SPRAIN, LEFT, INITIAL ENCOUNTER: Primary | ICD-10-CM

## 2023-02-28 PROCEDURE — 99213 OFFICE O/P EST LOW 20 MIN: CPT | Performed by: PODIATRIST

## 2023-02-28 NOTE — PATIENT INSTRUCTIONS
We wish you continued good healing. If you have any questions or concerns, please do not hesitate to contact us at  [phone]    [employer] (secure e-mail communication and access to your chart) to send a message or to make an appointment.    Please remember to call and schedule a follow up appointment if one was recommended at your earliest convenience.     PODIATRY CLINIC HOURS  TELEPHONE NUMBER    Dr. Nawaf SOLORZANOPRUSLAN [facility]        Clinics:  [facility]   [place]  Tuesday 1PM-6PM  [place]  Wednesday 745AM-330PM  [place]  Thursday/Friday 745AM-230PM       [facility] APPOINTMENTS  [phone]    [place] APPOINTMENTS  [phone]        If you need a medication refill, please contact us you may need lab work and/or a follow up visit prior to your refill (i.e. Antifungal medications).  If MRI needed please call Imaging at [phone]   HOW DO I GET MY KNEE SCOOTER? Knee scooters can be picked up at ANY Medical Supply stores with your knee scooter Prescription.  OR  Bring your signed prescription to an [facility] Medical Equipment showroom.

## 2023-02-28 NOTE — PROGRESS NOTES
Subjective:    12/28/22   Pt is seen today for injury to left big toe.  On December 20, 2022 patient was at work.  He fell and when he landed he hyperextended his big toe.  Had pain on the dorsum of the joint.  Pain aggravated by activity and relieved by rest.  He had swelling and bruising.  Denies erythema.  Denies increased deformity.  Denies numbness.  He works driving and unloading trucks.  He has been at home since injury.  He was given postop shoe.  Denies past history of injury to this area before.    1/19/23 patient is 4 weeks 2 days status post left hallux turf toe injury on December 20, 2022.  Patient states he is 30 to 40% better.  Still has pain dorsally.  Edema is decreasing.  Can move it slightly easier.  No other new changes.    2/7/23  Patient is 7 weeks s/p left hallux turf toeinjury on 12/20/22.   Patient states swelling has decreasing.  He has less pain.  He can move this easier.  He has gone to physical therapy and he believes this has been helpful.  He would like to continue this.  He is in the process of getting orthotics.  He has no other new complaints today.  Denies any weakness or numbness      2/28/23 patient is 10 weeks status post left hallux turf toe injury on 12/20/2022.  Patient states he continues to improve.  He states physical therapy has been helpful.  He has 2 times left.  He is back at work doing most of his normal activities.  He has no other new complaints.  He has upcoming surgery on his hand.         ROS: See above         Allergies   Allergen Reactions     Amoxicillin      Ceclor [Cefaclor]      Codeine Itching     Erythromycin      Septra [Bactrim]      Suprax [Cefixime]        Current Outpatient Medications   Medication Sig Dispense Refill     amphetamine-dextroamphetamine (ADDERALL) 15 MG tablet Take 2 tablets (30 mg) by mouth 2 times daily Temporary change in adderall until XR available 120 tablet 0     amphetamine-dextroamphetamine (ADDERALL) 20 MG tablet Take 1  tablet (20 mg) by mouth 3 times daily As needed for inattention. temporarily replaces adderall xr (Patient not taking: Reported on 1/23/2023) 90 tablet 0     amphetamine-dextroamphetamine (ADDERALL) 30 MG tablet Take 1 tablet (30 mg) by mouth 2 times daily Stop the adderall XR (Patient not taking: Reported on 1/23/2023) 60 tablet 0     calcium carbonate 1250 (500 Ca) MG CHEW Take 500 mg by mouth (Patient not taking: Reported on 2/14/2023)       cyclobenzaprine (FLEXERIL) 10 MG tablet TAKE 1/2 TO 1 TABLET(5 TO 10 MG) BY MOUTH EVERY NIGHT AS NEEDED FOR MUSCLE SPASMS 30 tablet 1     diclofenac (VOLTAREN) 50 MG EC tablet Take 1 tablet (50 mg) by mouth 2 times daily as needed for moderate pain 60 tablet 0     gabapentin (NEURONTIN) 300 MG capsule TAKE 1 CAPSULE(300 MG) BY MOUTH EVERY NIGHT AS NEEDED FOR SLEEP OR BACK PAIN (Patient not taking: Reported on 12/20/2022) 90 capsule 0     hydrOXYzine (VISTARIL) 25 MG capsule 1-2 tabs every 12 hours as needed for pain/anxiety or migraines. May cause drowsiness 60 capsule 1     hydrOXYzine (VISTARIL) 25 MG capsule TAKE 1 TO 2 CAPSULES BY MOUTH AT BEDTIME AS NEEDED FOR SLEEP OR ANXIETY (Patient not taking: Reported on 12/20/2022) 30 capsule 1     IBUPROFEN PO Take 600 mg by mouth 2 times daily As needed       lidocaine, viscous, (XYLOCAINE) 2 % solution Swish and spit 15 mLs in mouth every 3 hours as needed for pain ; Max 8 doses/24 hour period. 100 mL 0     methocarbamol (ROBAXIN) 500 MG tablet TAKE 1 TO 2 TABLETS BY MOUTH DAILY DURING THE DAY AS NEEDED FOR MUSCLE SPASMS 40 tablet 1     nicotine (NICODERM CQ) 21 MG/24HR 24 hr patch Place 1 patch onto the skin every 24 hours (Patient not taking: Reported on 10/12/2022) 21 patch 1     nicotine (NICORETTE) 2 MG gum PLACE 1 PIECE OF GUM IN CHEEK AS NEEDED FOR SMOKING CESSATION (Patient not taking: Reported on 10/12/2022) 100 each 3     omeprazole (PRILOSEC) 20 MG DR capsule Take 1 capsule (20 mg) by mouth daily Take with diclofenac  to protect stomach. 90 capsule 1     omeprazole (PRILOSEC) 40 MG DR capsule Take 1 capsule (40 mg) by mouth daily (Patient not taking: Reported on 1/23/2023) 30 capsule 0     oxyCODONE (ROXICODONE) 5 MG tablet 1.5  tab daily as needed for wrist pain. Avoid within 8 hours of klonopin or ALCOHOL ok early refill. 45 tablet 0     polyethylene glycol (MIRALAX) 17 GM/Dose powder Take 17 g (1 capful) by mouth daily (Patient not taking: Reported on 12/20/2022) 850 g 0     propranolol (INDERAL) 20 MG tablet TAKE 1 TABLET(20 MG) BY MOUTH THREE TIMES DAILY AS NEEDED FOR ANXIETY OR HIGH BLOOD PRESSURE. MAY DOUBLE DOSAGE AS NEEDED 90 tablet 3     sertraline (ZOLOFT) 100 MG tablet TAKE 1.5 TABLETS BY MOUTH FOR DEPRESSION AND ANXIETY. (Patient not taking: Reported on 10/12/2022) 135 tablet 1     SUMAtriptan (IMITREX) 25 MG tablet Take 1 tablet (25 mg) by mouth at onset of headache for migraine May repeat in 2 hours. Max 8 tablets/24 hours. 12 tablet 1     traZODone (DESYREL) 50 MG tablet TAKE 1 TABLET(50 MG) BY MOUTH EVERY NIGHT AS NEEDED FOR SLEEP. DO NOT TAKE WITH FLEXERIL 30 tablet 3       Patient Active Problem List   Diagnosis     CARDIOVASCULAR SCREENING; LDL GOAL LESS THAN 160     BMI 30.0-30.9,adult     ADD (attention deficit disorder)     Generalized anxiety disorder     Intermittent explosive disorder     Mid back pain     Intractable chronic migraine without aura and without status migrainosus     Major depressive disorder, recurrent episode, mild (H)     Chronic bilateral low back pain with right-sided sciatica     Attention deficit hyperactivity disorder (ADHD), other type     Scalp cyst     Primary hypertension     Acute appendicitis with localized peritonitis, without perforation, abscess, or gangrene     Left foot pain     Pain of toe of left foot     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  BMI: There is no height or weight on file to calculate BMI.  Height: Data Unavailable    Constitutional/ general:  Pt is in no apparent distress, appears well-nourished.  Cooperative with history and physical exam.     Psych:  The patient answered questions appropriately.  Normal affect.  Seems to have reasonable expectations, in terms of treatment.     Eyes:  Visual scanning/ tracking without deficit.     Ears:  Response to auditory stimuli is normal.  negative hearing aid devices.  Auricles in proper alignment.     Lymphatic:  Popliteal lymph nodes not enlarged.     Lungs:  Non labored breathing, non labored speech. No cough.  No audible wheezing. Even, quiet breathing.       Vascular:  positive pedal pulses bilaterally for both the DP and PT arteries.  CFT < 3 sec.  negative ankle edema.  positive pedal hair growth.    Neuro:  Alert and oriented x 3. Coordinated gait.  Light touch sensation is intact    Derm: Normal texture and turgor.  No erythema, ecchymosis, or cyanosis.      Musculoskeletal:    Lower extremity muscle strength is normal.  Patient is ambulatory without an assistive device or brace.  No gross deformities.  Normal arch.  Normal ROM all forefoot and rearfoot joints.  Left first MTPJ extensor and flexor tendons intact.  No pain on palpation of the sesamoids.  Edema on the dorsum of the first MTPJ has decreased and is minimal now.  No erythema or ecchymosis.  Pain on the dorsum of the left first MTPJ quite minimal now.  Pain with  dorsiflexion and plantarflexion but range of motion somewhat easier.    Narrative & Impression   EXAM: FOOT LEFT THREE OR MORE VIEWS  DATE/TIME: 12/20/2022 1:58 PM      INDICATION: Left foot pain after a hyperextension injury.  COMPARISON: None.                                                                      IMPRESSION: Normal joint spacing and alignment.  No fracture.         Assessment: Left first MTPJ turf toe injury    Plan:  Patient improving.  We discussed with patient that activity and movement will be beneficial as long as he does not reinjure this.  He has orthotics.  We will continue use these and break these in.  Continue carbon plate to prevent reinjury.  He has 2 more physical therapy sessions and he will continue these.  He is doing much better and he will RTC as needed.    Nawaf Zelaya DPM, FACFAS

## 2023-02-28 NOTE — LETTER
2/28/2023         RE: Tejas Villlaba III  [address] [facility] [address]        Dear Colleague,    Thank you for referring your patient, Tejas Villalba III, to the [facility]. Please see a copy of my visit note below.     Subjective:    12/28/22   Pt is seen today for injury to left big toe.  On December 20, 2022 patient was at work.  He fell and when he landed he hyperextended his big toe.  Had pain on the dorsum of the joint.  Pain aggravated by activity and relieved by rest.  He had swelling and bruising.  Denies erythema.  Denies increased deformity.  Denies numbness.  He works driving and unloading trucks.  He has been at home since injury.  He was given postop shoe.  Denies past history of injury to this area before.    1/19/23 patient is 4 weeks 2 days status post left hallux turf toe injury on December 20, 2022.  Patient states he is 30 to 40% better.  Still has pain dorsally.  Edema is decreasing.  Can move it slightly easier.  No other new changes.    2/7/23  Patient is 7 weeks s/p left hallux turf toeinjury on 12/20/22.   Patient states swelling has decreasing.  He has less pain.  He can move this easier.  He has gone to physical therapy and he believes this has been helpful.  He would like to continue this.  He is in the process of getting orthotics.  He has no other new complaints today.  Denies any weakness or numbness      2/28/23 patient is 10 weeks status post left hallux turf toe injury on 12/20/2022.  Patient states he continues to improve.  He states physical therapy has been helpful.  He has 2 times left.  He is back at work doing most of his normal activities.  He has no other new complaints.  He has upcoming surgery on his hand.         ROS: See above         Allergies   Allergen Reactions     Amoxicillin      Ceclor [Cefaclor]      Codeine Itching     Erythromycin      Septra [Bactrim]      Suprax [Cefixime]        Current Outpatient  Medications   Medication Sig Dispense Refill     amphetamine-dextroamphetamine (ADDERALL) 15 MG tablet Take 2 tablets (30 mg) by mouth 2 times daily Temporary change in adderall until XR available 120 tablet 0     amphetamine-dextroamphetamine (ADDERALL) 20 MG tablet Take 1 tablet (20 mg) by mouth 3 times daily As needed for inattention. temporarily replaces adderall xr (Patient not taking: Reported on 1/23/2023) 90 tablet 0     amphetamine-dextroamphetamine (ADDERALL) 30 MG tablet Take 1 tablet (30 mg) by mouth 2 times daily Stop the adderall XR (Patient not taking: Reported on 1/23/2023) 60 tablet 0     calcium carbonate 1250 (500 Ca) MG CHEW Take 500 mg by mouth (Patient not taking: Reported on 2/14/2023)       cyclobenzaprine (FLEXERIL) 10 MG tablet TAKE 1/2 TO 1 TABLET(5 TO 10 MG) BY MOUTH EVERY NIGHT AS NEEDED FOR MUSCLE SPASMS 30 tablet 1     diclofenac (VOLTAREN) 50 MG EC tablet Take 1 tablet (50 mg) by mouth 2 times daily as needed for moderate pain 60 tablet 0     gabapentin (NEURONTIN) 300 MG capsule TAKE 1 CAPSULE(300 MG) BY MOUTH EVERY NIGHT AS NEEDED FOR SLEEP OR BACK PAIN (Patient not taking: Reported on 12/20/2022) 90 capsule 0     hydrOXYzine (VISTARIL) 25 MG capsule 1-2 tabs every 12 hours as needed for pain/anxiety or migraines. May cause drowsiness 60 capsule 1     hydrOXYzine (VISTARIL) 25 MG capsule TAKE 1 TO 2 CAPSULES BY MOUTH AT BEDTIME AS NEEDED FOR SLEEP OR ANXIETY (Patient not taking: Reported on 12/20/2022) 30 capsule 1     IBUPROFEN PO Take 600 mg by mouth 2 times daily As needed       lidocaine, viscous, (XYLOCAINE) 2 % solution Swish and spit 15 mLs in mouth every 3 hours as needed for pain ; Max 8 doses/24 hour period. 100 mL 0     methocarbamol (ROBAXIN) 500 MG tablet TAKE 1 TO 2 TABLETS BY MOUTH DAILY DURING THE DAY AS NEEDED FOR MUSCLE SPASMS 40 tablet 1     nicotine (NICODERM CQ) 21 MG/24HR 24 hr patch Place 1 patch onto the skin every 24 hours (Patient not taking: Reported on  10/12/2022) 21 patch 1     nicotine (NICORETTE) 2 MG gum PLACE 1 PIECE OF GUM IN CHEEK AS NEEDED FOR SMOKING CESSATION (Patient not taking: Reported on 10/12/2022) 100 each 3     omeprazole (PRILOSEC) 20 MG DR capsule Take 1 capsule (20 mg) by mouth daily Take with diclofenac to protect stomach. 90 capsule 1     omeprazole (PRILOSEC) 40 MG DR capsule Take 1 capsule (40 mg) by mouth daily (Patient not taking: Reported on 1/23/2023) 30 capsule 0     oxyCODONE (ROXICODONE) 5 MG tablet 1.5  tab daily as needed for wrist pain. Avoid within 8 hours of klonopin or ALCOHOL ok early refill. 45 tablet 0     polyethylene glycol (MIRALAX) 17 GM/Dose powder Take 17 g (1 capful) by mouth daily (Patient not taking: Reported on 12/20/2022) 850 g 0     propranolol (INDERAL) 20 MG tablet TAKE 1 TABLET(20 MG) BY MOUTH THREE TIMES DAILY AS NEEDED FOR ANXIETY OR HIGH BLOOD PRESSURE. MAY DOUBLE DOSAGE AS NEEDED 90 tablet 3     sertraline (ZOLOFT) 100 MG tablet TAKE 1.5 TABLETS BY MOUTH FOR DEPRESSION AND ANXIETY. (Patient not taking: Reported on 10/12/2022) 135 tablet 1     SUMAtriptan (IMITREX) 25 MG tablet Take 1 tablet (25 mg) by mouth at onset of headache for migraine May repeat in 2 hours. Max 8 tablets/24 hours. 12 tablet 1     traZODone (DESYREL) 50 MG tablet TAKE 1 TABLET(50 MG) BY MOUTH EVERY NIGHT AS NEEDED FOR SLEEP. DO NOT TAKE WITH FLEXERIL 30 tablet 3       Patient Active Problem List   Diagnosis     CARDIOVASCULAR SCREENING; LDL GOAL LESS THAN 160     BMI 30.0-30.9,adult     ADD (attention deficit disorder)     Generalized anxiety disorder     Intermittent explosive disorder     Mid back pain     Intractable chronic migraine without aura and without status migrainosus     Major depressive disorder, recurrent episode, mild (H)     Chronic bilateral low back pain with right-sided sciatica     Attention deficit hyperactivity disorder (ADHD), other type     Scalp cyst     Primary hypertension     Acute appendicitis with  localized peritonitis, without perforation, abscess, or gangrene     Left foot pain     Pain of toe of left foot     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  BMI: There is no height or weight on file to calculate BMI.  Height: Data Unavailable    Constitutional/ general:  Pt is in no apparent distress, appears well-nourished.  Cooperative with history and physical exam.     Psych:  The patient answered questions appropriately.  Normal affect.  Seems to have reasonable expectations, in terms of treatment.     Eyes:  Visual scanning/ tracking without deficit.     Ears:  Response to auditory stimuli is normal.  negative hearing aid devices.  Auricles in proper alignment.     Lymphatic:  Popliteal lymph nodes not enlarged.     Lungs:  Non labored breathing, non labored speech. No cough.  No audible wheezing. Even, quiet breathing.       Vascular:  positive pedal pulses bilaterally for both the DP and PT arteries.  CFT < 3 sec.  negative ankle edema.  positive pedal hair growth.    Neuro:  Alert and oriented x 3. Coordinated gait.  Light touch sensation is intact    Derm: Normal texture and turgor.  No erythema, ecchymosis, or cyanosis.      Musculoskeletal:    Lower extremity muscle strength is normal.  Patient is ambulatory without an assistive device or brace.  No gross deformities.  Normal arch.  Normal ROM all forefoot and  rearfoot joints.  Left first MTPJ extensor and flexor tendons intact.  No pain on palpation of the sesamoids.  Edema on the dorsum of the first MTPJ has decreased and is minimal now.  No erythema or ecchymosis.  Pain on the dorsum of the left first MTPJ quite minimal now.  Pain with dorsiflexion and plantarflexion but range of motion somewhat easier.    Narrative & Impression   EXAM: FOOT LEFT THREE OR MORE VIEWS  DATE/TIME: 12/20/2022 1:58 PM      INDICATION: Left foot pain after a hyperextension injury.  COMPARISON: None.                                                                      IMPRESSION: Normal joint spacing and alignment.  No fracture.         Assessment: Left first MTPJ turf toe injury    Plan:  Patient improving.  We discussed with patient that activity and movement will be beneficial as long as he does not reinjure this.  He has orthotics.  We will continue use these and break these in.  Continue carbon plate to prevent reinjury.  He has 2 more physical therapy sessions and he will continue these.  He is doing much better and he will RTC as needed.    Nawaf Zelaya DPM, FACFAS           Again, thank you for allowing me to participate in the care of your patient.        Sincerely,        Nawaf Zelaya DPM

## 2023-02-28 NOTE — TELEPHONE ENCOUNTER
Called and spoke with Rosy, Nurse .  Pt to see MD today.  Overall progressing with HEP, pain improving.  To continue per POC     Rosy Alonso   [phone]

## 2023-02-28 NOTE — LETTER
[facility]  [address]  [place] [address]  Phone: [phone]    February 28, 2023        Tejas Villalba III  [address] [address] [address]          To whom it may concern:    RE: Tejas Villalba III    Patient was seen and treated today at our clinic.  Patient may return to work 3/1/23 with the following:  No working or lifting restrictions  When the patient returns to work, the following apply:Must finish last two Physical Therapy  apoointments      Please contact me for questions or concerns.      Sincerely,        Dr. Nawaf SOLORZANOPRUSLAN FAC FAS/lld    (Electronically Signed)

## 2023-03-02 ENCOUNTER — THERAPY VISIT (OUTPATIENT)
Dept: PHYSICAL THERAPY | Facility: CLINIC | Age: 33
End: 2023-03-02
Payer: OTHER MISCELLANEOUS

## 2023-03-02 DIAGNOSIS — M79.672 LEFT FOOT PAIN: Primary | ICD-10-CM

## 2023-03-02 DIAGNOSIS — M79.675 PAIN OF TOE OF LEFT FOOT: ICD-10-CM

## 2023-03-02 PROCEDURE — 97035 APP MDLTY 1+ULTRASOUND EA 15: CPT | Mod: GP | Performed by: PHYSICAL THERAPIST

## 2023-03-02 PROCEDURE — 97110 THERAPEUTIC EXERCISES: CPT | Mod: GP | Performed by: PHYSICAL THERAPIST

## 2023-03-02 PROCEDURE — 97140 MANUAL THERAPY 1/> REGIONS: CPT | Mod: GP | Performed by: PHYSICAL THERAPIST

## 2023-03-09 ENCOUNTER — THERAPY VISIT (OUTPATIENT)
Dept: PHYSICAL THERAPY | Facility: CLINIC | Age: 33
End: 2023-03-09
Payer: OTHER MISCELLANEOUS

## 2023-03-09 DIAGNOSIS — M79.675 PAIN OF TOE OF LEFT FOOT: ICD-10-CM

## 2023-03-09 DIAGNOSIS — M79.672 LEFT FOOT PAIN: Primary | ICD-10-CM

## 2023-03-09 PROCEDURE — 97035 APP MDLTY 1+ULTRASOUND EA 15: CPT | Mod: GP | Performed by: PHYSICAL THERAPIST

## 2023-03-09 PROCEDURE — 97112 NEUROMUSCULAR REEDUCATION: CPT | Mod: GP | Performed by: PHYSICAL THERAPIST

## 2023-03-09 PROCEDURE — 97140 MANUAL THERAPY 1/> REGIONS: CPT | Mod: GP | Performed by: PHYSICAL THERAPIST

## 2023-03-16 ENCOUNTER — MYC REFILL (OUTPATIENT)
Dept: FAMILY MEDICINE | Facility: CLINIC | Age: 33
End: 2023-03-16
Payer: COMMERCIAL

## 2023-03-16 DIAGNOSIS — M79.641 PAIN OF RIGHT HAND: ICD-10-CM

## 2023-03-16 DIAGNOSIS — F90.8 ATTENTION DEFICIT HYPERACTIVITY DISORDER (ADHD), OTHER TYPE: ICD-10-CM

## 2023-03-16 RX ORDER — OXYCODONE HYDROCHLORIDE 5 MG/1
TABLET ORAL
Qty: 45 TABLET | Refills: 0 | Status: SHIPPED | OUTPATIENT
Start: 2023-03-18 | End: 2023-04-13

## 2023-03-16 RX ORDER — DEXTROAMPHETAMINE SACCHARATE, AMPHETAMINE ASPARTATE, DEXTROAMPHETAMINE SULFATE AND AMPHETAMINE SULFATE 3.75; 3.75; 3.75; 3.75 MG/1; MG/1; MG/1; MG/1
30 TABLET ORAL 2 TIMES DAILY
Qty: 120 TABLET | Refills: 0 | Status: SHIPPED | OUTPATIENT
Start: 2023-03-22 | End: 2023-04-13

## 2023-03-21 ENCOUNTER — TELEPHONE (OUTPATIENT)
Dept: ORTHOPEDICS | Facility: CLINIC | Age: 33
End: 2023-03-21
Payer: COMMERCIAL

## 2023-03-21 RX ORDER — CLINDAMYCIN PHOSPHATE 900 MG/50ML
900 INJECTION, SOLUTION INTRAVENOUS SEE ADMIN INSTRUCTIONS
Status: CANCELLED | OUTPATIENT
Start: 2023-03-21

## 2023-03-21 RX ORDER — CLINDAMYCIN PHOSPHATE 900 MG/50ML
900 INJECTION, SOLUTION INTRAVENOUS
Status: CANCELLED | OUTPATIENT
Start: 2023-03-21

## 2023-03-21 NOTE — TELEPHONE ENCOUNTER
Called patient and left a voicemail explaining a pre-op physical is needed prior to surgery. Explained pre-op nurses have also reached out. Explained to patient if we do not hear back by 1 PM on 3/22 we will cancel surgery. Provided direct call back number to writer. [phone].

## 2023-03-22 ENCOUNTER — ANESTHESIA EVENT (OUTPATIENT)
Dept: SURGERY | Facility: AMBULATORY SURGERY CENTER | Age: 33
End: 2023-03-22
Payer: COMMERCIAL

## 2023-03-22 RX ORDER — NALOXONE HYDROCHLORIDE 0.4 MG/ML
0.2 INJECTION, SOLUTION INTRAMUSCULAR; INTRAVENOUS; SUBCUTANEOUS
Status: CANCELLED | OUTPATIENT
Start: 2023-03-22

## 2023-03-22 RX ORDER — NALOXONE HYDROCHLORIDE 0.4 MG/ML
0.4 INJECTION, SOLUTION INTRAMUSCULAR; INTRAVENOUS; SUBCUTANEOUS
Status: CANCELLED | OUTPATIENT
Start: 2023-03-22

## 2023-03-22 RX ORDER — ONDANSETRON 4 MG/1
4 TABLET, ORALLY DISINTEGRATING ORAL EVERY 8 HOURS PRN
Qty: 12 TABLET | Refills: 0 | OUTPATIENT
Start: 2023-03-22 | End: 2024-08-08

## 2023-03-22 RX ORDER — DOXYCYCLINE 100 MG/1
100 CAPSULE ORAL 2 TIMES DAILY
Qty: 3 CAPSULE | Refills: 0 | OUTPATIENT
Start: 2023-03-22 | End: 2024-08-08

## 2023-03-22 RX ORDER — OXYCODONE HYDROCHLORIDE 5 MG/1
10 TABLET ORAL
Status: CANCELLED | OUTPATIENT
Start: 2023-03-22

## 2023-03-22 RX ORDER — OXYCODONE HYDROCHLORIDE 5 MG/1
5 TABLET ORAL
Status: CANCELLED | OUTPATIENT
Start: 2023-03-22

## 2023-03-22 RX ORDER — FLUMAZENIL 0.1 MG/ML
0.2 INJECTION, SOLUTION INTRAVENOUS
Status: CANCELLED | OUTPATIENT
Start: 2023-03-22

## 2023-03-22 RX ORDER — FENTANYL CITRATE 50 UG/ML
25-50 INJECTION, SOLUTION INTRAMUSCULAR; INTRAVENOUS
Status: CANCELLED | OUTPATIENT
Start: 2023-03-22

## 2023-03-22 RX ORDER — OXYCODONE HYDROCHLORIDE 5 MG/1
5 TABLET ORAL EVERY 6 HOURS PRN
Qty: 6 TABLET | Refills: 0 | OUTPATIENT
Start: 2023-03-22 | End: 2024-08-08

## 2023-03-23 ENCOUNTER — TELEPHONE (OUTPATIENT)
Dept: ORTHOPEDICS | Facility: CLINIC | Age: 33
End: 2023-03-23

## 2023-03-23 ENCOUNTER — ANESTHESIA (OUTPATIENT)
Dept: SURGERY | Facility: AMBULATORY SURGERY CENTER | Age: 33
End: 2023-03-23
Payer: COMMERCIAL

## 2023-03-23 NOTE — ANESTHESIA PREPROCEDURE EVALUATION
Anesthesia Pre-Procedure Evaluation    Patient: Tejas Villalba III   MRN: [id number] : 1990        Procedure : Procedure(s):  RIGHT SCAPHOID NONUNION DEBRIDEMENT, POSSIBLE BONE GRAFTING FROM THE IPSILATERAL DISTAL RADIUS, POSSIBLE OPEN REDUCTION WITH INTERNAL FIXATION USING CANNULATED COMPRESSION SCREW, POSSIBLE SCAPHOIDECTOMY AND FOUR-BONE ARTHRODESIS          Past Medical History:   Diagnosis Date     ADHD      HTN       Past Surgical History:   Procedure Laterality Date     PE TUBES        Allergies   Allergen Reactions     Amoxicillin      Ceclor [Cefaclor]      Codeine Itching     Erythromycin      Septra [Bactrim]      Suprax [Cefixime]       Social History     Tobacco Use     Smoking status: Former     Packs/day: 0.50     Years: 10.00     Pack years: 5.00     Types: Cigarettes     Quit date: 2010     Years since quittin.6     Smokeless tobacco: Never   Substance Use Topics     Alcohol use: Yes     Comment: OCC      Wt Readings from Last 1 Encounters:   23 118 kg (260 lb 3.2 oz)        Anesthesia Evaluation            ROS/MED HX  ENT/Pulmonary:  - neg pulmonary ROS     Neurologic:  - neg neurologic ROS     Cardiovascular:     (+) hypertension-----    METS/Exercise Tolerance:     Hematologic:  - neg hematologic  ROS     Musculoskeletal:  - neg musculoskeletal ROS     GI/Hepatic:  - neg GI/hepatic ROS     Renal/Genitourinary:  - neg Renal ROS     Endo:  - neg endo ROS     Psychiatric/Substance Use:  - neg psychiatric ROS     Infectious Disease:  - neg infectious disease ROS     Malignancy:       Other:               OUTSIDE LABS:  CBC:   Lab Results   Component Value Date    WBC 7.0 2022    WBC 8.4 2011    HGB 15.6 2022    HGB 16.3 2011    HCT 46.3 2022    HCT 46.7 2011     2022     2011     BMP:   Lab Results   Component Value Date     2011     2010    POTASSIUM 4.2 2011    POTASSIUM 4.3  06/23/2010    CHLORIDE 100 07/20/2011    CHLORIDE 103 06/23/2010    CO2 29 07/20/2011    CO2 29 06/23/2010    BUN 15 07/20/2011    BUN 17 06/23/2010    CR 1.04 07/20/2011    CR 0.91 06/23/2010    GLC 81 07/20/2011    GLC 77 06/23/2010     COAGS: No results found for: PTT, INR, FIBR  POC: No results found for: BGM, HCG, HCGS  HEPATIC:   Lab Results   Component Value Date    ALBUMIN 4.8 07/20/2011    PROTTOTAL 7.8 07/20/2011    ALT 43 07/20/2011    AST 38 07/20/2011    ALKPHOS 45 07/20/2011    BILITOTAL 0.8 07/20/2011     OTHER:   Lab Results   Component Value Date    IMTIAZ 9.6 07/20/2011    TSH 0.32 (L) 05/19/2022    T4 0.79 05/19/2022       Anesthesia Plan    ASA Status:  2      Anesthesia Type: Peripheral Nerve Block.              Consents    Anesthesia Plan(s) and associated risks, benefits, and realistic alternatives discussed. Questions answered and patient/representative(s) expressed understanding.     - Discussed: Risks, Benefits and Alternatives for BOTH SEDATION and the PROCEDURE were discussed     - Discussed with:  Patient      - Extended Intubation/Ventilatory Support Discussed: No.      - Patient is DNR/DNI Status: No    Use of blood products discussed: No .     Postoperative Care    Pain management: IV analgesics.   PONV prophylaxis: Ondansetron (or other 5HT-3), Dexamethasone or Solumedrol     Comments:                Anoop Carbone MD, MD

## 2023-03-23 NOTE — TELEPHONE ENCOUNTER
Patient called in to reschedule surgery to 5/9. Patient is wondering if he will need a repeat MRI before surgery. Will route to clinic to ask.    Post-op and pre-op physical rescheduled. Will also send new surgery packet.

## 2023-03-28 ENCOUNTER — E-VISIT (OUTPATIENT)
Dept: FAMILY MEDICINE | Facility: CLINIC | Age: 33
End: 2023-03-28
Payer: COMMERCIAL

## 2023-03-28 DIAGNOSIS — N52.9 ERECTILE DYSFUNCTION, UNSPECIFIED ERECTILE DYSFUNCTION TYPE: Primary | ICD-10-CM

## 2023-03-28 PROCEDURE — 99421 OL DIG E/M SVC 5-10 MIN: CPT | Performed by: FAMILY MEDICINE

## 2023-03-28 RX ORDER — SILDENAFIL CITRATE 20 MG/1
TABLET ORAL
Qty: 30 TABLET | Refills: 1 | Status: SHIPPED | OUTPATIENT
Start: 2023-03-28

## 2023-04-13 ENCOUNTER — MYC REFILL (OUTPATIENT)
Dept: FAMILY MEDICINE | Facility: CLINIC | Age: 33
End: 2023-04-13
Payer: COMMERCIAL

## 2023-04-13 DIAGNOSIS — F90.8 ATTENTION DEFICIT HYPERACTIVITY DISORDER (ADHD), OTHER TYPE: ICD-10-CM

## 2023-04-13 DIAGNOSIS — M79.641 PAIN OF RIGHT HAND: ICD-10-CM

## 2023-04-14 ENCOUNTER — MYC MEDICAL ADVICE (OUTPATIENT)
Dept: FAMILY MEDICINE | Facility: CLINIC | Age: 33
End: 2023-04-14
Payer: COMMERCIAL

## 2023-04-14 DIAGNOSIS — M79.641 PAIN OF RIGHT HAND: ICD-10-CM

## 2023-04-14 RX ORDER — OXYCODONE HYDROCHLORIDE 5 MG/1
TABLET ORAL
Qty: 45 TABLET | Refills: 0 | Status: SHIPPED | OUTPATIENT
Start: 2023-04-22 | End: 2023-04-16

## 2023-04-14 RX ORDER — DEXTROAMPHETAMINE SACCHARATE, AMPHETAMINE ASPARTATE, DEXTROAMPHETAMINE SULFATE AND AMPHETAMINE SULFATE 3.75; 3.75; 3.75; 3.75 MG/1; MG/1; MG/1; MG/1
30 TABLET ORAL 2 TIMES DAILY
Qty: 120 TABLET | Refills: 0 | Status: SHIPPED | OUTPATIENT
Start: 2023-04-22 | End: 2023-05-16

## 2023-04-16 RX ORDER — OXYCODONE HYDROCHLORIDE 5 MG/1
TABLET ORAL
Qty: 45 TABLET | Refills: 0 | Status: SHIPPED | OUTPATIENT
Start: 2023-04-18 | End: 2023-05-16

## 2023-04-17 ENCOUNTER — MYC MEDICAL ADVICE (OUTPATIENT)
Dept: FAMILY MEDICINE | Facility: CLINIC | Age: 33
End: 2023-04-17
Payer: COMMERCIAL

## 2023-04-18 ENCOUNTER — NURSE TRIAGE (OUTPATIENT)
Dept: NURSING | Facility: CLINIC | Age: 33
End: 2023-04-18

## 2023-04-18 ENCOUNTER — MYC MEDICAL ADVICE (OUTPATIENT)
Dept: FAMILY MEDICINE | Facility: CLINIC | Age: 33
End: 2023-04-18

## 2023-04-18 NOTE — TELEPHONE ENCOUNTER
Provider, see nurse triage encounter from today. Pharmacy will be filling the oxycodone today so nothing further needs to be done.   Patient asking if he should request a wrist injection from you or his wrist surgeon? Having wrist surgery 5/9/23.    Tangela MAYON, RN

## 2023-04-18 NOTE — TELEPHONE ENCOUNTER
"Nurse Triage SBAR    Is this a 2nd Level Triage? NO  Communication from Brooks Noyola    Situation:  Caller is Brooks Noyola \"Pov\".  Simply wishes to convey to PCP that oxycodone date for dispensing could have been April 17th (instead of 18th).  States \"Dr Waters probably was thinking of the pt's Adderall when inserting dispensing date on the oxycodone.\"  Oxycodone is being dispensed today (Apr 18th) as noted on electronic transmittal, but pt would have been depleted already a day or two ago per PharmD.    PharmD simply wishes to advise for next month's refill (May), pt may indeed  by May 17th.  Now routing this message as an 'fyi' for PCP for next month's oxycodone refill.  (No current action required.)    Thank you-    Sunita [facility] Nurse Advisor     Reason for Disposition    Pharmacy calling with prescription question and triager answers question    Protocols used: MEDICATION QUESTION CALL-A-OH      "

## 2023-04-23 ENCOUNTER — HEALTH MAINTENANCE LETTER (OUTPATIENT)
Age: 33
End: 2023-04-23

## 2023-05-03 ENCOUNTER — ANESTHESIA EVENT (OUTPATIENT)
Dept: SURGERY | Facility: AMBULATORY SURGERY CENTER | Age: 33
End: 2023-05-03

## 2023-05-03 RX ORDER — FENTANYL CITRATE 50 UG/ML
25 INJECTION, SOLUTION INTRAMUSCULAR; INTRAVENOUS EVERY 5 MIN PRN
Status: CANCELLED | OUTPATIENT
Start: 2023-05-03

## 2023-05-03 RX ORDER — SODIUM CHLORIDE, SODIUM LACTATE, POTASSIUM CHLORIDE, CALCIUM CHLORIDE 600; 310; 30; 20 MG/100ML; MG/100ML; MG/100ML; MG/100ML
INJECTION, SOLUTION INTRAVENOUS CONTINUOUS
Status: CANCELLED | OUTPATIENT
Start: 2023-05-03

## 2023-05-03 RX ORDER — NALOXONE HYDROCHLORIDE 0.4 MG/ML
0.4 INJECTION, SOLUTION INTRAMUSCULAR; INTRAVENOUS; SUBCUTANEOUS
Status: CANCELLED | OUTPATIENT
Start: 2023-05-03

## 2023-05-03 RX ORDER — HYDROMORPHONE HYDROCHLORIDE 1 MG/ML
0.2 INJECTION, SOLUTION INTRAMUSCULAR; INTRAVENOUS; SUBCUTANEOUS EVERY 5 MIN PRN
Status: CANCELLED | OUTPATIENT
Start: 2023-05-03

## 2023-05-03 RX ORDER — ONDANSETRON 2 MG/ML
4 INJECTION INTRAMUSCULAR; INTRAVENOUS EVERY 30 MIN PRN
Status: CANCELLED | OUTPATIENT
Start: 2023-05-03

## 2023-05-03 RX ORDER — FENTANYL CITRATE 50 UG/ML
50 INJECTION, SOLUTION INTRAMUSCULAR; INTRAVENOUS EVERY 5 MIN PRN
Status: CANCELLED | OUTPATIENT
Start: 2023-05-03

## 2023-05-03 RX ORDER — ACETAMINOPHEN 325 MG/1
975 TABLET ORAL ONCE
Status: CANCELLED | OUTPATIENT
Start: 2023-05-03 | End: 2023-05-03

## 2023-05-03 RX ORDER — LABETALOL HYDROCHLORIDE 5 MG/ML
10 INJECTION, SOLUTION INTRAVENOUS
Status: CANCELLED | OUTPATIENT
Start: 2023-05-03

## 2023-05-03 RX ORDER — FENTANYL CITRATE 50 UG/ML
25-50 INJECTION, SOLUTION INTRAMUSCULAR; INTRAVENOUS
Status: CANCELLED | OUTPATIENT
Start: 2023-05-03

## 2023-05-03 RX ORDER — OXYCODONE HYDROCHLORIDE 5 MG/1
10 TABLET ORAL
Status: CANCELLED | OUTPATIENT
Start: 2023-05-03

## 2023-05-03 RX ORDER — HYDROMORPHONE HYDROCHLORIDE 1 MG/ML
0.4 INJECTION, SOLUTION INTRAMUSCULAR; INTRAVENOUS; SUBCUTANEOUS EVERY 5 MIN PRN
Status: CANCELLED | OUTPATIENT
Start: 2023-05-03

## 2023-05-03 RX ORDER — NALOXONE HYDROCHLORIDE 0.4 MG/ML
0.2 INJECTION, SOLUTION INTRAMUSCULAR; INTRAVENOUS; SUBCUTANEOUS
Status: CANCELLED | OUTPATIENT
Start: 2023-05-03

## 2023-05-03 RX ORDER — ONDANSETRON 4 MG/1
4 TABLET, ORALLY DISINTEGRATING ORAL EVERY 30 MIN PRN
Status: CANCELLED | OUTPATIENT
Start: 2023-05-03

## 2023-05-03 RX ORDER — LIDOCAINE 40 MG/G
CREAM TOPICAL
Status: CANCELLED | OUTPATIENT
Start: 2023-05-03

## 2023-05-03 RX ORDER — OXYCODONE HYDROCHLORIDE 5 MG/1
5 TABLET ORAL
Status: CANCELLED | OUTPATIENT
Start: 2023-05-03

## 2023-05-03 RX ORDER — FLUMAZENIL 0.1 MG/ML
0.2 INJECTION, SOLUTION INTRAVENOUS
Status: CANCELLED | OUTPATIENT
Start: 2023-05-03

## 2023-05-08 PROBLEM — M79.672 LEFT FOOT PAIN: Status: RESOLVED | Noted: 2023-01-27 | Resolved: 2023-05-08

## 2023-05-08 PROBLEM — M79.675 PAIN OF TOE OF LEFT FOOT: Status: RESOLVED | Noted: 2023-01-27 | Resolved: 2023-05-08

## 2023-05-09 ENCOUNTER — ANESTHESIA (OUTPATIENT)
Dept: SURGERY | Facility: AMBULATORY SURGERY CENTER | Age: 33
End: 2023-05-09

## 2023-05-16 ENCOUNTER — MYC REFILL (OUTPATIENT)
Dept: FAMILY MEDICINE | Facility: CLINIC | Age: 33
End: 2023-05-16
Payer: COMMERCIAL

## 2023-05-16 DIAGNOSIS — M79.641 PAIN OF RIGHT HAND: ICD-10-CM

## 2023-05-16 DIAGNOSIS — F90.8 ATTENTION DEFICIT HYPERACTIVITY DISORDER (ADHD), OTHER TYPE: ICD-10-CM

## 2023-05-16 RX ORDER — DEXTROAMPHETAMINE SACCHARATE, AMPHETAMINE ASPARTATE, DEXTROAMPHETAMINE SULFATE AND AMPHETAMINE SULFATE 3.75; 3.75; 3.75; 3.75 MG/1; MG/1; MG/1; MG/1
30 TABLET ORAL 2 TIMES DAILY
Qty: 120 TABLET | Refills: 0 | Status: SHIPPED | OUTPATIENT
Start: 2023-05-18 | End: 2023-06-16

## 2023-05-16 RX ORDER — OXYCODONE HYDROCHLORIDE 5 MG/1
TABLET ORAL
Qty: 45 TABLET | Refills: 0 | Status: SHIPPED | OUTPATIENT
Start: 2023-05-18 | End: 2023-05-19

## 2023-05-18 ENCOUNTER — HOSPITAL ENCOUNTER (OUTPATIENT)
Facility: AMBULATORY SURGERY CENTER | Age: 33
Discharge: HOME | End: 2023-05-18
Attending: STUDENT IN AN ORGANIZED HEALTH CARE EDUCATION/TRAINING PROGRAM
Payer: COMMERCIAL

## 2023-05-18 DIAGNOSIS — S62.021K: ICD-10-CM

## 2023-05-19 ENCOUNTER — VIRTUAL VISIT (OUTPATIENT)
Dept: FAMILY MEDICINE | Facility: CLINIC | Age: 33
End: 2023-05-19
Payer: COMMERCIAL

## 2023-05-19 DIAGNOSIS — M79.641 PAIN OF RIGHT HAND: ICD-10-CM

## 2023-05-19 DIAGNOSIS — F90.8 ATTENTION DEFICIT HYPERACTIVITY DISORDER (ADHD), OTHER TYPE: ICD-10-CM

## 2023-05-19 PROCEDURE — 99214 OFFICE O/P EST MOD 30 MIN: CPT | Mod: VID | Performed by: FAMILY MEDICINE

## 2023-05-19 RX ORDER — OXYCODONE HYDROCHLORIDE 5 MG/1
TABLET ORAL
Qty: 60 TABLET | Refills: 0 | Status: SHIPPED | OUTPATIENT
Start: 2023-06-10 | End: 2023-07-07

## 2023-05-19 NOTE — PROGRESS NOTES
Ousmane is a 32 year old who is being evaluated via a billable video visit.      How would you like to obtain your AVS? MyChart  If the video visit is dropped, the invitation should be resent by: Text to cell phone: [phone]  Will anyone else be joining your video visit? No      ASSESSMENT / PLAN:  (E56.633) Pain of right hand  Comment: needs help  Plan: oxyCODONE (ROXICODONE) 5 MG tablet        Temporary increase to 2x/day.  Needs surgery. Reveiwed risks and side effects of medication  Avoid with benzos/ ALCOHOL. Will need pain specialist if not surgery    (F90.8) Attention deficit hyperactivity disorder (ADHD), other type  Comment: stable  Plan: adderall. Reveiwed risks and side effects of medication  Recheck in 4 months-sooner if worse. If SUICIAL IDEATION OR HOMOCIDAL IDEATION OR BERNADETTE TO ER           Subjective   Ousmane is a 32 year old, presenting for the following health issues:  Wrist Pain  Follow-up adhd, anxiety, migraines, tobacco abuse, insomnia and chronic pain issues.  Had close transerve fracture scaphoid right wrist - working on getting surgery scheduled. New boss - working and difficult to get time off.   Patient quit smoking.   Seeing son on Saturday regularly. Mediation upcoming. [facility] a dosage.  Sleep overall ok - hit/miss with wrist issues.         5/19/2023    10:39 AM   Additional Questions   Roomed by Margo     History of Present Illness       Reason for visit:  Right Hand Wrist Pain    He eats 2-3 servings of fruits and vegetables daily.He consumes 3 sweetened beverage(s) daily.He exercises with enough effort to increase his heart rate 30 to 60 minutes per day.  He exercises with enough effort to increase his heart rate 5 days per week.   He is taking medications regularly.       Concern - Right Wrist Pain   Onset: Awhile   Description: Wrist Pain, Sickening pain   Intensity: moderate  Progression of Symptoms:  worsening  Accompanying Signs & Symptoms:   Previous history of similar problem:  Pain goes into palm and fingers, and up arm   Precipitating factors:        Worsened by: None  Alleviating factors:        Improved by: Medication  Therapies tried and outcome: Tylenol, Ibuprofen           Objective           Vitals:  No vitals were obtained today due to virtual visit.    Physical Exam   GENERAL: Healthy, alert and no distress  EYES: Eyes grossly normal to inspection.  No discharge or erythema, or obvious scleral/conjunctival abnormalities.  NEURO: Cranial nerves grossly intact.  Mentation and speech appropriate for age.  PSYCH: Mentation appears normal, affect normal/bright, judgement and insight intact, normal speech and appearance well-groomed.        Video-Visit Details    Type of service:  Video Visit   Video Start Time: 11:34 AM  Video End Time:11:44 AM    Originating Location (pt. Location): Home  Distant Location (provider location):  On-site  Platform used for Video Visit: Edith

## 2023-06-02 ENCOUNTER — HEALTH MAINTENANCE LETTER (OUTPATIENT)
Age: 33
End: 2023-06-02

## 2023-06-16 ENCOUNTER — MYC REFILL (OUTPATIENT)
Dept: FAMILY MEDICINE | Facility: CLINIC | Age: 33
End: 2023-06-16
Payer: COMMERCIAL

## 2023-06-16 DIAGNOSIS — F90.8 ATTENTION DEFICIT HYPERACTIVITY DISORDER (ADHD), OTHER TYPE: ICD-10-CM

## 2023-06-16 RX ORDER — DEXTROAMPHETAMINE SACCHARATE, AMPHETAMINE ASPARTATE, DEXTROAMPHETAMINE SULFATE AND AMPHETAMINE SULFATE 3.75; 3.75; 3.75; 3.75 MG/1; MG/1; MG/1; MG/1
30 TABLET ORAL 2 TIMES DAILY
Qty: 120 TABLET | Refills: 0 | Status: SHIPPED | OUTPATIENT
Start: 2023-06-18 | End: 2023-07-13

## 2023-07-07 ENCOUNTER — MYC REFILL (OUTPATIENT)
Dept: FAMILY MEDICINE | Facility: CLINIC | Age: 33
End: 2023-07-07
Payer: COMMERCIAL

## 2023-07-07 DIAGNOSIS — M79.641 PAIN OF RIGHT HAND: ICD-10-CM

## 2023-07-07 RX ORDER — OXYCODONE HYDROCHLORIDE 5 MG/1
TABLET ORAL
Qty: 60 TABLET | Refills: 0 | Status: SHIPPED | OUTPATIENT
Start: 2023-07-09 | End: 2023-08-04

## 2023-07-13 ENCOUNTER — MYC REFILL (OUTPATIENT)
Dept: FAMILY MEDICINE | Facility: CLINIC | Age: 33
End: 2023-07-13
Payer: COMMERCIAL

## 2023-07-13 DIAGNOSIS — F90.8 ATTENTION DEFICIT HYPERACTIVITY DISORDER (ADHD), OTHER TYPE: ICD-10-CM

## 2023-07-13 RX ORDER — DEXTROAMPHETAMINE SACCHARATE, AMPHETAMINE ASPARTATE, DEXTROAMPHETAMINE SULFATE AND AMPHETAMINE SULFATE 3.75; 3.75; 3.75; 3.75 MG/1; MG/1; MG/1; MG/1
30 TABLET ORAL 2 TIMES DAILY
Qty: 120 TABLET | Refills: 0 | Status: SHIPPED | OUTPATIENT
Start: 2023-07-17 | End: 2023-08-15

## 2023-07-14 ENCOUNTER — MYC MEDICAL ADVICE (OUTPATIENT)
Dept: FAMILY MEDICINE | Facility: CLINIC | Age: 33
End: 2023-07-14
Payer: COMMERCIAL

## 2023-07-14 NOTE — TELEPHONE ENCOUNTER
Patient asking for early refill of his Adderall that is ordered to refill 7/17/23.    Routing to provider to advise.  Tangela Del Rio BSN, RN

## 2023-08-04 ENCOUNTER — MYC REFILL (OUTPATIENT)
Dept: FAMILY MEDICINE | Facility: CLINIC | Age: 33
End: 2023-08-04
Payer: COMMERCIAL

## 2023-08-04 ENCOUNTER — MYC MEDICAL ADVICE (OUTPATIENT)
Dept: FAMILY MEDICINE | Facility: CLINIC | Age: 33
End: 2023-08-04
Payer: COMMERCIAL

## 2023-08-04 DIAGNOSIS — M79.641 PAIN OF RIGHT HAND: ICD-10-CM

## 2023-08-04 RX ORDER — OXYCODONE HYDROCHLORIDE 5 MG/1
TABLET ORAL
Qty: 60 TABLET | Refills: 0 | Status: SHIPPED | OUTPATIENT
Start: 2023-08-08 | End: 2023-08-28

## 2023-08-04 NOTE — TELEPHONE ENCOUNTER
[facility] Waters's message.Deirdre PICKERING [facility]

## 2023-08-15 ENCOUNTER — MYC REFILL (OUTPATIENT)
Dept: FAMILY MEDICINE | Facility: CLINIC | Age: 33
End: 2023-08-15
Payer: COMMERCIAL

## 2023-08-15 DIAGNOSIS — F90.8 ATTENTION DEFICIT HYPERACTIVITY DISORDER (ADHD), OTHER TYPE: ICD-10-CM

## 2023-08-17 ENCOUNTER — MYC MEDICAL ADVICE (OUTPATIENT)
Dept: FAMILY MEDICINE | Facility: CLINIC | Age: 33
End: 2023-08-17

## 2023-08-17 RX ORDER — DEXTROAMPHETAMINE SACCHARATE, AMPHETAMINE ASPARTATE, DEXTROAMPHETAMINE SULFATE AND AMPHETAMINE SULFATE 3.75; 3.75; 3.75; 3.75 MG/1; MG/1; MG/1; MG/1
30 TABLET ORAL 2 TIMES DAILY
Qty: 60 TABLET | Refills: 0 | Status: SHIPPED | OUTPATIENT
Start: 2023-08-17 | End: 2023-12-19

## 2023-08-28 ENCOUNTER — OFFICE VISIT (OUTPATIENT)
Dept: FAMILY MEDICINE | Facility: CLINIC | Age: 33
End: 2023-08-28
Payer: COMMERCIAL

## 2023-08-28 ENCOUNTER — MYC MEDICAL ADVICE (OUTPATIENT)
Dept: FAMILY MEDICINE | Facility: CLINIC | Age: 33
End: 2023-08-28

## 2023-08-28 VITALS
SYSTOLIC BLOOD PRESSURE: 129 MMHG | DIASTOLIC BLOOD PRESSURE: 93 MMHG | HEART RATE: 104 BPM | HEIGHT: 72 IN | OXYGEN SATURATION: 98 % | BODY MASS INDEX: 32.75 KG/M2 | TEMPERATURE: 98.4 F | WEIGHT: 241.8 LBS

## 2023-08-28 DIAGNOSIS — M79.641 PAIN OF RIGHT HAND: ICD-10-CM

## 2023-08-28 DIAGNOSIS — F90.8 ATTENTION DEFICIT HYPERACTIVITY DISORDER (ADHD), OTHER TYPE: ICD-10-CM

## 2023-08-28 DIAGNOSIS — R21 RASH: Primary | ICD-10-CM

## 2023-08-28 DIAGNOSIS — F32.2 MAJOR DEPRESSIVE DISORDER, SINGLE EPISODE, SEVERE WITHOUT PSYCHOTIC FEATURES (H): ICD-10-CM

## 2023-08-28 PROCEDURE — 99214 OFFICE O/P EST MOD 30 MIN: CPT | Performed by: FAMILY MEDICINE

## 2023-08-28 RX ORDER — DEXTROAMPHETAMINE SACCHARATE, AMPHETAMINE ASPARTATE, DEXTROAMPHETAMINE SULFATE AND AMPHETAMINE SULFATE 7.5; 7.5; 7.5; 7.5 MG/1; MG/1; MG/1; MG/1
30 TABLET ORAL 2 TIMES DAILY
Qty: 60 TABLET | Refills: 0 | Status: SHIPPED | OUTPATIENT
Start: 2023-08-28 | End: 2023-09-28

## 2023-08-28 RX ORDER — TRIAMCINOLONE ACETONIDE 1 MG/G
CREAM TOPICAL 2 TIMES DAILY PRN
Qty: 30 G | Refills: 1 | Status: SHIPPED | OUTPATIENT
Start: 2023-08-28

## 2023-08-28 RX ORDER — NYSTATIN 100000 U/G
CREAM TOPICAL 2 TIMES DAILY
Qty: 15 G | Refills: 1 | Status: SHIPPED | OUTPATIENT
Start: 2023-08-28

## 2023-08-28 RX ORDER — SERTRALINE HYDROCHLORIDE 100 MG/1
TABLET, FILM COATED ORAL
Qty: 90 TABLET | Refills: 1 | Status: SHIPPED | OUTPATIENT
Start: 2023-08-28 | End: 2024-01-24

## 2023-08-28 RX ORDER — OXYCODONE HYDROCHLORIDE 5 MG/1
TABLET ORAL
Qty: 60 TABLET | Refills: 0 | Status: SHIPPED | OUTPATIENT
Start: 2023-09-07 | End: 2023-08-30

## 2023-08-28 ASSESSMENT — PATIENT HEALTH QUESTIONNAIRE - PHQ9
SUM OF ALL RESPONSES TO PHQ QUESTIONS 1-9: 4
SUM OF ALL RESPONSES TO PHQ QUESTIONS 1-9: 4
10. IF YOU CHECKED OFF ANY PROBLEMS, HOW DIFFICULT HAVE THESE PROBLEMS MADE IT FOR YOU TO DO YOUR WORK, TAKE CARE OF THINGS AT HOME, OR GET ALONG WITH OTHER PEOPLE: SOMEWHAT DIFFICULT

## 2023-08-28 ASSESSMENT — PAIN SCALES - GENERAL: PAINLEVEL: NO PAIN (0)

## 2023-08-28 NOTE — PROGRESS NOTES
ASSESSMENT / PLAN:  (R21) Rash  (primary encounter diagnosis)  Comment: toe likely fungal and forehead likely dermatitis from hat  Plan: nystatin (MYCOSTATIN) [id number] UNIT/GM external         cream, triamcinolone (KENALOG) 0.1 % external         cream        Keep toes dry.  Limit hat usage. Reveiwed risks and side effects of medication  Dermatology if worse/not improving. Expected course and warning signs reviewed. Call/email with questions/concerns      (F90.8) Attention deficit hyperactivity disorder (ADHD), other type  Comment: stable  Plan: amphetamine-dextroamphetamine (ADDERALL) 30 MG         tablet        Reveiwed risks and side effects of medication  Psych If worse    (M79.641) Pain of right hand  Comment: stable on BID  Plan: oxyCODONE (ROXICODONE) 5 MG tablet        Needs surgery if future. Continue avoid ALCOHOL and illicit drugs. .sie  Pain specialist if worse    (F32.2) Major depressive disorder, single episode, severe without psychotic features (H)  Comment: needs help but overall a lot better with having son back in life  Plan: sertraline (ZOLOFT) 100 MG tablet        Reveiwed risks and side effects of medication  If SUICIAL IDEATION OR HOMOCIDAL IDEATION OR BERNADETTE TO ER. Good family support. Call/email with questions/concerns  Psych if worst.       Je Romeo is a 32 year old, presenting for the following health issues:  Follow-up adhd, anxiety, migraines, tobacco abuse, insomnia and chronic pain issues.  Had close transerve fracture scaphoid right wrist  Seeing boy now weekends and family/cousin.  Emotionally doing better.   No current dating.   Work busy.   Adderall 30mg BID. Off zoloft but would like restart. No SUICIAL IDEATION OR HOMOCIDAL IDEATION OR BERNADETTE.  No illicit drugs or ALCOHOL.   Possible surgery in future. Rash forehead on/off this summer.   Toe Pain and Derm Problem (forehead)      8/28/2023     4:19 PM   Additional Questions   Roomed by JULES Valdez CMA       History of Present  "Illness       Reason for visit:  Rash maybe toe fungus or athlete foot  Symptom onset:  More than a month  Symptoms include:  Rash smell slime texture between baby and ring toe  Symptom intensity:  Moderate  Symptom progression:  Staying the same  Had these symptoms before:  No  What makes it worse:  No  What makes it better:  No    He eats 2-3 servings of fruits and vegetables daily.He consumes 3 sweetened beverage(s) daily.He exercises with enough effort to increase his heart rate 60 or more minutes per day.  He exercises with enough effort to increase his heart rate 5 days per week.   He is taking medications regularly.           Objective    BP (!) 129/93   Pulse 104   Temp 98.4  F (36.9  C) (Oral)   Ht 1.835 m (6' 0.25\")   Wt 109.7 kg (241 lb 12.8 oz)   SpO2 98%   BMI 32.57 kg/m    Body mass index is 32.57 kg/m .  Physical Exam   GENERAL: healthy, alert and no distress  EYES: Eyes grossly normal to inspection, PERRL and conjunctivae and sclerae normal  HENT: ear canals and TM's normal, nose and mouth without ulcers or lesions  NECK: no adenopathy, no asymmetry, masses, or scars and thyroid normal to palpation  RESP: lungs clear to auscultation - no rales, rhonchi or wheezes  CV: regular rate and rhythm, normal S1 S2, no S3 or S4, no murmur, click or rub, no peripheral edema and peripheral pulses strong  ABDOMEN: soft, nontender, no hepatosplenomegaly, no masses and bowel sounds normal  MS: no gross musculoskeletal defects noted, no edema  SKIN: moist red rash bilateral 4th-5th toe interspace. Red dry rash linear forehead/hat line.   PSYCH: mentation appears normal, affect normal/bright,mildyl anxious            "

## 2023-08-30 RX ORDER — OXYCODONE HYDROCHLORIDE 5 MG/1
TABLET ORAL
Qty: 60 TABLET | Refills: 0 | Status: SHIPPED | OUTPATIENT
Start: 2023-09-01 | End: 2023-09-28

## 2023-09-28 ENCOUNTER — MYC REFILL (OUTPATIENT)
Dept: FAMILY MEDICINE | Facility: CLINIC | Age: 33
End: 2023-09-28
Payer: COMMERCIAL

## 2023-09-28 DIAGNOSIS — M79.641 PAIN OF RIGHT HAND: ICD-10-CM

## 2023-09-28 DIAGNOSIS — F90.8 ATTENTION DEFICIT HYPERACTIVITY DISORDER (ADHD), OTHER TYPE: ICD-10-CM

## 2023-09-28 RX ORDER — DEXTROAMPHETAMINE SACCHARATE, AMPHETAMINE ASPARTATE, DEXTROAMPHETAMINE SULFATE AND AMPHETAMINE SULFATE 7.5; 7.5; 7.5; 7.5 MG/1; MG/1; MG/1; MG/1
30 TABLET ORAL 2 TIMES DAILY
Qty: 60 TABLET | Refills: 0 | Status: SHIPPED | OUTPATIENT
Start: 2023-09-30 | End: 2023-10-24

## 2023-09-28 RX ORDER — OXYCODONE HYDROCHLORIDE 5 MG/1
TABLET ORAL
Qty: 60 TABLET | Refills: 0 | Status: SHIPPED | OUTPATIENT
Start: 2023-10-02 | End: 2023-10-05

## 2023-10-02 ENCOUNTER — TELEPHONE (OUTPATIENT)
Dept: FAMILY MEDICINE | Facility: CLINIC | Age: 33
End: 2023-10-02
Payer: COMMERCIAL

## 2023-10-02 NOTE — TELEPHONE ENCOUNTER
Fax message regarding   oxyCODONE (ROXICODONE) 5 MG tablet   Rcvd:  His new insurance will only cover a 7 day supply for the initial fill. We are only filling 7 day supply. Please send over a new RX so we can fill more tabs after his 7 days are complete. Thank you.

## 2023-10-05 ENCOUNTER — MYC REFILL (OUTPATIENT)
Dept: FAMILY MEDICINE | Facility: CLINIC | Age: 33
End: 2023-10-05
Payer: COMMERCIAL

## 2023-10-05 ENCOUNTER — MYC MEDICAL ADVICE (OUTPATIENT)
Dept: FAMILY MEDICINE | Facility: CLINIC | Age: 33
End: 2023-10-05
Payer: COMMERCIAL

## 2023-10-05 DIAGNOSIS — M79.641 PAIN OF RIGHT HAND: ICD-10-CM

## 2023-10-05 RX ORDER — OXYCODONE HYDROCHLORIDE 5 MG/1
TABLET ORAL
Qty: 60 TABLET | Refills: 0 | Status: SHIPPED | OUTPATIENT
Start: 2023-10-07 | End: 2023-11-05

## 2023-10-05 NOTE — TELEPHONE ENCOUNTER
Provider:  Would you like to do a P/A for the patient's amphetamine-dextroamphetamine (ADDERALL) 30 MG tablet?  Thank you. Tayla Talley R.N.

## 2023-10-06 ENCOUNTER — TELEPHONE (OUTPATIENT)
Dept: FAMILY MEDICINE | Facility: CLINIC | Age: 33
End: 2023-10-06
Payer: COMMERCIAL

## 2023-10-06 NOTE — TELEPHONE ENCOUNTER
Please start PA per patient request for adderall.   See MyChart encounter from patient regarding this request.     Tangela MAYON, RN

## 2023-10-10 NOTE — TELEPHONE ENCOUNTER
Central Prior Authorization Team   Phone: [phone]    PA Initiation    Medication:   Insurance Company: [employer] - Phone [phone] Fax [phone]  Pharmacy Filling the Rx: [id number] - [place], [place] - [address] AT [address]  Filling Pharmacy Phone: [phone]  Filling Pharmacy Fax: [phone]  Start Date: 10/10/2023

## 2023-10-12 NOTE — TELEPHONE ENCOUNTER
Prior Authorization Approval    Authorization Effective Date: 10/12/2023  Authorization Expiration Date: 10/11/2024  Medication: AMPHETAMINE-DEXTRO - APPROVED  Approved Dose/Quantity:    Reference #:     Insurance Company: [employer] - Phone [phone] Fax [phone]  Expected CoPay:       CoPay Card Available:      Foundation Assistance Needed:    Which Pharmacy is filling the prescription (Not needed for infusion/clinic administered): [id number] - [facility] [address] AT [address] [facility]  Pharmacy Notified:  YES  Patient Notified:  YES  **Instructed pharmacy to notify patient when script is ready to /ship.**

## 2023-10-19 ENCOUNTER — MYC MEDICAL ADVICE (OUTPATIENT)
Dept: FAMILY MEDICINE | Facility: CLINIC | Age: 33
End: 2023-10-19
Payer: COMMERCIAL

## 2023-10-19 DIAGNOSIS — F90.8 ATTENTION DEFICIT HYPERACTIVITY DISORDER (ADHD), OTHER TYPE: ICD-10-CM

## 2023-10-24 RX ORDER — DEXTROAMPHETAMINE SACCHARATE, AMPHETAMINE ASPARTATE, DEXTROAMPHETAMINE SULFATE AND AMPHETAMINE SULFATE 7.5; 7.5; 7.5; 7.5 MG/1; MG/1; MG/1; MG/1
30 TABLET ORAL 2 TIMES DAILY
Qty: 60 TABLET | Refills: 0 | Status: SHIPPED | OUTPATIENT
Start: 2023-10-28 | End: 2023-11-22

## 2023-11-05 ENCOUNTER — MYC REFILL (OUTPATIENT)
Dept: FAMILY MEDICINE | Facility: CLINIC | Age: 33
End: 2023-11-05
Payer: COMMERCIAL

## 2023-11-05 DIAGNOSIS — M79.641 PAIN OF RIGHT HAND: ICD-10-CM

## 2023-11-06 ENCOUNTER — MYC REFILL (OUTPATIENT)
Dept: FAMILY MEDICINE | Facility: CLINIC | Age: 33
End: 2023-11-06
Payer: COMMERCIAL

## 2023-11-06 ENCOUNTER — MYC MEDICAL ADVICE (OUTPATIENT)
Dept: FAMILY MEDICINE | Facility: CLINIC | Age: 33
End: 2023-11-06
Payer: COMMERCIAL

## 2023-11-06 DIAGNOSIS — M79.641 PAIN OF RIGHT HAND: ICD-10-CM

## 2023-11-06 RX ORDER — OXYCODONE HYDROCHLORIDE 5 MG/1
TABLET ORAL
Qty: 60 TABLET | Refills: 0 | Status: SHIPPED | OUTPATIENT
Start: 2023-11-07 | End: 2023-11-06

## 2023-11-06 RX ORDER — OXYCODONE HYDROCHLORIDE 5 MG/1
TABLET ORAL
Qty: 60 TABLET | Refills: 0 | Status: SHIPPED | OUTPATIENT
Start: 2023-11-06 | End: 2023-12-04

## 2023-11-22 ENCOUNTER — MYC REFILL (OUTPATIENT)
Dept: FAMILY MEDICINE | Facility: CLINIC | Age: 33
End: 2023-11-22
Payer: COMMERCIAL

## 2023-11-22 DIAGNOSIS — F90.8 ATTENTION DEFICIT HYPERACTIVITY DISORDER (ADHD), OTHER TYPE: ICD-10-CM

## 2023-11-22 RX ORDER — DEXTROAMPHETAMINE SACCHARATE, AMPHETAMINE ASPARTATE, DEXTROAMPHETAMINE SULFATE AND AMPHETAMINE SULFATE 7.5; 7.5; 7.5; 7.5 MG/1; MG/1; MG/1; MG/1
30 TABLET ORAL 2 TIMES DAILY
Qty: 60 TABLET | Refills: 0 | Status: SHIPPED | OUTPATIENT
Start: 2023-11-27 | End: 2023-12-26

## 2023-12-04 ENCOUNTER — MYC REFILL (OUTPATIENT)
Dept: FAMILY MEDICINE | Facility: CLINIC | Age: 33
End: 2023-12-04
Payer: COMMERCIAL

## 2023-12-04 DIAGNOSIS — M79.641 PAIN OF RIGHT HAND: ICD-10-CM

## 2023-12-04 RX ORDER — OXYCODONE HYDROCHLORIDE 5 MG/1
TABLET ORAL
Qty: 60 TABLET | Refills: 0 | Status: SHIPPED | OUTPATIENT
Start: 2023-12-05 | End: 2023-12-29

## 2023-12-19 ENCOUNTER — MYC MEDICAL ADVICE (OUTPATIENT)
Dept: FAMILY MEDICINE | Facility: CLINIC | Age: 33
End: 2023-12-19
Payer: COMMERCIAL

## 2023-12-19 DIAGNOSIS — F90.8 ATTENTION DEFICIT HYPERACTIVITY DISORDER (ADHD), OTHER TYPE: ICD-10-CM

## 2023-12-20 ENCOUNTER — MYC MEDICAL ADVICE (OUTPATIENT)
Dept: FAMILY MEDICINE | Facility: CLINIC | Age: 33
End: 2023-12-20
Payer: COMMERCIAL

## 2023-12-20 RX ORDER — DEXTROAMPHETAMINE SACCHARATE, AMPHETAMINE ASPARTATE, DEXTROAMPHETAMINE SULFATE AND AMPHETAMINE SULFATE 3.75; 3.75; 3.75; 3.75 MG/1; MG/1; MG/1; MG/1
30 TABLET ORAL 2 TIMES DAILY
Qty: 60 TABLET | Refills: 0 | Status: SHIPPED | OUTPATIENT
Start: 2023-12-26 | End: 2023-12-20

## 2023-12-20 NOTE — TELEPHONE ENCOUNTER
Provider: can we give a verbal to fill script early? Fill date is not until 12/26 due to holidays? Changed prescription for adderall. Pended med below.    Patient requesting for a fill date of 12/21 or 12/22    Daniella Fong RN

## 2023-12-20 NOTE — TELEPHONE ENCOUNTER
Patient requesting early refill on amphetamine-dextroamphetamine (ADDERALL) 15 MG tablet due to the upcoming holidays.     Med and pharmacy pended below.     Daniella Fong RN

## 2023-12-21 RX ORDER — DEXTROAMPHETAMINE SACCHARATE, AMPHETAMINE ASPARTATE, DEXTROAMPHETAMINE SULFATE AND AMPHETAMINE SULFATE 3.75; 3.75; 3.75; 3.75 MG/1; MG/1; MG/1; MG/1
30 TABLET ORAL 2 TIMES DAILY
Qty: 120 TABLET | Refills: 0 | Status: SHIPPED | OUTPATIENT
Start: 2023-12-26 | End: 2024-02-02

## 2023-12-26 ENCOUNTER — MYC REFILL (OUTPATIENT)
Dept: FAMILY MEDICINE | Facility: CLINIC | Age: 33
End: 2023-12-26
Payer: COMMERCIAL

## 2023-12-26 DIAGNOSIS — F90.8 ATTENTION DEFICIT HYPERACTIVITY DISORDER (ADHD), OTHER TYPE: ICD-10-CM

## 2023-12-26 RX ORDER — DEXTROAMPHETAMINE SACCHARATE, AMPHETAMINE ASPARTATE, DEXTROAMPHETAMINE SULFATE AND AMPHETAMINE SULFATE 7.5; 7.5; 7.5; 7.5 MG/1; MG/1; MG/1; MG/1
30 TABLET ORAL 2 TIMES DAILY
Qty: 60 TABLET | Refills: 0 | Status: SHIPPED | OUTPATIENT
Start: 2023-12-26 | End: 2024-04-01

## 2023-12-27 ENCOUNTER — TELEPHONE (OUTPATIENT)
Dept: FAMILY MEDICINE | Facility: CLINIC | Age: 33
End: 2023-12-27
Payer: COMMERCIAL

## 2023-12-27 NOTE — TELEPHONE ENCOUNTER
Prior Authorization Retail Medication Request    Medication/Dose: amphetamine-dextroamphetamine (ADDERALL) 15 MG tablet  Diagnosis and ICD code (if different than what is on RX):  Attention deficit hyperactivity disorder (ADHD), other type [F90.8]   New/renewal/insurance change PA/secondary ins. PA:  Previously Tried and Failed:    Rationale:      Covermymeds Key: [id number]

## 2023-12-29 ENCOUNTER — MYC REFILL (OUTPATIENT)
Dept: FAMILY MEDICINE | Facility: CLINIC | Age: 33
End: 2023-12-29
Payer: COMMERCIAL

## 2023-12-29 DIAGNOSIS — M79.641 PAIN OF RIGHT HAND: ICD-10-CM

## 2023-12-29 RX ORDER — OXYCODONE HYDROCHLORIDE 5 MG/1
TABLET ORAL
Qty: 60 TABLET | Refills: 0 | Status: SHIPPED | OUTPATIENT
Start: 2024-01-04 | End: 2024-02-02

## 2024-01-04 ENCOUNTER — MYC MEDICAL ADVICE (OUTPATIENT)
Dept: FAMILY MEDICINE | Facility: CLINIC | Age: 34
End: 2024-01-04
Payer: COMMERCIAL

## 2024-01-04 ENCOUNTER — TELEPHONE (OUTPATIENT)
Dept: FAMILY MEDICINE | Facility: CLINIC | Age: 34
End: 2024-01-04
Payer: COMMERCIAL

## 2024-01-04 DIAGNOSIS — F90.8 ATTENTION DEFICIT HYPERACTIVITY DISORDER (ADHD), OTHER TYPE: ICD-10-CM

## 2024-01-04 NOTE — TELEPHONE ENCOUNTER
Provider:  Would you like a prior authorization to be started on oxycodone? If you do, please state so and send to P AN TC to start telephone encounter ans route to the appropriate team.  Thank you. Tayla Talley R.N.

## 2024-01-04 NOTE — TELEPHONE ENCOUNTER
Prior Authorization Retail Medication Request    Medication/Dose: oxyCODONE (ROXICODONE) 5 MG tablet  Diagnosis and ICD code (if different than what is on RX):    Pain of right hand [M79.641]          Insurance   Primary: Phone #: [phone]  Insurance ID:  [id number]    Pharmacy Information (if different than what is on RX)  Name:  [facility]  Phone:  [phone]  Fax:[phone]

## 2024-01-05 NOTE — TELEPHONE ENCOUNTER
We can try P.A. but likely won't be covered even with P.A.. oxycodone is generic and should be cheap to get out of pocket. Karan Waters MD

## 2024-01-08 ENCOUNTER — TELEPHONE (OUTPATIENT)
Dept: FAMILY MEDICINE | Facility: CLINIC | Age: 34
End: 2024-01-08
Payer: COMMERCIAL

## 2024-01-08 NOTE — TELEPHONE ENCOUNTER
I have no idea what his insurance covers and why they can change. Some insurance just won't cover certain types of medications. Adderall short acting is cheaper then long acting so if has to pay out of pocket I don't think it's terribly expensive and if paying for meds out of pocket it's best to shop around to see which pharmacy is cheapest. Karan Waters MD

## 2024-01-08 NOTE — TELEPHONE ENCOUNTER
Patient now asking for a PA to be done for the adderall 30 mg [employer] [place] insurance.   Ok to start this process?    Tangela MAYON, RN

## 2024-01-09 NOTE — TELEPHONE ENCOUNTER
PA Initiation    Medication: OXYCODONE HCL 5 MG PO TABS  Insurance Company: Other (see comments)Comment:  [facility]  Pharmacy Filling the Rx: [id number] - [place], [place] - [address] AT [address] & [address]  Filling Pharmacy Phone: [phone]  Filling Pharmacy Fax: [phone]  Start Date: 1/9/2024

## 2024-01-09 NOTE — TELEPHONE ENCOUNTER
Prior Authorization Approval    Medication: OXYCODONE HCL 5 MG PO TABS  Authorization Effective Date: 1/9/2024  Authorization Expiration Date: 7/7/2024  Approved Dose/Quantity: 60/30ds  Reference #: [id number]   Insurance Company: Other (see comments)Comment:  [employer]  Which Pharmacy is filling the prescription: [facility] #[id number] - [place], [place] - [address] AT [address]  Pharmacy Notified: y  Patient Notified: y - pharmacy to notify

## 2024-01-09 NOTE — TELEPHONE ENCOUNTER
Prior Authorization Not Needed per Insurance    Medication: AMPHETAMINE-DEXTROAMPHETAMINE 30 MG PO TABS  Insurance Company: Other (see comments)Comment:  [employer]  Expected CoPay: $    Pharmacy Filling the Rx: [facility][id number] - [place], [place] - [address] AT [address]  Pharmacy Notified: y  Patient Notified: y - pharmacy to notify

## 2024-01-24 ENCOUNTER — MYC REFILL (OUTPATIENT)
Dept: FAMILY MEDICINE | Facility: CLINIC | Age: 34
End: 2024-01-24
Payer: COMMERCIAL

## 2024-01-24 DIAGNOSIS — G89.29 CHRONIC BILATERAL LOW BACK PAIN WITH RIGHT-SIDED SCIATICA: ICD-10-CM

## 2024-01-24 DIAGNOSIS — F90.8 ATTENTION DEFICIT HYPERACTIVITY DISORDER (ADHD), OTHER TYPE: ICD-10-CM

## 2024-01-24 DIAGNOSIS — M54.41 CHRONIC BILATERAL LOW BACK PAIN WITH RIGHT-SIDED SCIATICA: ICD-10-CM

## 2024-01-24 DIAGNOSIS — F32.2 MAJOR DEPRESSIVE DISORDER, SINGLE EPISODE, SEVERE WITHOUT PSYCHOTIC FEATURES (H): ICD-10-CM

## 2024-01-25 RX ORDER — CYCLOBENZAPRINE HCL 10 MG
TABLET ORAL
Qty: 30 TABLET | Refills: 1 | Status: SHIPPED | OUTPATIENT
Start: 2024-01-25 | End: 2024-06-26

## 2024-01-25 RX ORDER — SERTRALINE HYDROCHLORIDE 100 MG/1
TABLET, FILM COATED ORAL
Qty: 90 TABLET | Refills: 0 | Status: SHIPPED | OUTPATIENT
Start: 2024-01-25 | End: 2024-04-01

## 2024-01-25 RX ORDER — DEXTROAMPHETAMINE SACCHARATE, AMPHETAMINE ASPARTATE, DEXTROAMPHETAMINE SULFATE AND AMPHETAMINE SULFATE 3.75; 3.75; 3.75; 3.75 MG/1; MG/1; MG/1; MG/1
30 TABLET ORAL 2 TIMES DAILY
Qty: 120 TABLET | Refills: 0 | OUTPATIENT
Start: 2024-01-25

## 2024-02-02 ENCOUNTER — MYC REFILL (OUTPATIENT)
Dept: FAMILY MEDICINE | Facility: CLINIC | Age: 34
End: 2024-02-02
Payer: COMMERCIAL

## 2024-02-02 DIAGNOSIS — F90.8 ATTENTION DEFICIT HYPERACTIVITY DISORDER (ADHD), OTHER TYPE: ICD-10-CM

## 2024-02-02 DIAGNOSIS — M79.641 PAIN OF RIGHT HAND: ICD-10-CM

## 2024-02-02 RX ORDER — DEXTROAMPHETAMINE SACCHARATE, AMPHETAMINE ASPARTATE, DEXTROAMPHETAMINE SULFATE AND AMPHETAMINE SULFATE 3.75; 3.75; 3.75; 3.75 MG/1; MG/1; MG/1; MG/1
30 TABLET ORAL 2 TIMES DAILY
Qty: 120 TABLET | Refills: 0 | Status: SHIPPED | OUTPATIENT
Start: 2024-02-02 | End: 2024-03-01

## 2024-02-02 RX ORDER — OXYCODONE HYDROCHLORIDE 5 MG/1
TABLET ORAL
Qty: 60 TABLET | Refills: 0 | Status: SHIPPED | OUTPATIENT
Start: 2024-02-02 | End: 2024-03-01

## 2024-02-29 ENCOUNTER — OFFICE VISIT (OUTPATIENT)
Dept: URGENT CARE | Facility: URGENT CARE | Age: 34
End: 2024-02-29
Payer: COMMERCIAL

## 2024-02-29 VITALS
WEIGHT: 244.4 LBS | HEART RATE: 96 BPM | RESPIRATION RATE: 19 BRPM | OXYGEN SATURATION: 99 % | BODY MASS INDEX: 32.92 KG/M2 | DIASTOLIC BLOOD PRESSURE: 95 MMHG | SYSTOLIC BLOOD PRESSURE: 134 MMHG | TEMPERATURE: 98.5 F

## 2024-02-29 DIAGNOSIS — R50.9 FEVER, UNSPECIFIED: ICD-10-CM

## 2024-02-29 DIAGNOSIS — J10.1 INFLUENZA B: Primary | ICD-10-CM

## 2024-02-29 LAB
DEPRECATED S PYO AG THROAT QL EIA: NEGATIVE
FLUAV AG SPEC QL IA: NEGATIVE
FLUBV AG SPEC QL IA: POSITIVE
GROUP A STREP BY PCR: NOT DETECTED

## 2024-02-29 PROCEDURE — 87804 INFLUENZA ASSAY W/OPTIC: CPT

## 2024-02-29 PROCEDURE — 87651 STREP A DNA AMP PROBE: CPT

## 2024-02-29 PROCEDURE — 99213 OFFICE O/P EST LOW 20 MIN: CPT

## 2024-02-29 RX ORDER — OSELTAMIVIR PHOSPHATE 75 MG/1
75 CAPSULE ORAL 2 TIMES DAILY
Qty: 10 CAPSULE | Refills: 0 | Status: SHIPPED | OUTPATIENT
Start: 2024-02-29 | End: 2024-03-05

## 2024-02-29 NOTE — LETTER
February 29, 2024      Tejas Villalba III  [address] [address]        To Whom It May Concern:    Tejas Villalba III  was seen on February 29, 2024.  Please excuse him from work until March 1st unless continued fever then return would be March 4th due to illness.        Sincerely,        MADDIE Brown CNP

## 2024-02-29 NOTE — PROGRESS NOTES
ASSESSMENT:  (J10.1) Influenza B  (primary encounter diagnosis)  Plan: oseltamivir (TAMIFLU) 75 MG capsule    (R50.9) Fever, unspecified  Plan: Streptococcus A Rapid Screen w/Reflex to PCR -         Clinic Collect, Influenza A & B Antigen -         Clinic Collect, Group A Streptococcus PCR         Throat Swab    PLAN:  Informed the patient that the strep test is negative and the influenza test positive for influenza B.  We discussed the need to take Tamiflu as prescribed and stay home from activities/work for the next 24 hours and until fever free.  We discussed the need for him  to get plenty of rest, drink fluids and use Tylenol as needed for pain and fever with a maximum dose of Tylenol being 4000 mg in a 24-hour period of time.  Work note provided.  Discussed the need to return to clinic with any new or worsening symptoms.  Patient acknowledged his understanding of the above plan.    The use of Dragon/weeSPINation services may have been used to construct the content in this note; any grammatical or spelling errors are non-intentional. Please contact the author of this note directly if you are in need of any clarification.      Jm Hoang, MADDIE CNP      SUBJECTIVE:   Tejas Villalba III is a 33 year old male presenting with a chief complaint of fever, chills, sweats, runny nose, cough - non-productive, sore throat, body aches, fatigue, and nausea.  Onset of symptoms was 3 day(s) ago.  Course of illness is worsening.    Patient denies: ear pain, vomiting, and diarrhea  Treatment measures tried include Tylenol/Ibuprofen, Airborne and DayQuil.  Predisposing factors include ill contact: Family member .    ROS:  Negative except noted above.    OBJECTIVE:  BP (!) 134/95 (BP Location: Right arm, Cuff Size: Adult Large)   Pulse 96   Temp 98.5  F (36.9  C) (Tympanic)   Resp 19   Wt 110.9 kg (244 lb 6.4 oz)   SpO2 99%   BMI 32.92 kg/m    GENERAL APPEARANCE: healthy, alert and no distress  EYES:  EOMI,  PERRL, conjunctiva clear  HENT: nose and mouth without erythema, ulcers or lesions and oral mucous membranes moist, no erythema noted  NECK: left sided anterior cervical lymphadenopathy  RESP: lungs clear to auscultation - no rales, rhonchi or wheezes  CV: regular rates and rhythm, normal S1 S2, no murmur noted  SKIN: no suspicious lesions or rashes    Rapid Strep test: Negative

## 2024-02-29 NOTE — PATIENT INSTRUCTIONS
Strep test is negative.  Influenza test positive for influenza B.  Take Tamiflu as prescribed.  Stay home activities/work for the next 24 hours and until fever free.  Get plenty of rest and drink fluids.  Can use Tylenol as needed for pain.  Maximum dose of Tylenol is 4000mg in a 24 hour period of time.

## 2024-02-29 NOTE — LETTER
February 29, 2024      Tejas Villalba III  [address] [facility] [address]        To Whom It May Concern:    Tejas Villalba III  was seen on February 29, 2024.  Please excuse him from work until March 1st unless the fever persists then he could return to work on March 4th due to influenza.        Sincerely,        MADDIE Brown CNP

## 2024-03-21 ENCOUNTER — MYC MEDICAL ADVICE (OUTPATIENT)
Dept: FAMILY MEDICINE | Facility: CLINIC | Age: 34
End: 2024-03-21
Payer: COMMERCIAL

## 2024-04-01 ENCOUNTER — VIRTUAL VISIT (OUTPATIENT)
Dept: FAMILY MEDICINE | Facility: CLINIC | Age: 34
End: 2024-04-01
Payer: COMMERCIAL

## 2024-04-01 ENCOUNTER — MYC MEDICAL ADVICE (OUTPATIENT)
Dept: FAMILY MEDICINE | Facility: CLINIC | Age: 34
End: 2024-04-01

## 2024-04-01 DIAGNOSIS — F32.2 MAJOR DEPRESSIVE DISORDER, SINGLE EPISODE, SEVERE WITHOUT PSYCHOTIC FEATURES (H): ICD-10-CM

## 2024-04-01 DIAGNOSIS — M79.641 PAIN OF RIGHT HAND: ICD-10-CM

## 2024-04-01 DIAGNOSIS — F90.8 ATTENTION DEFICIT HYPERACTIVITY DISORDER (ADHD), OTHER TYPE: ICD-10-CM

## 2024-04-01 DIAGNOSIS — R21 RASH: ICD-10-CM

## 2024-04-01 PROCEDURE — 99214 OFFICE O/P EST MOD 30 MIN: CPT | Mod: 95 | Performed by: FAMILY MEDICINE

## 2024-04-01 RX ORDER — DEXTROAMPHETAMINE SACCHARATE, AMPHETAMINE ASPARTATE, DEXTROAMPHETAMINE SULFATE AND AMPHETAMINE SULFATE 3.75; 3.75; 3.75; 3.75 MG/1; MG/1; MG/1; MG/1
30 TABLET ORAL 2 TIMES DAILY
Qty: 120 TABLET | Refills: 0 | Status: SHIPPED | OUTPATIENT
Start: 2024-04-01 | End: 2024-04-01

## 2024-04-01 RX ORDER — SERTRALINE HYDROCHLORIDE 100 MG/1
TABLET, FILM COATED ORAL
Qty: 90 TABLET | Refills: 1 | Status: SHIPPED | OUTPATIENT
Start: 2024-04-01

## 2024-04-01 RX ORDER — DEXTROAMPHETAMINE SACCHARATE, AMPHETAMINE ASPARTATE, DEXTROAMPHETAMINE SULFATE AND AMPHETAMINE SULFATE 5; 5; 5; 5 MG/1; MG/1; MG/1; MG/1
20 TABLET ORAL 3 TIMES DAILY
Qty: 90 TABLET | Refills: 0 | Status: SHIPPED | OUTPATIENT
Start: 2024-04-01 | End: 2024-04-29

## 2024-04-01 RX ORDER — DEXTROAMPHETAMINE SACCHARATE, AMPHETAMINE ASPARTATE, DEXTROAMPHETAMINE SULFATE AND AMPHETAMINE SULFATE 3.75; 3.75; 3.75; 3.75 MG/1; MG/1; MG/1; MG/1
30 TABLET ORAL 2 TIMES DAILY
Qty: 120 TABLET | Refills: 0 | Status: SHIPPED | OUTPATIENT
Start: 2024-04-01

## 2024-04-01 RX ORDER — OXYCODONE HYDROCHLORIDE 5 MG/1
TABLET ORAL
Qty: 60 TABLET | Refills: 0 | Status: SHIPPED | OUTPATIENT
Start: 2024-04-01 | End: 2024-04-01

## 2024-04-01 RX ORDER — SERTRALINE HYDROCHLORIDE 100 MG/1
TABLET, FILM COATED ORAL
Qty: 90 TABLET | Refills: 1 | Status: SHIPPED | OUTPATIENT
Start: 2024-04-01 | End: 2024-04-01

## 2024-04-01 RX ORDER — OXYCODONE HYDROCHLORIDE 5 MG/1
TABLET ORAL
Qty: 60 TABLET | Refills: 0 | Status: SHIPPED | OUTPATIENT
Start: 2024-04-01 | End: 2024-04-02

## 2024-04-01 ASSESSMENT — PATIENT HEALTH QUESTIONNAIRE - PHQ9
SUM OF ALL RESPONSES TO PHQ QUESTIONS 1-9: 2
SUM OF ALL RESPONSES TO PHQ QUESTIONS 1-9: 2
10. IF YOU CHECKED OFF ANY PROBLEMS, HOW DIFFICULT HAVE THESE PROBLEMS MADE IT FOR YOU TO DO YOUR WORK, TAKE CARE OF THINGS AT HOME, OR GET ALONG WITH OTHER PEOPLE: NOT DIFFICULT AT ALL

## 2024-04-01 NOTE — TELEPHONE ENCOUNTER
Patient says pharmacy out of the adderall 15 mg and is asking for 20 mg instead.     Routing to provider to advise.  Tangela Del Rio BSN, RN

## 2024-04-01 NOTE — PROGRESS NOTES
"    Instructions Relayed to Patient by Virtual Roomer:     Patient is active on [employer]:   Relayed following to patient: \"It looks like you are active on [employer], are you able to join the visit this way? If not, do you need us to send you a link now or would you like your provider to send a link via text or email when they are ready to initiate the visit?\"    Reminded patient to ensure they were logged on to virtual visit by arrival time listed. Documented in appointment notes if patient had flexibility to initiate visit sooner than arrival time. If pediatric virtual visit, ensured pediatric patient along with parent/guardian will be present for video visit.     Patient offered the website [email or web address] and/or phone number to [employer] Help line: [phone]      Ousmane is a 33 year old who is being evaluated via a billable video visit.    How would you like to obtain your AVS? JumpChat  If the video visit is dropped, the invitation should be resent by: Text to cell phone: [phone]  Will anyone else be joining your video visit? No    ASSESSMENT / PLAN:  (F32.2) Major depressive disorder, single episode, severe without psychotic features (H)  Comment: stable  Plan: sertraline (ZOLOFT) 100 MG tablet        Good support system and seeing son more regularly now. Recheck in 6 months  Sooner if worse. If SUICIAL IDEATION OR HOMOCIDAL IDEATION OR BERNADETTE TO ER     (F90.8) Attention deficit hyperactivity disorder (ADHD), other type  Comment: stable  Plan: amphetamine-dextroamphetamine (ADDERALL) 15 MG         tablet        Continue meds. Recheck in 6 months  Sooner if worse. Psych help if needed. Call/email with questions/concerns      (E23.156) Pain of right hand  Comment: chronic  Plan: oxyCODONE (ROXICODONE) 5 MG tablet        Surgery on hold. Reveiwed risks and side effects of medication  Avoid with ALCOHOL. Call/email with questions/concerns  Follow-up per specialist.       Je Romeo is a 33 " year old, presenting for the following health issues:  Follow-up adhd, anxiety, migraines, tobacco abuse, insomnia and chronic pain issues.   Work - hit/miss.   Seeing son 10yo about 50%.   Rare propranolol. Outside blood pressure reading ok.   Surgery on hold - insurance changed. Quit smoking  No chief complaint on file.      4/1/2024     1:02 PM   Additional Questions   Roomed by Esperanza     History of Present Illness       Back Pain:  He presents for follow up of back pain. Patient's back pain is a chronic problem.  Location of back pain:  Right side of waist  Description of back pain: sharp  Back pain spreads: nowhere    Since patient first noticed back pain, pain is: gradually worsening  Does back pain interfere with his job:  Yes       Reason for visit:  Adhd, back pain    He eats 0-1 servings of fruits and vegetables daily.He consumes 3 sweetened beverage(s) daily.He exercises with enough effort to increase his heart rate 60 or more minutes per day.  He exercises with enough effort to increase his heart rate 7 days per week.   He is taking medications regularly.           Objective           Vitals:  No vitals were obtained today due to virtual visit.    Physical Exam   GENERAL: alert and no distress  EYES: Eyes grossly normal to inspection.  No discharge or erythema, or obvious scleral/conjunctival abnormalities.  RESP: No audible wheeze, cough, or visible cyanosis.    SKIN: Visible skin clear. No significant rash, abnormal pigmentation or lesions.  NEURO: Cranial nerves grossly intact.  Mentation and speech appropriate for age.  PSYCH: Appropriate affect, tone, and pace of words        Video-Visit Details    Type of service:  Video Visit   Originating Location (pt. Location): Home    Distant Location (provider location):  On-site  Platform used for Video Visit: Edith  Signed Electronically by: Karan Waters MD

## 2024-04-01 NOTE — PROGRESS NOTES
Prescriptions for Adderall 15 mg tablet, Sertraline 100 mg tablet, and Oxycodone 5 mg tablet faxed to [facility] #[id number], [facility] confirmed.  Alma Rosa GRIGGS    [facility]

## 2024-04-02 RX ORDER — OXYCODONE HYDROCHLORIDE 5 MG/1
TABLET ORAL
Qty: 60 TABLET | Refills: 0 | Status: SHIPPED | OUTPATIENT
Start: 2024-04-02 | End: 2024-04-29

## 2024-04-02 NOTE — TELEPHONE ENCOUNTER
Oxycodone Rx was local-printed instead of E-prescribed to pharmacy.  RN pended medication and pharmacy for PCP to sign/ send to pharmacy.    Tangela MAYON, RN
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
## 2024-04-29 ENCOUNTER — MYC MEDICAL ADVICE (OUTPATIENT)
Dept: FAMILY MEDICINE | Facility: CLINIC | Age: 34
End: 2024-04-29
Payer: COMMERCIAL

## 2024-04-29 DIAGNOSIS — M79.641 PAIN OF RIGHT HAND: ICD-10-CM

## 2024-04-29 DIAGNOSIS — F90.8 ATTENTION DEFICIT HYPERACTIVITY DISORDER (ADHD), OTHER TYPE: ICD-10-CM

## 2024-04-29 RX ORDER — OXYCODONE HYDROCHLORIDE 5 MG/1
TABLET ORAL
Qty: 60 TABLET | Refills: 0 | Status: SHIPPED | OUTPATIENT
Start: 2024-04-30 | End: 2024-05-29

## 2024-04-29 RX ORDER — DEXTROAMPHETAMINE SACCHARATE, AMPHETAMINE ASPARTATE, DEXTROAMPHETAMINE SULFATE AND AMPHETAMINE SULFATE 5; 5; 5; 5 MG/1; MG/1; MG/1; MG/1
20 TABLET ORAL 3 TIMES DAILY
Qty: 90 TABLET | Refills: 0 | Status: SHIPPED | OUTPATIENT
Start: 2024-04-30 | End: 2024-05-29

## 2024-05-07 ENCOUNTER — TELEPHONE (OUTPATIENT)
Dept: FAMILY MEDICINE | Facility: CLINIC | Age: 34
End: 2024-05-07
Payer: COMMERCIAL

## 2024-05-07 NOTE — TELEPHONE ENCOUNTER
Attempted to reach pt by phone to triage anxiety sx. See 5/7/24 MyChart encounter.     Simran Palm, BSN, RN

## 2024-05-08 DIAGNOSIS — F41.9 ANXIETY: ICD-10-CM

## 2024-05-08 RX ORDER — PROPRANOLOL HYDROCHLORIDE 20 MG/1
TABLET ORAL
Qty: 270 TABLET | OUTPATIENT
Start: 2024-05-08

## 2024-05-08 NOTE — TELEPHONE ENCOUNTER
Pharmacy requested refills that are already active on file. Refused request to pharmacy.  
Statement Selected

## 2024-05-08 NOTE — TELEPHONE ENCOUNTER
Please see [employer] message dated 5/7/2024.  Patient responded to our outreaches via [employer].  That message has been sent to the provider to advise.  Closing this encounter.  Thank you. Tayla Talley R.N.

## 2024-05-28 ENCOUNTER — MYC MEDICAL ADVICE (OUTPATIENT)
Dept: FAMILY MEDICINE | Facility: CLINIC | Age: 34
End: 2024-05-28
Payer: COMMERCIAL

## 2024-05-28 DIAGNOSIS — F90.8 ATTENTION DEFICIT HYPERACTIVITY DISORDER (ADHD), OTHER TYPE: ICD-10-CM

## 2024-05-29 ENCOUNTER — MYC REFILL (OUTPATIENT)
Dept: FAMILY MEDICINE | Facility: CLINIC | Age: 34
End: 2024-05-29
Payer: COMMERCIAL

## 2024-05-29 DIAGNOSIS — M79.641 PAIN OF RIGHT HAND: ICD-10-CM

## 2024-05-29 RX ORDER — OXYCODONE HYDROCHLORIDE 5 MG/1
TABLET ORAL
Qty: 60 TABLET | Refills: 0 | Status: SHIPPED | OUTPATIENT
Start: 2024-05-30 | End: 2024-06-26

## 2024-05-29 RX ORDER — DEXTROAMPHETAMINE SACCHARATE, AMPHETAMINE ASPARTATE, DEXTROAMPHETAMINE SULFATE AND AMPHETAMINE SULFATE 5; 5; 5; 5 MG/1; MG/1; MG/1; MG/1
20 TABLET ORAL 3 TIMES DAILY
Qty: 90 TABLET | Refills: 0 | Status: SHIPPED | OUTPATIENT
Start: 2024-05-29 | End: 2024-06-25

## 2024-06-25 ENCOUNTER — MYC MEDICAL ADVICE (OUTPATIENT)
Dept: FAMILY MEDICINE | Facility: CLINIC | Age: 34
End: 2024-06-25
Payer: COMMERCIAL

## 2024-06-25 DIAGNOSIS — F90.8 ATTENTION DEFICIT HYPERACTIVITY DISORDER (ADHD), OTHER TYPE: ICD-10-CM

## 2024-06-25 RX ORDER — DEXTROAMPHETAMINE SACCHARATE, AMPHETAMINE ASPARTATE, DEXTROAMPHETAMINE SULFATE AND AMPHETAMINE SULFATE 5; 5; 5; 5 MG/1; MG/1; MG/1; MG/1
20 TABLET ORAL 3 TIMES DAILY
Qty: 90 TABLET | Refills: 0 | Status: SHIPPED | OUTPATIENT
Start: 2024-06-27 | End: 2024-06-26

## 2024-06-26 ENCOUNTER — MYC REFILL (OUTPATIENT)
Dept: FAMILY MEDICINE | Facility: CLINIC | Age: 34
End: 2024-06-26
Payer: COMMERCIAL

## 2024-06-26 DIAGNOSIS — M54.41 CHRONIC BILATERAL LOW BACK PAIN WITH RIGHT-SIDED SCIATICA: ICD-10-CM

## 2024-06-26 DIAGNOSIS — G89.29 CHRONIC BILATERAL LOW BACK PAIN WITH RIGHT-SIDED SCIATICA: ICD-10-CM

## 2024-06-26 DIAGNOSIS — M79.641 PAIN OF RIGHT HAND: ICD-10-CM

## 2024-06-26 RX ORDER — CYCLOBENZAPRINE HCL 10 MG
TABLET ORAL
Qty: 30 TABLET | Refills: 1 | Status: SHIPPED | OUTPATIENT
Start: 2024-06-26 | End: 2024-08-26

## 2024-06-26 RX ORDER — DEXTROAMPHETAMINE SACCHARATE, AMPHETAMINE ASPARTATE, DEXTROAMPHETAMINE SULFATE AND AMPHETAMINE SULFATE 5; 5; 5; 5 MG/1; MG/1; MG/1; MG/1
20 TABLET ORAL 3 TIMES DAILY
Qty: 90 TABLET | Refills: 0 | Status: SHIPPED | OUTPATIENT
Start: 2024-06-26 | End: 2024-07-23

## 2024-06-26 RX ORDER — OXYCODONE HYDROCHLORIDE 5 MG/1
5 TABLET ORAL 2 TIMES DAILY PRN
Qty: 60 TABLET | Refills: 0 | Status: SHIPPED | OUTPATIENT
Start: 2024-06-29 | End: 2024-07-29

## 2024-06-26 NOTE — TELEPHONE ENCOUNTER
Routing to provider - Please see mychart message from pt and advise.     Thank you - Kate Arvizu, GAELN, RN

## 2024-06-30 ENCOUNTER — HEALTH MAINTENANCE LETTER (OUTPATIENT)
Age: 34
End: 2024-06-30

## 2024-07-23 ENCOUNTER — MYC MEDICAL ADVICE (OUTPATIENT)
Dept: FAMILY MEDICINE | Facility: CLINIC | Age: 34
End: 2024-07-23
Payer: COMMERCIAL

## 2024-07-23 DIAGNOSIS — F90.8 ATTENTION DEFICIT HYPERACTIVITY DISORDER (ADHD), OTHER TYPE: ICD-10-CM

## 2024-07-23 RX ORDER — DEXTROAMPHETAMINE SACCHARATE, AMPHETAMINE ASPARTATE, DEXTROAMPHETAMINE SULFATE AND AMPHETAMINE SULFATE 5; 5; 5; 5 MG/1; MG/1; MG/1; MG/1
20 TABLET ORAL 3 TIMES DAILY
Qty: 90 TABLET | Refills: 0 | Status: SHIPPED | OUTPATIENT
Start: 2024-07-25 | End: 2024-08-23

## 2024-07-23 NOTE — TELEPHONE ENCOUNTER
Routing to provider - Please see [employer] message from pt and advise. Med/Pharm pended, if appropriate.     Thank you - Kate Arvizu, BSN, RN

## 2024-07-23 NOTE — TELEPHONE ENCOUNTER
Routing to provider - Pt sent additional message requesting prescription be filled today. If provider okay with that, please send new prescription to pharmacy. Please read message from pt and advise him.    Thank you - Kate Arvizu, GAELN, RN

## 2024-07-29 ENCOUNTER — MYC REFILL (OUTPATIENT)
Dept: FAMILY MEDICINE | Facility: CLINIC | Age: 34
End: 2024-07-29
Payer: COMMERCIAL

## 2024-07-29 DIAGNOSIS — M79.641 PAIN OF RIGHT HAND: ICD-10-CM

## 2024-07-29 DIAGNOSIS — F32.2 MAJOR DEPRESSIVE DISORDER, SINGLE EPISODE, SEVERE WITHOUT PSYCHOTIC FEATURES (H): ICD-10-CM

## 2024-07-29 RX ORDER — SERTRALINE HYDROCHLORIDE 100 MG/1
TABLET, FILM COATED ORAL
Qty: 90 TABLET | Refills: 1 | OUTPATIENT
Start: 2024-07-29

## 2024-07-29 RX ORDER — OXYCODONE HYDROCHLORIDE 5 MG/1
5 TABLET ORAL 2 TIMES DAILY PRN
Qty: 60 TABLET | Refills: 0 | Status: SHIPPED | OUTPATIENT
Start: 2024-07-29 | End: 2024-08-26

## 2024-08-23 ENCOUNTER — MYC REFILL (OUTPATIENT)
Dept: FAMILY MEDICINE | Facility: CLINIC | Age: 34
End: 2024-08-23
Payer: COMMERCIAL

## 2024-08-23 DIAGNOSIS — F90.8 ATTENTION DEFICIT HYPERACTIVITY DISORDER (ADHD), OTHER TYPE: ICD-10-CM

## 2024-08-23 RX ORDER — DEXTROAMPHETAMINE SACCHARATE, AMPHETAMINE ASPARTATE, DEXTROAMPHETAMINE SULFATE AND AMPHETAMINE SULFATE 5; 5; 5; 5 MG/1; MG/1; MG/1; MG/1
20 TABLET ORAL 3 TIMES DAILY
Qty: 90 TABLET | Refills: 0 | Status: SHIPPED | OUTPATIENT
Start: 2024-08-23 | End: 2024-09-19

## 2024-08-26 ENCOUNTER — MYC REFILL (OUTPATIENT)
Dept: FAMILY MEDICINE | Facility: CLINIC | Age: 34
End: 2024-08-26
Payer: COMMERCIAL

## 2024-08-26 DIAGNOSIS — G89.29 CHRONIC BILATERAL LOW BACK PAIN WITH RIGHT-SIDED SCIATICA: ICD-10-CM

## 2024-08-26 DIAGNOSIS — M54.41 CHRONIC BILATERAL LOW BACK PAIN WITH RIGHT-SIDED SCIATICA: ICD-10-CM

## 2024-08-26 DIAGNOSIS — M79.641 PAIN OF RIGHT HAND: ICD-10-CM

## 2024-08-26 RX ORDER — CYCLOBENZAPRINE HCL 10 MG
TABLET ORAL
Qty: 30 TABLET | Refills: 1 | Status: SHIPPED | OUTPATIENT
Start: 2024-08-26

## 2024-08-26 RX ORDER — OXYCODONE HYDROCHLORIDE 5 MG/1
5 TABLET ORAL 2 TIMES DAILY PRN
Qty: 60 TABLET | Refills: 0 | Status: SHIPPED | OUTPATIENT
Start: 2024-08-28 | End: 2024-09-26

## 2024-09-19 ENCOUNTER — MYC REFILL (OUTPATIENT)
Dept: FAMILY MEDICINE | Facility: CLINIC | Age: 34
End: 2024-09-19
Payer: COMMERCIAL

## 2024-09-19 DIAGNOSIS — F90.8 ATTENTION DEFICIT HYPERACTIVITY DISORDER (ADHD), OTHER TYPE: ICD-10-CM

## 2024-09-19 RX ORDER — DEXTROAMPHETAMINE SACCHARATE, AMPHETAMINE ASPARTATE, DEXTROAMPHETAMINE SULFATE AND AMPHETAMINE SULFATE 5; 5; 5; 5 MG/1; MG/1; MG/1; MG/1
20 TABLET ORAL 3 TIMES DAILY
Qty: 90 TABLET | Refills: 0 | Status: SHIPPED | OUTPATIENT
Start: 2024-09-22

## 2024-09-26 ENCOUNTER — MYC REFILL (OUTPATIENT)
Dept: FAMILY MEDICINE | Facility: CLINIC | Age: 34
End: 2024-09-26
Payer: COMMERCIAL

## 2024-09-26 DIAGNOSIS — M79.641 PAIN OF RIGHT HAND: ICD-10-CM

## 2024-09-26 RX ORDER — OXYCODONE HYDROCHLORIDE 5 MG/1
5 TABLET ORAL 2 TIMES DAILY PRN
Qty: 60 TABLET | Refills: 0 | Status: SHIPPED | OUTPATIENT
Start: 2024-09-26

## 2024-10-16 ENCOUNTER — MYC MEDICAL ADVICE (OUTPATIENT)
Dept: FAMILY MEDICINE | Facility: CLINIC | Age: 34
End: 2024-10-16
Payer: COMMERCIAL

## 2024-10-16 DIAGNOSIS — F90.8 OTHER SPECIFIED ATTENTION DEFICIT HYPERACTIVITY DISORDER (ADHD): Primary | ICD-10-CM

## 2024-10-16 DIAGNOSIS — M79.641 PAIN OF RIGHT HAND: ICD-10-CM

## 2024-10-16 DIAGNOSIS — M79.643 CHRONIC HAND PAIN, UNSPECIFIED LATERALITY: ICD-10-CM

## 2024-10-16 DIAGNOSIS — G89.29 CHRONIC HAND PAIN, UNSPECIFIED LATERALITY: ICD-10-CM

## 2024-10-16 RX ORDER — OXYCODONE HYDROCHLORIDE 5 MG/1
5 TABLET ORAL 2 TIMES DAILY PRN
Qty: 60 TABLET | Refills: 0 | Status: SHIPPED | OUTPATIENT
Start: 2024-10-25

## 2024-10-16 RX ORDER — DEXTROAMPHETAMINE SACCHARATE, AMPHETAMINE ASPARTATE, DEXTROAMPHETAMINE SULFATE AND AMPHETAMINE SULFATE 5; 5; 5; 5 MG/1; MG/1; MG/1; MG/1
20 TABLET ORAL 3 TIMES DAILY
Qty: 90 TABLET | Refills: 0 | Status: SHIPPED | OUTPATIENT
Start: 2024-10-20

## 2024-11-11 ENCOUNTER — MYC MEDICAL ADVICE (OUTPATIENT)
Dept: FAMILY MEDICINE | Facility: CLINIC | Age: 34
End: 2024-11-11
Payer: COMMERCIAL

## 2024-11-11 DIAGNOSIS — G89.29 CHRONIC BILATERAL LOW BACK PAIN WITH RIGHT-SIDED SCIATICA: ICD-10-CM

## 2024-11-11 DIAGNOSIS — M54.41 CHRONIC BILATERAL LOW BACK PAIN WITH RIGHT-SIDED SCIATICA: ICD-10-CM

## 2024-11-12 RX ORDER — CYCLOBENZAPRINE HCL 10 MG
TABLET ORAL
Qty: 30 TABLET | Refills: 1 | Status: SHIPPED | OUTPATIENT
Start: 2024-11-12

## 2024-11-14 ENCOUNTER — MYC REFILL (OUTPATIENT)
Dept: FAMILY MEDICINE | Facility: CLINIC | Age: 34
End: 2024-11-14
Payer: COMMERCIAL

## 2024-11-14 DIAGNOSIS — F90.8 OTHER SPECIFIED ATTENTION DEFICIT HYPERACTIVITY DISORDER (ADHD): ICD-10-CM

## 2024-11-14 RX ORDER — DEXTROAMPHETAMINE SACCHARATE, AMPHETAMINE ASPARTATE, DEXTROAMPHETAMINE SULFATE AND AMPHETAMINE SULFATE 5; 5; 5; 5 MG/1; MG/1; MG/1; MG/1
20 TABLET ORAL 3 TIMES DAILY
Qty: 90 TABLET | Refills: 0 | Status: SHIPPED | OUTPATIENT
Start: 2024-11-19

## 2024-12-16 ENCOUNTER — MYC REFILL (OUTPATIENT)
Dept: FAMILY MEDICINE | Facility: CLINIC | Age: 34
End: 2024-12-16

## 2024-12-16 DIAGNOSIS — F90.8 OTHER SPECIFIED ATTENTION DEFICIT HYPERACTIVITY DISORDER (ADHD): ICD-10-CM

## 2024-12-16 DIAGNOSIS — M79.643 CHRONIC HAND PAIN, UNSPECIFIED LATERALITY: ICD-10-CM

## 2024-12-16 DIAGNOSIS — G89.29 CHRONIC HAND PAIN, UNSPECIFIED LATERALITY: ICD-10-CM

## 2024-12-16 RX ORDER — OXYCODONE HYDROCHLORIDE 5 MG/1
5 TABLET ORAL 2 TIMES DAILY PRN
Qty: 60 TABLET | Refills: 0 | Status: SHIPPED | OUTPATIENT
Start: 2024-12-19

## 2024-12-16 RX ORDER — DEXTROAMPHETAMINE SACCHARATE, AMPHETAMINE ASPARTATE, DEXTROAMPHETAMINE SULFATE AND AMPHETAMINE SULFATE 5; 5; 5; 5 MG/1; MG/1; MG/1; MG/1
20 TABLET ORAL 3 TIMES DAILY
Qty: 90 TABLET | Refills: 0 | Status: SHIPPED | OUTPATIENT
Start: 2024-12-16

## 2025-01-14 ENCOUNTER — MYC REFILL (OUTPATIENT)
Dept: FAMILY MEDICINE | Facility: CLINIC | Age: 35
End: 2025-01-14

## 2025-01-14 DIAGNOSIS — F90.8 OTHER SPECIFIED ATTENTION DEFICIT HYPERACTIVITY DISORDER (ADHD): ICD-10-CM

## 2025-01-15 ENCOUNTER — MYC REFILL (OUTPATIENT)
Dept: FAMILY MEDICINE | Facility: CLINIC | Age: 35
End: 2025-01-15

## 2025-01-15 DIAGNOSIS — G89.29 CHRONIC HAND PAIN, UNSPECIFIED LATERALITY: ICD-10-CM

## 2025-01-15 DIAGNOSIS — M79.643 CHRONIC HAND PAIN, UNSPECIFIED LATERALITY: ICD-10-CM

## 2025-01-15 RX ORDER — DEXTROAMPHETAMINE SACCHARATE, AMPHETAMINE ASPARTATE, DEXTROAMPHETAMINE SULFATE AND AMPHETAMINE SULFATE 5; 5; 5; 5 MG/1; MG/1; MG/1; MG/1
20 TABLET ORAL 3 TIMES DAILY
Qty: 90 TABLET | Refills: 0 | Status: SHIPPED | OUTPATIENT
Start: 2025-01-15

## 2025-01-15 RX ORDER — OXYCODONE HYDROCHLORIDE 5 MG/1
5 TABLET ORAL 2 TIMES DAILY PRN
Qty: 60 TABLET | Refills: 0 | Status: SHIPPED | OUTPATIENT
Start: 2025-01-15

## 2025-02-11 ENCOUNTER — MYC MEDICAL ADVICE (OUTPATIENT)
Dept: FAMILY MEDICINE | Facility: CLINIC | Age: 35
End: 2025-02-11

## 2025-02-11 DIAGNOSIS — F90.8 OTHER SPECIFIED ATTENTION DEFICIT HYPERACTIVITY DISORDER (ADHD): ICD-10-CM

## 2025-02-11 RX ORDER — DEXTROAMPHETAMINE SACCHARATE, AMPHETAMINE ASPARTATE, DEXTROAMPHETAMINE SULFATE AND AMPHETAMINE SULFATE 5; 5; 5; 5 MG/1; MG/1; MG/1; MG/1
20 TABLET ORAL 3 TIMES DAILY
Qty: 90 TABLET | Refills: 0 | Status: SHIPPED | OUTPATIENT
Start: 2025-02-12

## 2025-02-12 ENCOUNTER — MYC MEDICAL ADVICE (OUTPATIENT)
Dept: FAMILY MEDICINE | Facility: CLINIC | Age: 35
End: 2025-02-12

## 2025-02-12 ENCOUNTER — MYC REFILL (OUTPATIENT)
Dept: FAMILY MEDICINE | Facility: CLINIC | Age: 35
End: 2025-02-12

## 2025-02-12 DIAGNOSIS — M79.643 CHRONIC HAND PAIN, UNSPECIFIED LATERALITY: ICD-10-CM

## 2025-02-12 DIAGNOSIS — G89.29 CHRONIC HAND PAIN, UNSPECIFIED LATERALITY: ICD-10-CM

## 2025-02-12 RX ORDER — OXYCODONE HYDROCHLORIDE 10 MG/1
5 TABLET ORAL 2 TIMES DAILY PRN
Qty: 30 TABLET | Refills: 0 | Status: SHIPPED | OUTPATIENT
Start: 2025-02-14

## 2025-02-12 RX ORDER — OXYCODONE HYDROCHLORIDE 5 MG/1
5 TABLET ORAL 2 TIMES DAILY PRN
Qty: 60 TABLET | Refills: 0 | Status: SHIPPED | OUTPATIENT
Start: 2025-02-14

## 2025-02-20 ENCOUNTER — PATIENT OUTREACH (OUTPATIENT)
Dept: FAMILY MEDICINE | Facility: CLINIC | Age: 35
End: 2025-02-20

## 2025-02-20 NOTE — TELEPHONE ENCOUNTER
Patient Quality Outreach    Patient is due for the following:   Depression  -  PHQ-9 needed  Physical Preventive Adult Physical      Topic Date Due    Hepatitis B Vaccine (1 of 3 - 19+ 3-dose series) Never done    Diptheria Tetanus Pertussis (DTAP/TDAP/TD) Vaccine (3 - Td or Tdap) 10/16/2022    Flu Vaccine (1) 09/01/2024    COVID-19 Vaccine (1 - 2024-25 season) Never done       Action(s) Taken:   Schedule a Adult Preventative    Type of outreach:    Sent [employer] message.    Questions for provider review:    None           Sally Fernandez, CMA

## 2025-03-10 ENCOUNTER — MYC REFILL (OUTPATIENT)
Dept: FAMILY MEDICINE | Facility: CLINIC | Age: 35
End: 2025-03-10

## 2025-03-10 DIAGNOSIS — F90.8 OTHER SPECIFIED ATTENTION DEFICIT HYPERACTIVITY DISORDER (ADHD): ICD-10-CM

## 2025-03-11 RX ORDER — DEXTROAMPHETAMINE SACCHARATE, AMPHETAMINE ASPARTATE, DEXTROAMPHETAMINE SULFATE AND AMPHETAMINE SULFATE 5; 5; 5; 5 MG/1; MG/1; MG/1; MG/1
20 TABLET ORAL 3 TIMES DAILY
Qty: 90 TABLET | Refills: 0 | Status: SHIPPED | OUTPATIENT
Start: 2025-03-11

## 2025-03-13 ENCOUNTER — MYC REFILL (OUTPATIENT)
Dept: FAMILY MEDICINE | Facility: CLINIC | Age: 35
End: 2025-03-13

## 2025-03-13 DIAGNOSIS — G89.29 CHRONIC HAND PAIN, UNSPECIFIED LATERALITY: ICD-10-CM

## 2025-03-13 DIAGNOSIS — M79.643 CHRONIC HAND PAIN, UNSPECIFIED LATERALITY: ICD-10-CM

## 2025-03-13 RX ORDER — OXYCODONE HYDROCHLORIDE 10 MG/1
5 TABLET ORAL 2 TIMES DAILY PRN
Qty: 30 TABLET | Refills: 0 | Status: SHIPPED | OUTPATIENT
Start: 2025-03-13

## 2025-04-08 ENCOUNTER — MYC REFILL (OUTPATIENT)
Dept: FAMILY MEDICINE | Facility: CLINIC | Age: 35
End: 2025-04-08

## 2025-04-08 DIAGNOSIS — M79.643 CHRONIC HAND PAIN, UNSPECIFIED LATERALITY: ICD-10-CM

## 2025-04-08 DIAGNOSIS — G89.29 CHRONIC HAND PAIN, UNSPECIFIED LATERALITY: ICD-10-CM

## 2025-04-08 DIAGNOSIS — F90.8 OTHER SPECIFIED ATTENTION DEFICIT HYPERACTIVITY DISORDER (ADHD): ICD-10-CM

## 2025-04-08 RX ORDER — OXYCODONE HYDROCHLORIDE 10 MG/1
5 TABLET ORAL 2 TIMES DAILY PRN
Qty: 30 TABLET | Refills: 0 | Status: SHIPPED | OUTPATIENT
Start: 2025-04-12

## 2025-04-08 RX ORDER — DEXTROAMPHETAMINE SACCHARATE, AMPHETAMINE ASPARTATE, DEXTROAMPHETAMINE SULFATE AND AMPHETAMINE SULFATE 5; 5; 5; 5 MG/1; MG/1; MG/1; MG/1
20 TABLET ORAL 3 TIMES DAILY
Qty: 90 TABLET | Refills: 0 | Status: SHIPPED | OUTPATIENT
Start: 2025-04-10

## 2025-05-06 ENCOUNTER — MYC REFILL (OUTPATIENT)
Dept: FAMILY MEDICINE | Facility: CLINIC | Age: 35
End: 2025-05-06

## 2025-05-06 DIAGNOSIS — F90.8 OTHER SPECIFIED ATTENTION DEFICIT HYPERACTIVITY DISORDER (ADHD): ICD-10-CM

## 2025-05-06 RX ORDER — DEXTROAMPHETAMINE SACCHARATE, AMPHETAMINE ASPARTATE, DEXTROAMPHETAMINE SULFATE AND AMPHETAMINE SULFATE 5; 5; 5; 5 MG/1; MG/1; MG/1; MG/1
20 TABLET ORAL 3 TIMES DAILY
Qty: 90 TABLET | Refills: 0 | Status: SHIPPED | OUTPATIENT
Start: 2025-05-06

## 2025-05-07 ENCOUNTER — MYC REFILL (OUTPATIENT)
Dept: FAMILY MEDICINE | Facility: CLINIC | Age: 35
End: 2025-05-07

## 2025-05-07 DIAGNOSIS — M79.643 CHRONIC HAND PAIN, UNSPECIFIED LATERALITY: ICD-10-CM

## 2025-05-07 DIAGNOSIS — G89.29 CHRONIC HAND PAIN, UNSPECIFIED LATERALITY: ICD-10-CM

## 2025-05-08 RX ORDER — OXYCODONE HYDROCHLORIDE 5 MG/1
5 TABLET ORAL 2 TIMES DAILY PRN
Qty: 60 TABLET | Refills: 0 | Status: SHIPPED | OUTPATIENT
Start: 2025-05-10

## 2025-05-08 RX ORDER — OXYCODONE HYDROCHLORIDE 10 MG/1
5 TABLET ORAL 2 TIMES DAILY PRN
Qty: 30 TABLET | Refills: 0 | OUTPATIENT
Start: 2025-05-08

## 2025-06-03 ENCOUNTER — MYC MEDICAL ADVICE (OUTPATIENT)
Dept: FAMILY MEDICINE | Facility: CLINIC | Age: 35
End: 2025-06-03
Payer: MEDICAID

## 2025-06-04 NOTE — TELEPHONE ENCOUNTER
This has been addressed.  Waiting to hear from patient with MA information (member ID, etc.)  Alma Rosa GRIGGS    [facility]

## 2025-06-06 ENCOUNTER — MYC REFILL (OUTPATIENT)
Dept: FAMILY MEDICINE | Facility: CLINIC | Age: 35
End: 2025-06-06
Payer: MEDICAID

## 2025-06-06 DIAGNOSIS — F90.8 OTHER SPECIFIED ATTENTION DEFICIT HYPERACTIVITY DISORDER (ADHD): ICD-10-CM

## 2025-06-08 RX ORDER — DEXTROAMPHETAMINE SACCHARATE, AMPHETAMINE ASPARTATE, DEXTROAMPHETAMINE SULFATE AND AMPHETAMINE SULFATE 5; 5; 5; 5 MG/1; MG/1; MG/1; MG/1
20 TABLET ORAL 3 TIMES DAILY
Qty: 90 TABLET | Refills: 0 | Status: SHIPPED | OUTPATIENT
Start: 2025-06-08

## 2025-06-09 ENCOUNTER — MYC REFILL (OUTPATIENT)
Dept: FAMILY MEDICINE | Facility: CLINIC | Age: 35
End: 2025-06-09
Payer: MEDICAID

## 2025-06-09 DIAGNOSIS — M79.643 CHRONIC HAND PAIN, UNSPECIFIED LATERALITY: ICD-10-CM

## 2025-06-09 DIAGNOSIS — G89.29 CHRONIC HAND PAIN, UNSPECIFIED LATERALITY: ICD-10-CM

## 2025-06-09 RX ORDER — OXYCODONE HYDROCHLORIDE 5 MG/1
5 TABLET ORAL 2 TIMES DAILY PRN
Qty: 60 TABLET | Refills: 0 | Status: SHIPPED | OUTPATIENT
Start: 2025-06-09

## 2025-06-09 NOTE — PROGRESS NOTES
"SUBJECTIVE:  Tejas Villalba III, a 27 year old male scheduled an appointment to discuss the following issues:  Follow-up adhd, anxiety, migraines and chronic pain issues  Overall doing ok with back. Working more. Boy doing ok turned 2yo. On/off relationship with mother of kid.   Family ok. No chest pain or shortness of breath. No SUICIAL IDEATION OR HOMOCIDAL IDEATION OR BERNADETTE.  Gabapentin - at bedtime helpful with sleep.   Zoloft.     Past Medical History:   Diagnosis Date     ADHD      HTN        Past Surgical History:   Procedure Laterality Date     PE TUBES         Family History   Problem Relation Age of Onset     DIABETES Father        Social History   Substance Use Topics     Smoking status: Former Smoker     Packs/day: 0.50     Quit date: 8/18/2010     Smokeless tobacco: Never Used     Alcohol use Yes       ROS:  All other ROS negative.     OBJECTIVE:  /73  Pulse 69  Temp 97  F (36.1  C) (Oral)  Resp 20  Ht 6' 1\" (1.854 m)  Wt 200 lb (90.7 kg)  SpO2 100%  BMI 26.39 kg/m2  EXAM:  GENERAL APPEARANCE: healthy, alert and no distress  NECK: no adenopathy, no asymmetry, masses, or scars and thyroid normal to palpation  RESP: lungs clear to auscultation - no rales, rhonchi or wheezes  CV: regular rates and rhythm, normal S1 S2, no S3 or S4 and no murmur, click or rub -  ABDOMEN:  soft, nontender, no HSM or masses and bowel sounds normal  MS: extremities normal- no gross deformities noted, no evidence of inflammation in joints, FROM in all extremities.  PSYCH: mentation appears normal and affect normal/bright    ASSESSMENT / PLAN:  (F41.1) Generalized anxiety disorder  (primary encounter diagnosis)  Comment: stable  Plan: continue zoloft. If SUICIAL IDEATION OR HOMOCIDAL IDEATION OR BERNADETTE TO ER. Exercise and limit caffeine. Recheck in 6 months  Sooner if worse    (F90.8) Attention deficit hyperactivity disorder (ADHD), other type  Comment: stable  Plan: amphetamine-dextroamphetamine (ADDERALL) 30 " Last follow up visit date: 3/26/25    Last urine drug screen date: none on file    Last consent/contract date: none on file    Does patient utilize [facility] pharmacy (yes or no)? no   MG         per tablet, DISCONTINUED:         amphetamine-dextroamphetamine (ADDERALL) 30 MG         per tablet, DISCONTINUED:         amphetamine-dextroamphetamine (ADDERALL) 30 MG         per tablet        Prn work. Reveiwed risks and side effects of medication  Recheck in 6 months  Sooner fi worse    (G44.219) Episodic tension-type headache, not intractable  Comment: stable  Plan: butalbital-acetaminophen-caffeine         (FIORICET/ESGIC) -40 MG per tablet        Prn. exercise    (F33.0) Major depressive disorder, recurrent episode, mild (H)  Comment: stable  Plan: as above.     (M54.41,  G89.29) Chronic bilateral low back pain with right-sided sciatica  Comment: so far doing ok off narcotics  Plan: PAIN MANAGEMENT REFERRAL        Advised will need follow-up with pain specialist if worse. Ibuprofen/heat/tylenol/stretching. Expected course and warning signs reviewed.     Karan Waters

## 2025-06-12 ENCOUNTER — MYC MEDICAL ADVICE (OUTPATIENT)
Dept: FAMILY MEDICINE | Facility: CLINIC | Age: 35
End: 2025-06-12
Payer: MEDICAID

## 2025-07-13 ENCOUNTER — HEALTH MAINTENANCE LETTER (OUTPATIENT)
Age: 35
End: 2025-07-13

## 2025-08-04 ENCOUNTER — MYC MEDICAL ADVICE (OUTPATIENT)
Dept: FAMILY MEDICINE | Facility: CLINIC | Age: 35
End: 2025-08-04
Payer: MEDICAID

## 2025-08-04 ENCOUNTER — MYC REFILL (OUTPATIENT)
Dept: FAMILY MEDICINE | Facility: CLINIC | Age: 35
End: 2025-08-04
Payer: MEDICAID

## 2025-08-04 DIAGNOSIS — G89.29 CHRONIC HAND PAIN, UNSPECIFIED LATERALITY: ICD-10-CM

## 2025-08-04 DIAGNOSIS — M79.643 CHRONIC HAND PAIN, UNSPECIFIED LATERALITY: ICD-10-CM

## 2025-08-04 DIAGNOSIS — F90.8 OTHER SPECIFIED ATTENTION DEFICIT HYPERACTIVITY DISORDER (ADHD): ICD-10-CM

## 2025-08-04 RX ORDER — OXYCODONE HYDROCHLORIDE 5 MG/1
5 TABLET ORAL 2 TIMES DAILY PRN
Qty: 60 TABLET | Refills: 0 | Status: SHIPPED | OUTPATIENT
Start: 2025-08-05

## 2025-08-04 RX ORDER — DEXTROAMPHETAMINE SACCHARATE, AMPHETAMINE ASPARTATE, DEXTROAMPHETAMINE SULFATE AND AMPHETAMINE SULFATE 5; 5; 5; 5 MG/1; MG/1; MG/1; MG/1
20 TABLET ORAL 3 TIMES DAILY
Qty: 90 TABLET | Refills: 0 | Status: SHIPPED | OUTPATIENT
Start: 2025-08-05

## 2025-08-04 RX ORDER — OXYCODONE HYDROCHLORIDE 5 MG/1
5 TABLET ORAL 2 TIMES DAILY PRN
Qty: 60 TABLET | Refills: 0 | Status: SHIPPED | OUTPATIENT
Start: 2025-08-06 | End: 2025-08-04

## 2025-08-04 RX ORDER — DEXTROAMPHETAMINE SACCHARATE, AMPHETAMINE ASPARTATE, DEXTROAMPHETAMINE SULFATE AND AMPHETAMINE SULFATE 5; 5; 5; 5 MG/1; MG/1; MG/1; MG/1
20 TABLET ORAL 3 TIMES DAILY
Qty: 90 TABLET | Refills: 0 | Status: SHIPPED | OUTPATIENT
Start: 2025-08-06 | End: 2025-08-04

## 2025-08-05 ENCOUNTER — MYC MEDICAL ADVICE (OUTPATIENT)
Dept: FAMILY MEDICINE | Facility: CLINIC | Age: 35
End: 2025-08-05
Payer: MEDICAID

## 2025-08-05 DIAGNOSIS — F90.8 OTHER SPECIFIED ATTENTION DEFICIT HYPERACTIVITY DISORDER (ADHD): Primary | ICD-10-CM

## 2025-08-05 RX ORDER — DEXTROAMPHETAMINE SACCHARATE, AMPHETAMINE ASPARTATE, DEXTROAMPHETAMINE SULFATE AND AMPHETAMINE SULFATE 3.75; 3.75; 3.75; 3.75 MG/1; MG/1; MG/1; MG/1
30 TABLET ORAL 2 TIMES DAILY
Qty: 120 TABLET | Refills: 0 | Status: SHIPPED | OUTPATIENT
Start: 2025-08-05

## 2025-09-03 ENCOUNTER — MYC REFILL (OUTPATIENT)
Dept: FAMILY MEDICINE | Facility: CLINIC | Age: 35
End: 2025-09-03
Payer: MEDICAID

## 2025-09-03 DIAGNOSIS — F90.8 OTHER SPECIFIED ATTENTION DEFICIT HYPERACTIVITY DISORDER (ADHD): ICD-10-CM

## 2025-09-04 ENCOUNTER — MYC MEDICAL ADVICE (OUTPATIENT)
Dept: FAMILY MEDICINE | Facility: CLINIC | Age: 35
End: 2025-09-04
Payer: MEDICAID

## 2025-09-04 DIAGNOSIS — F90.8 OTHER SPECIFIED ATTENTION DEFICIT HYPERACTIVITY DISORDER (ADHD): ICD-10-CM

## 2025-09-04 RX ORDER — DEXTROAMPHETAMINE SACCHARATE, AMPHETAMINE ASPARTATE, DEXTROAMPHETAMINE SULFATE AND AMPHETAMINE SULFATE 3.75; 3.75; 3.75; 3.75 MG/1; MG/1; MG/1; MG/1
30 TABLET ORAL 2 TIMES DAILY
Qty: 120 TABLET | Refills: 0 | Status: SHIPPED | OUTPATIENT
Start: 2025-09-04 | End: 2025-09-04

## 2025-09-04 RX ORDER — DEXTROAMPHETAMINE SACCHARATE, AMPHETAMINE ASPARTATE, DEXTROAMPHETAMINE SULFATE AND AMPHETAMINE SULFATE 3.75; 3.75; 3.75; 3.75 MG/1; MG/1; MG/1; MG/1
30 TABLET ORAL 2 TIMES DAILY
Qty: 120 TABLET | Refills: 0 | Status: SHIPPED | OUTPATIENT
Start: 2025-09-04

## (undated) DEVICE — SOL WATER IRRIG 1000ML BOTTLE 07139-09

## (undated) DEVICE — SU MONOCRYL 4-0 PS-2 18" UND Y496G

## (undated) DEVICE — ADH SKIN CLOSURE PREMIERPRO EXOFIN MICOR HV 0.5ML 3471

## (undated) DEVICE — SU VICRYL 3-0 SH 27" UND J416H

## (undated) DEVICE — GLOVE PROTEXIS BLUE W/NEU-THERA 7.5  2D73EB75

## (undated) DEVICE — GLOVE PROTEXIS W/NEU-THERA 7.5  2D73TE75

## (undated) DEVICE — SYR EAR BULB 3OZ 0035830

## (undated) DEVICE — PACK MINOR SBA15MIFSE

## (undated) DEVICE — PREP CHLORAPREP 26ML TINTED ORANGE  260815

## (undated) DEVICE — DRAPE LAP W/ARMBOARD 29410

## (undated) RX ORDER — BUPIVACAINE HYDROCHLORIDE AND EPINEPHRINE 5; 5 MG/ML; UG/ML
INJECTION, SOLUTION EPIDURAL; INTRACAUDAL; PERINEURAL
Status: DISPENSED
Start: 2021-05-05